# Patient Record
Sex: MALE | Race: OTHER | HISPANIC OR LATINO
[De-identification: names, ages, dates, MRNs, and addresses within clinical notes are randomized per-mention and may not be internally consistent; named-entity substitution may affect disease eponyms.]

---

## 2018-02-27 ENCOUNTER — APPOINTMENT (OUTPATIENT)
Dept: GASTROENTEROLOGY | Facility: CLINIC | Age: 58
End: 2018-02-27
Payer: COMMERCIAL

## 2018-02-27 VITALS
HEART RATE: 78 BPM | WEIGHT: 266 LBS | RESPIRATION RATE: 17 BRPM | OXYGEN SATURATION: 98 % | DIASTOLIC BLOOD PRESSURE: 70 MMHG | BODY MASS INDEX: 38.08 KG/M2 | SYSTOLIC BLOOD PRESSURE: 118 MMHG | HEIGHT: 70 IN | TEMPERATURE: 97.8 F

## 2018-02-27 DIAGNOSIS — Z78.9 OTHER SPECIFIED HEALTH STATUS: ICD-10-CM

## 2018-02-27 DIAGNOSIS — Z12.11 ENCOUNTER FOR SCREENING FOR MALIGNANT NEOPLASM OF COLON: ICD-10-CM

## 2018-02-27 DIAGNOSIS — M19.90 UNSPECIFIED OSTEOARTHRITIS, UNSPECIFIED SITE: ICD-10-CM

## 2018-02-27 PROCEDURE — 99204 OFFICE O/P NEW MOD 45 MIN: CPT

## 2018-02-28 PROBLEM — M19.90 OSTEOARTHRITIS, CHRONIC: Status: RESOLVED | Noted: 2018-02-28 | Resolved: 2018-02-28

## 2018-02-28 PROBLEM — Z12.11 COLON CANCER SCREENING: Status: RESOLVED | Noted: 2018-02-27 | Resolved: 2018-02-28

## 2018-04-02 DIAGNOSIS — Z87.01 PERSONAL HISTORY OF PNEUMONIA (RECURRENT): ICD-10-CM

## 2018-04-02 DIAGNOSIS — Z87.898 PERSONAL HISTORY OF OTHER SPECIFIED CONDITIONS: ICD-10-CM

## 2018-04-02 DIAGNOSIS — Z82.3 FAMILY HISTORY OF STROKE: ICD-10-CM

## 2018-04-02 DIAGNOSIS — Z87.442 PERSONAL HISTORY OF URINARY CALCULI: ICD-10-CM

## 2018-04-09 ENCOUNTER — APPOINTMENT (OUTPATIENT)
Dept: GASTROENTEROLOGY | Facility: CLINIC | Age: 58
End: 2018-04-09
Payer: COMMERCIAL

## 2018-04-09 VITALS
TEMPERATURE: 98.1 F | RESPIRATION RATE: 16 BRPM | BODY MASS INDEX: 39.22 KG/M2 | SYSTOLIC BLOOD PRESSURE: 118 MMHG | HEART RATE: 88 BPM | OXYGEN SATURATION: 98 % | HEIGHT: 70 IN | WEIGHT: 274 LBS | DIASTOLIC BLOOD PRESSURE: 86 MMHG

## 2018-04-09 PROCEDURE — 99214 OFFICE O/P EST MOD 30 MIN: CPT

## 2018-04-09 RX ORDER — PANTOPRAZOLE 40 MG/1
TABLET, DELAYED RELEASE ORAL
Refills: 0 | Status: COMPLETED | COMMUNITY
End: 2018-02-28

## 2018-04-16 ENCOUNTER — RX RENEWAL (OUTPATIENT)
Age: 58
End: 2018-04-16

## 2018-04-16 RX ORDER — RABEPRAZOLE SODIUM 20 MG/1
20 TABLET, DELAYED RELEASE ORAL DAILY
Qty: 30 | Refills: 5 | Status: DISCONTINUED | COMMUNITY
Start: 2018-04-16 | End: 2018-04-16

## 2018-04-16 RX ORDER — OMEPRAZOLE 20 MG/1
20 CAPSULE, DELAYED RELEASE ORAL DAILY
Qty: 30 | Refills: 3 | Status: DISCONTINUED | COMMUNITY
Start: 2018-04-16 | End: 2018-04-16

## 2018-05-04 ENCOUNTER — INPATIENT (INPATIENT)
Facility: HOSPITAL | Age: 58
LOS: 3 days | Discharge: HOME | DRG: 392 | End: 2018-05-08
Admitting: INTERNAL MEDICINE
Payer: COMMERCIAL

## 2018-05-04 ENCOUNTER — RESULT REVIEW (OUTPATIENT)
Age: 58
End: 2018-05-04

## 2018-05-04 ENCOUNTER — APPOINTMENT (OUTPATIENT)
Dept: GASTROENTEROLOGY | Facility: HOSPITAL | Age: 58
End: 2018-05-04
Payer: COMMERCIAL

## 2018-05-04 VITALS
HEART RATE: 85 BPM | TEMPERATURE: 99 F | WEIGHT: 235.89 LBS | HEIGHT: 70 IN | DIASTOLIC BLOOD PRESSURE: 87 MMHG | SYSTOLIC BLOOD PRESSURE: 133 MMHG | RESPIRATION RATE: 18 BRPM | OXYGEN SATURATION: 95 %

## 2018-05-04 DIAGNOSIS — K21.0 GASTRO-ESOPHAGEAL REFLUX DISEASE WITH ESOPHAGITIS: ICD-10-CM

## 2018-05-04 DIAGNOSIS — Z29.9 ENCOUNTER FOR PROPHYLACTIC MEASURES, UNSPECIFIED: ICD-10-CM

## 2018-05-04 DIAGNOSIS — Z98.890 OTHER SPECIFIED POSTPROCEDURAL STATES: Chronic | ICD-10-CM

## 2018-05-04 DIAGNOSIS — M54.9 DORSALGIA, UNSPECIFIED: ICD-10-CM

## 2018-05-04 DIAGNOSIS — E66.9 OBESITY, UNSPECIFIED: ICD-10-CM

## 2018-05-04 DIAGNOSIS — M19.90 UNSPECIFIED OSTEOARTHRITIS, UNSPECIFIED SITE: ICD-10-CM

## 2018-05-04 DIAGNOSIS — G47.33 OBSTRUCTIVE SLEEP APNEA (ADULT) (PEDIATRIC): ICD-10-CM

## 2018-05-04 DIAGNOSIS — K21.9 GASTRO-ESOPHAGEAL REFLUX DISEASE WITHOUT ESOPHAGITIS: ICD-10-CM

## 2018-05-04 LAB
ALBUMIN SERPL ELPH-MCNC: 4.4 G/DL — SIGNIFICANT CHANGE UP (ref 3.3–5)
ALP SERPL-CCNC: 46 U/L — SIGNIFICANT CHANGE UP (ref 40–120)
ALT FLD-CCNC: 29 U/L — SIGNIFICANT CHANGE UP (ref 10–45)
ANION GAP SERPL CALC-SCNC: 14 MMOL/L — SIGNIFICANT CHANGE UP (ref 5–17)
APTT BLD: 28.7 SEC — SIGNIFICANT CHANGE UP (ref 27.5–37.4)
AST SERPL-CCNC: 19 U/L — SIGNIFICANT CHANGE UP (ref 10–40)
BILIRUB SERPL-MCNC: 1.1 MG/DL — SIGNIFICANT CHANGE UP (ref 0.2–1.2)
BLD GP AB SCN SERPL QL: NEGATIVE — SIGNIFICANT CHANGE UP
BUN SERPL-MCNC: 16 MG/DL — SIGNIFICANT CHANGE UP (ref 7–23)
CALCIUM SERPL-MCNC: 9.3 MG/DL — SIGNIFICANT CHANGE UP (ref 8.4–10.5)
CHLORIDE SERPL-SCNC: 104 MMOL/L — SIGNIFICANT CHANGE UP (ref 96–108)
CO2 SERPL-SCNC: 21 MMOL/L — LOW (ref 22–31)
CREAT SERPL-MCNC: 1.06 MG/DL — SIGNIFICANT CHANGE UP (ref 0.5–1.3)
GLUCOSE SERPL-MCNC: 95 MG/DL — SIGNIFICANT CHANGE UP (ref 70–99)
HCT VFR BLD CALC: 46.4 % — SIGNIFICANT CHANGE UP (ref 39–50)
HGB BLD-MCNC: 16.1 G/DL — SIGNIFICANT CHANGE UP (ref 13–17)
INR BLD: 1.18 RATIO — HIGH (ref 0.88–1.16)
MAGNESIUM SERPL-MCNC: 2.1 MG/DL — SIGNIFICANT CHANGE UP (ref 1.6–2.6)
MCHC RBC-ENTMCNC: 32.6 PG — SIGNIFICANT CHANGE UP (ref 27–34)
MCHC RBC-ENTMCNC: 34.7 GM/DL — SIGNIFICANT CHANGE UP (ref 32–36)
MCV RBC AUTO: 93.8 FL — SIGNIFICANT CHANGE UP (ref 80–100)
PHOSPHATE SERPL-MCNC: 2.8 MG/DL — SIGNIFICANT CHANGE UP (ref 2.5–4.5)
PLATELET # BLD AUTO: 208 K/UL — SIGNIFICANT CHANGE UP (ref 150–400)
POTASSIUM SERPL-MCNC: 3.8 MMOL/L — SIGNIFICANT CHANGE UP (ref 3.5–5.3)
POTASSIUM SERPL-SCNC: 3.8 MMOL/L — SIGNIFICANT CHANGE UP (ref 3.5–5.3)
PROT SERPL-MCNC: 7.5 G/DL — SIGNIFICANT CHANGE UP (ref 6–8.3)
PROTHROM AB SERPL-ACNC: 12.9 SEC — HIGH (ref 9.8–12.7)
RBC # BLD: 4.95 M/UL — SIGNIFICANT CHANGE UP (ref 4.2–5.8)
RBC # FLD: 12 % — SIGNIFICANT CHANGE UP (ref 10.3–14.5)
RH IG SCN BLD-IMP: POSITIVE — SIGNIFICANT CHANGE UP
SODIUM SERPL-SCNC: 139 MMOL/L — SIGNIFICANT CHANGE UP (ref 135–145)
WBC # BLD: 9 K/UL — SIGNIFICANT CHANGE UP (ref 3.8–10.5)
WBC # FLD AUTO: 9 K/UL — SIGNIFICANT CHANGE UP (ref 3.8–10.5)

## 2018-05-04 PROCEDURE — 88312 SPECIAL STAINS GROUP 1: CPT | Mod: 26

## 2018-05-04 PROCEDURE — 99222 1ST HOSP IP/OBS MODERATE 55: CPT

## 2018-05-04 PROCEDURE — 88305 TISSUE EXAM BY PATHOLOGIST: CPT | Mod: 26

## 2018-05-04 RX ORDER — OXYCODONE HYDROCHLORIDE 5 MG/1
5 TABLET ORAL EVERY 4 HOURS
Qty: 0 | Refills: 0 | Status: DISCONTINUED | OUTPATIENT
Start: 2018-05-04 | End: 2018-05-04

## 2018-05-04 RX ORDER — OXYCODONE AND ACETAMINOPHEN 5; 325 MG/1; MG/1
1 TABLET ORAL EVERY 4 HOURS
Qty: 0 | Refills: 0 | Status: DISCONTINUED | OUTPATIENT
Start: 2018-05-04 | End: 2018-05-06

## 2018-05-04 RX ORDER — MORPHINE SULFATE 50 MG/1
2 CAPSULE, EXTENDED RELEASE ORAL ONCE
Qty: 0 | Refills: 0 | Status: DISCONTINUED | OUTPATIENT
Start: 2018-05-04 | End: 2018-05-04

## 2018-05-04 RX ORDER — HEPARIN SODIUM 5000 [USP'U]/ML
5000 INJECTION INTRAVENOUS; SUBCUTANEOUS EVERY 8 HOURS
Qty: 0 | Refills: 0 | Status: DISCONTINUED | OUTPATIENT
Start: 2018-05-04 | End: 2018-05-08

## 2018-05-04 RX ORDER — CYCLOBENZAPRINE HYDROCHLORIDE 10 MG/1
5 TABLET, FILM COATED ORAL THREE TIMES A DAY
Qty: 0 | Refills: 0 | Status: DISCONTINUED | OUTPATIENT
Start: 2018-05-04 | End: 2018-05-07

## 2018-05-04 RX ORDER — BENZOCAINE AND MENTHOL 5; 1 G/100ML; G/100ML
1 LIQUID ORAL
Qty: 0 | Refills: 0 | Status: DISCONTINUED | OUTPATIENT
Start: 2018-05-04 | End: 2018-05-08

## 2018-05-04 RX ORDER — ACETAMINOPHEN 500 MG
650 TABLET ORAL EVERY 6 HOURS
Qty: 0 | Refills: 0 | Status: DISCONTINUED | OUTPATIENT
Start: 2018-05-04 | End: 2018-05-08

## 2018-05-04 RX ORDER — LIDOCAINE 4 G/100G
2 CREAM TOPICAL DAILY
Qty: 0 | Refills: 0 | Status: DISCONTINUED | OUTPATIENT
Start: 2018-05-04 | End: 2018-05-08

## 2018-05-04 RX ADMIN — CYCLOBENZAPRINE HYDROCHLORIDE 5 MILLIGRAM(S): 10 TABLET, FILM COATED ORAL at 19:48

## 2018-05-04 RX ADMIN — LIDOCAINE 2 PATCH: 4 CREAM TOPICAL at 21:59

## 2018-05-04 RX ADMIN — OXYCODONE AND ACETAMINOPHEN 1 TABLET(S): 5; 325 TABLET ORAL at 23:03

## 2018-05-04 RX ADMIN — HEPARIN SODIUM 5000 UNIT(S): 5000 INJECTION INTRAVENOUS; SUBCUTANEOUS at 21:21

## 2018-05-04 RX ADMIN — MORPHINE SULFATE 2 MILLIGRAM(S): 50 CAPSULE, EXTENDED RELEASE ORAL at 18:26

## 2018-05-04 RX ADMIN — MORPHINE SULFATE 2 MILLIGRAM(S): 50 CAPSULE, EXTENDED RELEASE ORAL at 17:40

## 2018-05-04 RX ADMIN — OXYCODONE HYDROCHLORIDE 5 MILLIGRAM(S): 5 TABLET ORAL at 20:02

## 2018-05-04 RX ADMIN — OXYCODONE HYDROCHLORIDE 5 MILLIGRAM(S): 5 TABLET ORAL at 19:28

## 2018-05-04 RX ADMIN — OXYCODONE AND ACETAMINOPHEN 1 TABLET(S): 5; 325 TABLET ORAL at 23:32

## 2018-05-04 RX ADMIN — OXYCODONE HYDROCHLORIDE 5 MILLIGRAM(S): 5 TABLET ORAL at 21:02

## 2018-05-04 RX ADMIN — OXYCODONE HYDROCHLORIDE 5 MILLIGRAM(S): 5 TABLET ORAL at 18:24

## 2018-05-04 NOTE — H&P ADULT - HISTORY OF PRESENT ILLNESS
This is a 58 y/o male with h/o OA, MARÍA on CPAP, GERD, h/o H pylori infection s/p Quad therapy who presented for Bravo device placement with GI by EGD given refractory GERD symptoms despite therapy. Pt now s/p Bravo placement complaining of back pain. Pain is left sided located in paraspinal region worsen on movement. Pain started yesterday, pt believes he may have done something at work to cause current symptoms. Has had pain like this in the past.  Has a history of lumbar laminectomy. Denies any other complains. No chest pain, SOB, palpitations. No sore throat. no fever/chills.

## 2018-05-04 NOTE — H&P ADULT - ASSESSMENT
This is a 58 y/o male with h/o OA, MARÍA on CPAP, GERD, h/o H pylori infection s/p Quad therapy who presented for Bravo device placement with GI by EGD admitted for post procedure monitoring with back pain likely musculoskeletal in nature

## 2018-05-04 NOTE — PROVIDER CONTACT NOTE (OTHER) - SITUATION
pt just admitted from endoscopy, pt c/o severe back pain, was given oxycodone IR 5mg with no relief.

## 2018-05-04 NOTE — H&P ADULT - NSHPPHYSICALEXAM_GEN_ALL_CORE
.  VITAL SIGNS:  HR: 75  BP: 118/72  RR: 12  SpO2: 100  Wt(kg): --    PHYSICAL EXAM:    Constitutional: WDWN resting comfortably in bed; NAD  Head: NC/AT  Eyes: PERRL, EOMI, anicteric sclera  ENT: no nasal discharge; uvula midline, no oropharyngeal erythema or exudates; MMM  Neck: supple; no JVD   Respiratory: CTA B/L; no W/R/R  Cardiac: +S1/S2; RRR; no M/R/G  Gastrointestinal: soft, NT/ND; no rebound or guarding; +BSx4  Back: spine midline, paraspinal tenderness in L4 region on the left  Extremities: WWP, no clubbing or cyanosis; no peripheral edema  Musculoskeletal: NROM x4; no joint swelling, tenderness or erythema  Dermatologic: skin warm, dry and intact; no rashes, wounds, or scars  Neurologic: AAOx3; CNII-XII grossly intact; no focal deficits  Psychiatric: affect and characteristics of appearance, verbalizations, behaviors are appropriate

## 2018-05-04 NOTE — H&P ADULT - PMH
GERD (gastroesophageal reflux disease)    OA (osteoarthritis)    MARÍA on CPAP    Restless leg syndrome

## 2018-05-04 NOTE — H&P ADULT - PROBLEM SELECTOR PLAN 1
H/o H pylori infection in 2016 s/p Quad therapy, on acid suppression therapy with symptoms presented for Bravo placement. S/p placement. EGD with erosive esophagitis. Off Zantac currently given Bravo placement. Monitor for post procedure complications  - No MRI  - No Acid suppressing medications.  - GI follow up   - Resume diet

## 2018-05-04 NOTE — H&P ADULT - PROBLEM SELECTOR PLAN 2
Lumbar paraspinal muscle tenderness on examination. Pain started day prior to arrival. Likely musculoskeletal in nature, possibly muscle strain as pt works as .   - Will start Flexeril 5mb PRN and assess response.   - Tylenol for moderate pain.   - Apply cold compresses to area

## 2018-05-05 ENCOUNTER — TRANSCRIPTION ENCOUNTER (OUTPATIENT)
Age: 58
End: 2018-05-05

## 2018-05-05 DIAGNOSIS — R63.8 OTHER SYMPTOMS AND SIGNS CONCERNING FOOD AND FLUID INTAKE: ICD-10-CM

## 2018-05-05 LAB
ANION GAP SERPL CALC-SCNC: 13 MMOL/L — SIGNIFICANT CHANGE UP (ref 5–17)
BUN SERPL-MCNC: 15 MG/DL — SIGNIFICANT CHANGE UP (ref 7–23)
CALCIUM SERPL-MCNC: 9.4 MG/DL — SIGNIFICANT CHANGE UP (ref 8.4–10.5)
CHLORIDE SERPL-SCNC: 100 MMOL/L — SIGNIFICANT CHANGE UP (ref 96–108)
CO2 SERPL-SCNC: 24 MMOL/L — SIGNIFICANT CHANGE UP (ref 22–31)
CREAT SERPL-MCNC: 0.93 MG/DL — SIGNIFICANT CHANGE UP (ref 0.5–1.3)
GLUCOSE SERPL-MCNC: 92 MG/DL — SIGNIFICANT CHANGE UP (ref 70–99)
HCT VFR BLD CALC: 45.3 % — SIGNIFICANT CHANGE UP (ref 39–50)
HGB BLD-MCNC: 15.9 G/DL — SIGNIFICANT CHANGE UP (ref 13–17)
MAGNESIUM SERPL-MCNC: 2 MG/DL — SIGNIFICANT CHANGE UP (ref 1.6–2.6)
MCHC RBC-ENTMCNC: 32.8 PG — SIGNIFICANT CHANGE UP (ref 27–34)
MCHC RBC-ENTMCNC: 35.1 GM/DL — SIGNIFICANT CHANGE UP (ref 32–36)
MCV RBC AUTO: 93.5 FL — SIGNIFICANT CHANGE UP (ref 80–100)
PLATELET # BLD AUTO: 199 K/UL — SIGNIFICANT CHANGE UP (ref 150–400)
POTASSIUM SERPL-MCNC: 4.1 MMOL/L — SIGNIFICANT CHANGE UP (ref 3.5–5.3)
POTASSIUM SERPL-SCNC: 4.1 MMOL/L — SIGNIFICANT CHANGE UP (ref 3.5–5.3)
RBC # BLD: 4.84 M/UL — SIGNIFICANT CHANGE UP (ref 4.2–5.8)
RBC # FLD: 11.7 % — SIGNIFICANT CHANGE UP (ref 10.3–14.5)
SODIUM SERPL-SCNC: 137 MMOL/L — SIGNIFICANT CHANGE UP (ref 135–145)
WBC # BLD: 8.7 K/UL — SIGNIFICANT CHANGE UP (ref 3.8–10.5)
WBC # FLD AUTO: 8.7 K/UL — SIGNIFICANT CHANGE UP (ref 3.8–10.5)

## 2018-05-05 PROCEDURE — 72110 X-RAY EXAM L-2 SPINE 4/>VWS: CPT | Mod: 26,76

## 2018-05-05 PROCEDURE — 99233 SBSQ HOSP IP/OBS HIGH 50: CPT | Mod: GC

## 2018-05-05 RX ORDER — HYDROMORPHONE HYDROCHLORIDE 2 MG/ML
0.5 INJECTION INTRAMUSCULAR; INTRAVENOUS; SUBCUTANEOUS ONCE
Qty: 0 | Refills: 0 | Status: DISCONTINUED | OUTPATIENT
Start: 2018-05-05 | End: 2018-05-05

## 2018-05-05 RX ORDER — DOCUSATE SODIUM 100 MG
100 CAPSULE ORAL THREE TIMES A DAY
Qty: 0 | Refills: 0 | Status: DISCONTINUED | OUTPATIENT
Start: 2018-05-05 | End: 2018-05-08

## 2018-05-05 RX ORDER — SENNA PLUS 8.6 MG/1
2 TABLET ORAL AT BEDTIME
Qty: 0 | Refills: 0 | Status: DISCONTINUED | OUTPATIENT
Start: 2018-05-05 | End: 2018-05-08

## 2018-05-05 RX ORDER — NALOXONE HYDROCHLORIDE 4 MG/.1ML
0.4 SPRAY NASAL ONCE
Qty: 0 | Refills: 0 | Status: DISCONTINUED | OUTPATIENT
Start: 2018-05-05 | End: 2018-05-08

## 2018-05-05 RX ORDER — NALOXONE HYDROCHLORIDE 4 MG/.1ML
0.4 SPRAY NASAL ONCE
Qty: 0 | Refills: 0 | Status: DISCONTINUED | OUTPATIENT
Start: 2018-05-05 | End: 2018-05-05

## 2018-05-05 RX ORDER — MORPHINE SULFATE 50 MG/1
4 CAPSULE, EXTENDED RELEASE ORAL ONCE
Qty: 0 | Refills: 0 | Status: DISCONTINUED | OUTPATIENT
Start: 2018-05-05 | End: 2018-05-05

## 2018-05-05 RX ORDER — CYCLOBENZAPRINE HYDROCHLORIDE 10 MG/1
1 TABLET, FILM COATED ORAL
Qty: 0 | Refills: 0 | COMMUNITY
Start: 2018-05-05

## 2018-05-05 RX ORDER — KETOROLAC TROMETHAMINE 30 MG/ML
60 SYRINGE (ML) INJECTION ONCE
Qty: 0 | Refills: 0 | Status: COMPLETED | OUTPATIENT
Start: 2018-05-05 | End: 2018-05-05

## 2018-05-05 RX ADMIN — HEPARIN SODIUM 5000 UNIT(S): 5000 INJECTION INTRAVENOUS; SUBCUTANEOUS at 06:27

## 2018-05-05 RX ADMIN — LIDOCAINE 2 PATCH: 4 CREAM TOPICAL at 09:00

## 2018-05-05 RX ADMIN — OXYCODONE AND ACETAMINOPHEN 1 TABLET(S): 5; 325 TABLET ORAL at 03:48

## 2018-05-05 RX ADMIN — OXYCODONE AND ACETAMINOPHEN 1 TABLET(S): 5; 325 TABLET ORAL at 19:00

## 2018-05-05 RX ADMIN — OXYCODONE AND ACETAMINOPHEN 1 TABLET(S): 5; 325 TABLET ORAL at 15:00

## 2018-05-05 RX ADMIN — CYCLOBENZAPRINE HYDROCHLORIDE 5 MILLIGRAM(S): 10 TABLET, FILM COATED ORAL at 06:26

## 2018-05-05 RX ADMIN — OXYCODONE AND ACETAMINOPHEN 1 TABLET(S): 5; 325 TABLET ORAL at 18:07

## 2018-05-05 RX ADMIN — HYDROMORPHONE HYDROCHLORIDE 0.5 MILLIGRAM(S): 2 INJECTION INTRAMUSCULAR; INTRAVENOUS; SUBCUTANEOUS at 09:20

## 2018-05-05 RX ADMIN — MORPHINE SULFATE 4 MILLIGRAM(S): 50 CAPSULE, EXTENDED RELEASE ORAL at 01:31

## 2018-05-05 RX ADMIN — OXYCODONE AND ACETAMINOPHEN 1 TABLET(S): 5; 325 TABLET ORAL at 07:54

## 2018-05-05 RX ADMIN — CYCLOBENZAPRINE HYDROCHLORIDE 5 MILLIGRAM(S): 10 TABLET, FILM COATED ORAL at 13:59

## 2018-05-05 RX ADMIN — OXYCODONE AND ACETAMINOPHEN 1 TABLET(S): 5; 325 TABLET ORAL at 14:04

## 2018-05-05 RX ADMIN — LIDOCAINE 2 PATCH: 4 CREAM TOPICAL at 14:03

## 2018-05-05 RX ADMIN — OXYCODONE AND ACETAMINOPHEN 1 TABLET(S): 5; 325 TABLET ORAL at 08:50

## 2018-05-05 RX ADMIN — OXYCODONE AND ACETAMINOPHEN 1 TABLET(S): 5; 325 TABLET ORAL at 04:20

## 2018-05-05 RX ADMIN — OXYCODONE AND ACETAMINOPHEN 1 TABLET(S): 5; 325 TABLET ORAL at 22:35

## 2018-05-05 RX ADMIN — MORPHINE SULFATE 4 MILLIGRAM(S): 50 CAPSULE, EXTENDED RELEASE ORAL at 00:51

## 2018-05-05 RX ADMIN — HEPARIN SODIUM 5000 UNIT(S): 5000 INJECTION INTRAVENOUS; SUBCUTANEOUS at 21:50

## 2018-05-05 RX ADMIN — HEPARIN SODIUM 5000 UNIT(S): 5000 INJECTION INTRAVENOUS; SUBCUTANEOUS at 13:59

## 2018-05-05 RX ADMIN — SENNA PLUS 2 TABLET(S): 8.6 TABLET ORAL at 21:48

## 2018-05-05 RX ADMIN — HYDROMORPHONE HYDROCHLORIDE 0.5 MILLIGRAM(S): 2 INJECTION INTRAMUSCULAR; INTRAVENOUS; SUBCUTANEOUS at 09:04

## 2018-05-05 RX ADMIN — OXYCODONE AND ACETAMINOPHEN 1 TABLET(S): 5; 325 TABLET ORAL at 21:56

## 2018-05-05 RX ADMIN — Medication 100 MILLIGRAM(S): at 21:50

## 2018-05-05 NOTE — DISCHARGE NOTE ADULT - HOSPITAL COURSE
HPI:  This is a 58 y/o male with h/o OA, MARÍA on CPAP, GERD, h/o H pylori infection s/p Quad therapy who presented for Bravo device placement with GI by EGD given refractory GERD symptoms despite therapy. Pt now s/p Bravo placement complaining of back pain. Pain is left sided located in paraspinal region worsen on movement. Pain started yesterday, pt believes he may have done something at work to cause current symptoms. Has had pain like this in the past.  Has a history of lumbar laminectomy. Denies any other complains. No chest pain, SOB, palpitations. No sore throat. no fever/chills.    Hospital Course:  Pt admitted for BRAVO procedure for GERD; done without complication.  Pt also complained of L paraspinal pain in L5 region, worst it's ever been since disc surgery approximately 12 years ago.  Pain did not respond to Tylenol, lidocaine patches, Percocet, IVP Morphine or Dilaudid.  Lumbosacral X-ray obtained, which showed ________________.  Orthopedic surgery called, which recommended ______________.  Pt stable for discharge home with outpatient follow up. HPI:  This is a 56 y/o male with h/o OA, MARÍA on CPAP, GERD, h/o H pylori infection s/p Quad therapy who presented for Bravo device placement with GI by EGD given refractory GERD symptoms despite therapy. Pt now s/p Bravo placement complaining of back pain. Pain is left sided located in paraspinal region worsen on movement. Pain started yesterday, pt believes he may have done something at work to cause current symptoms. Has had pain like this in the past.  Has a history of lumbar laminectomy. Denies any other complains. No chest pain, SOB, palpitations. No sore throat. no fever/chills.    Hospital Course:  Pt admitted for BRAVO procedure for GERD; done without complication.  Pt also complained of L paraspinal pain in L5 region, worst it's ever been since disc surgery approximately 12 years ago.  Pain did not respond to Tylenol, lidocaine patches, Percocet, IVP Morphine or Dilaudid.  Lumbosacral X-ray obtained, which showed no concerning findings, which suggests that this is likely MSK-related pain based on the history and presentation.  Orthopedic surgery called, which recommended diazepam for one day as a muscle relaxant with outpatient follow up.  Pt stable for discharge home with outpatient follow up. HPI:  This is a 58 y/o male with h/o OA, MARÍA on CPAP, GERD, h/o H pylori infection s/p Quad therapy who presented for Bravo device placement with GI by EGD given refractory GERD symptoms despite therapy. Pt now s/p Bravo placement complaining of back pain. Pain is left sided located in paraspinal region worsen on movement. Pain started yesterday, pt believes he may have done something at work to cause current symptoms. Has had pain like this in the past.  Has a history of lumbar laminectomy. Denies any other complains. No chest pain, SOB, palpitations. No sore throat. no fever/chills.    Hospital Course:  Pt admitted for BRAVO procedure for GERD; done without complication.  Pt also complained of L paraspinal pain in L5 region, worst it's ever been since disc surgery approximately 12 years ago.  Pain did not respond to Tylenol, lidocaine patches, Percocet, IVP Morphine or Dilaudid.  Lumbosacral X-ray obtained, which showed no concerning findings, which suggests that this is likely MSK-related pain based on the history and presentation.  Orthopedic surgery called, which recommended diazepam for one day as a muscle relaxant with outpatient follow up; pt and wife understood not to take Valium before driving or to to mix Valium with alcohol.  Pt stable for discharge home with outpatient follow up.

## 2018-05-05 NOTE — DISCHARGE NOTE ADULT - CARE PLAN
Principal Discharge DX:	Back pain  Secondary Diagnosis:	GERD (gastroesophageal reflux disease)  Secondary Diagnosis:	Obesity  Secondary Diagnosis:	MARÍA on CPAP Principal Discharge DX:	Back pain  Goal:	treat  Assessment and plan of treatment:	You had severe back pain in the left lumbosacral region that did not respond to lidocaine patches, dilaudid, morphine, tylenol, but did gradually improve with pregabalin and toradol, but we held the toradol because you just had the BRAVO procedure.  Orthopedics was consulted and started you on diazepam, which is a stronger muscle relaxant than flexeril.  Because of your BRAVO, you were unable to undergo MRI.  Please follow up with orthopedics as an outpatient and return to the emergency room immediately if you have leg numbness, loss of strength, or become incontinent of your urine and stool.  Secondary Diagnosis:	GERD (gastroesophageal reflux disease)  Goal:	treat  Assessment and plan of treatment:	You were admitted for monitoring after having a BRAVO procedure with gastroenterology.  You had no complications.  Because of the BRAVO, you were unable to get an MRI for your back pain, which could be done as an outpatient with orthopedics once the device is retried by GI.  Please follow up with gastroenterology as an outpatient for further work up and take your medications as prescribed.  Secondary Diagnosis:	Obesity  Goal:	Treat  Assessment and plan of treatment:	Please see your primary care physician within one to two weeks of discharge for a nutrition referral, to help you create a weight loss program.  This will ultimately improve your back pain and sleep apnea.  Secondary Diagnosis:	MARÍA on CPAP  Goal:	Continue  Assessment and plan of treatment:	Please continue to use your CPAP machine for your obstructive sleep apnea as prescribed. Principal Discharge DX:	Back pain  Goal:	treat  Assessment and plan of treatment:	You had severe back pain in the left lumbosacral region that did not respond to lidocaine patches, dilaudid, morphine, tylenol, but did gradually improve with pregabalin and toradol, but we held the toradol because you just had the BRAVO procedure.  Orthopedics was consulted and started you on diazepam, which is a stronger muscle relaxant than flexeril.  Because of your BRAVO, you were unable to undergo MRI.  Please follow up with orthopedics as an outpatient and return to the emergency room immediately if you have leg numbness, loss of strength, or become incontinent of your urine and stool.  Do not take Valium and then drive; do not take Valium with alcohol.  Secondary Diagnosis:	GERD (gastroesophageal reflux disease)  Goal:	treat  Assessment and plan of treatment:	You were admitted for monitoring after having a BRAVO procedure with gastroenterology.  You had no complications.  Because of the BRAVO, you were unable to get an MRI for your back pain, which could be done as an outpatient with orthopedics once the device is retried by GI.  Please follow up with gastroenterology as an outpatient for further work up and take your medications as prescribed.  Take Protonix 40 mg by mouth once a day.  Secondary Diagnosis:	Obesity  Goal:	Treat  Assessment and plan of treatment:	Please see your primary care physician within one to two weeks of discharge for a nutrition referral, to help you create a weight loss program.  This will ultimately improve your back pain and sleep apnea.  Secondary Diagnosis:	MARÍA on CPAP  Goal:	Continue  Assessment and plan of treatment:	Please continue to use your CPAP machine for your obstructive sleep apnea as prescribed.

## 2018-05-05 NOTE — PROGRESS NOTE ADULT - PROBLEM SELECTOR PLAN 4
H/o knee OA. No pain currently  - Pain control with Tylenol as needed H/o knee OA; well-controlled  - Tylenol PRN

## 2018-05-05 NOTE — DISCHARGE NOTE ADULT - MEDICATION SUMMARY - MEDICATIONS TO TAKE
I will START or STAY ON the medications listed below when I get home from the hospital:    outpatient physical therapy  -- Indication: For physical therapy    naproxen sodium 220 mg oral tablet  -- 2 tab(s) by mouth 2 times a day   -- Check with your doctor before becoming pregnant.  It is very important that you take or use this exactly as directed.  Do not skip doses or discontinue unless directed by your doctor.  May cause drowsiness or dizziness.  Obtain medical advice before taking any non-prescription drugs as some may affect the action of this medication.  Take with food or milk.    -- Indication: For Back pain    Valium 10 mg oral tablet  -- 1 tab(s) by mouth 3 times a day   MDD: do not exceed 3 tabs per day; take only as needed  -- Caution federal law prohibits the transfer of this drug to any person other  than the person for whom it was prescribed.  Do not take this drug if you are pregnant.  May cause drowsiness.  Alcohol may intensify this effect.  Use care when operating dangerous machinery.    -- Indication: For Back pain as needed    pregabalin 25 mg oral capsule  -- 1 cap(s) by mouth 2 times a day   MDD: do not exceed 2 caps per day  -- Check with your doctor before becoming pregnant.  Do not drink alcoholic beverages when taking this medication.  May cause drowsiness or dizziness.  This drug may impair the ability to drive or operate machinery.  Use care until you become familiar with its effects.    -- Indication: For Back pain    Lidoderm 5% topical film  -- Apply on skin to affected area once a day   -- Indication: For Back pain    Protonix 40 mg oral delayed release tablet  -- 1 tab(s) by mouth once a day   -- It is very important that you take or use this exactly as directed.  Do not skip doses or discontinue unless directed by your doctor.  Obtain medical advice before taking any non-prescription drugs as some may affect the action of this medication.  Swallow whole.  Do not crush.    -- Indication: For GERD (gastroesophageal reflux disease)

## 2018-05-05 NOTE — PROGRESS NOTE ADULT - SUBJECTIVE AND OBJECTIVE BOX
57y Male s/p EGD on 5/4/18    T(C): 36.9 (05-05-18 @ 09:03), Max: 37.2 (05-04-18 @ 21:54)  HR: 72 (05-05-18 @ 09:03) (72 - 85)  BP: 110/72 (05-05-18 @ 09:03) (101/64 - 133/87)  RR: 18 (05-05-18 @ 09:03) (18 - 19)  SpO2: 94% (05-05-18 @ 09:03) (94% - 96%)  Wt(kg): --    Pt seen, doing well, no anesthesia complications or complaints noted or reported.

## 2018-05-05 NOTE — PROGRESS NOTE ADULT - PROBLEM SELECTOR PLAN 5
Outpatient lifestyle modification. Dietary counseling provided Obese as per BMI  - nutrition consult  - continue dietary education

## 2018-05-05 NOTE — PROVIDER CONTACT NOTE (OTHER) - ACTION/TREATMENT ORDERED:
give pt PRN dose of flexeril 5mg now. Dr Ashton in room assessing pt.
ok to give pt next dose of oxycodone ir.  call provider if pain persists.
will review pt chart and place orders as appropriate. continue to monitor

## 2018-05-05 NOTE — PROGRESS NOTE ADULT - SUBJECTIVE AND OBJECTIVE BOX
Randy Gomez MD  Beeper: [phone] ([facility])/ [phone] (J)     Subjective:    Patient is a 57y old  Male who presents with a chief complaint of EGD with Bravo placement (04 May 2018 15:15)      Overnight events:    MEDICATIONS  (STANDING):  heparin  Injectable 5000 Unit(s) SubCutaneous every 8 hours  lidocaine   Patch 2 Patch Transdermal daily    MEDICATIONS  (PRN):  acetaminophen   Tablet. 650 milliGRAM(s) Oral every 6 hours PRN Mild Pain (1 - 3)  benzocaine 15 mG/menthol 3.6 mG Lozenge 1 Lozenge Oral every 2 hours PRN Sore Throat  cyclobenzaprine 5 milliGRAM(s) Oral three times a day PRN Muscle Spasm  oxyCODONE    5 mG/acetaminophen 325 mG 1 Tablet(s) Oral every 4 hours PRN Severe Pain (7 - 10)      Objective:    Vitals: Vital Signs Last 24 Hrs  T(C): 37.2 (05-04-18 @ 21:54), Max: 37.2 (05-04-18 @ 21:54)  T(F): 99 (05-04-18 @ 21:54), Max: 99 (05-04-18 @ 21:54)  HR: 82 (05-04-18 @ 21:54) (82 - 85)  BP: 101/64 (05-04-18 @ 21:54) (101/64 - 133/87)  BP(mean): --  RR: 18 (05-04-18 @ 21:54) (18 - 18)  SpO2: 94% (05-04-18 @ 21:54) (94% - 95%)              I&O's Summary    04 May 2018 07:01  -  05 May 2018 05:34  --------------------------------------------------------  IN: 360 mL / OUT: 700 mL / NET: -340 mL        PHYSICAL EXAM:  GENERAL: NAD, well-groomed, well-developed  HEAD:  Atraumatic, Normocephalic  EYES: EOMI, PERRLA, conjunctiva and sclera clear  ENMT: No tonsillar erythema, exudates, or enlargement; Moist mucous membranes, Good dentition, No lesions  NECK: Supple, No JVD, Normal thyroid  CHEST/LUNG: Clear to percussion bilaterally; No rales, rhonchi, wheezing, or rubs  HEART: Regular rate and rhythm; No murmurs, rubs, or gallops  ABDOMEN: Soft, Nontender, Nondistended; Bowel sounds present  EXTREMITIES:  2+ Peripheral Pulses, No clubbing, cyanosis, or edema  LYMPH: No lymphadenopathy noted  SKIN: No rashes or lesions  NERVOUS SYSTEM:  Alert & Oriented X3, Good concentration; Motor Strength 5/5 B/L upper and lower extremities; DTRs 2+ intact and symmetric                                             LABS:  05-04    139  |  104  |  16  ----------------------------<  95  3.8   |  21<L>  |  1.06    Ca    9.3      04 May 2018 20:54  Phos  2.8     05-04  Mg     2.1     05-04    TPro  7.5  /  Alb  4.4  /  TBili  1.1  /  DBili  x   /  AST  19  /  ALT  29  /  AlkPhos  46  05-04      PT/INR - ( 04 May 2018 20:54 )   PT: 12.9 sec;   INR: 1.18 ratio         PTT - ( 04 May 2018 20:54 )  PTT:28.7 sec                                              16.1   9.0   )-----------( 208      ( 04 May 2018 20:54 )             46.4     CAPILLARY BLOOD GLUCOSE            RECENT CULTURES:      TELEMETRY:    ECG:    TTE:    RADIOLOGY & ADDITIONAL TESTS:    Imaging Personally Reviewed:  [ ] YES  [ ] NO    Consultants involved in case:   Consultant(s) Notes Reviewed:  [ ] YES  [ ] NO:   Care Discussed with Consultants/Other Providers [ ] YES  [ ] NO Randy Gomez MD  Beeper: [phone] ([facility])/ [phone] ([facility])     Subjective:    Patient is a 57y old  Male who presents with a chief complaint of EGD with Bravo placement (04 May 2018 15:15)    Overnight events: pt is having significant back pain not responding to opiates    MEDICATIONS  (STANDING):  heparin  Injectable 5000 Unit(s) SubCutaneous every 8 hours  lidocaine   Patch 2 Patch Transdermal daily    MEDICATIONS  (PRN):  acetaminophen   Tablet. 650 milliGRAM(s) Oral every 6 hours PRN Mild Pain (1 - 3)  benzocaine 15 mG/menthol 3.6 mG Lozenge 1 Lozenge Oral every 2 hours PRN Sore Throat  cyclobenzaprine 5 milliGRAM(s) Oral three times a day PRN Muscle Spasm  oxyCODONE    5 mG/acetaminophen 325 mG 1 Tablet(s) Oral every 4 hours PRN Severe Pain (7 - 10)    Objective:    Vitals: Vital Signs Last 24 Hrs  T(C): 37.2 (05-04-18 @ 21:54), Max: 37.2 (05-04-18 @ 21:54)  T(F): 99 (05-04-18 @ 21:54), Max: 99 (05-04-18 @ 21:54)  HR: 82 (05-04-18 @ 21:54) (82 - 85)  BP: 101/64 (05-04-18 @ 21:54) (101/64 - 133/87)  RR: 18 (05-04-18 @ 21:54) (18 - 18)  SpO2: 94% (05-04-18 @ 21:54) (94% - 95%)              I&O's Summary    04 May 2018 07:01  -  05 May 2018 05:34  --------------------------------------------------------  IN: 360 mL / OUT: 700 mL / NET: -340 mL    PHYSICAL EXAM:  GENERAL: anxious, in pain  HEAD:  Atraumatic, Normocephalic  EYES: PERLLA, sclera clear  ENMT: mmm  CHEST/LUNG: Clear to percussion bilaterally; No rales, rhonchi, wheezing, or rubs  HEART: Regular rate and rhythm; No murmurs, rubs, or gallops  ABDOMEN: Soft, Nontender, Nondistended; Bowel sounds present  BACK: paraspinal TTP  EXTREMITIES:  no edema                                   LABS:  05-04    139  |  104  |  16  ----------------------------<  95  3.8   |  21<L>  |  1.06    Ca    9.3      04 May 2018 20:54  Phos  2.8     05-04  Mg     2.1     05-04    TPro  7.5  /  Alb  4.4  /  TBili  1.1  /  DBili  x   /  AST  19  /  ALT  29  /  AlkPhos  46  05-04      PT/INR - ( 04 May 2018 20:54 )   PT: 12.9 sec;   INR: 1.18 ratio         PTT - ( 04 May 2018 20:54 )  PTT:28.7 sec                                              16.1   9.0   )-----------( 208      ( 04 May 2018 20:54 )             46.4     CAPILLARY BLOOD GLUCOSE            RECENT CULTURES:      TELEMETRY:    ECG:    TTE:    RADIOLOGY & ADDITIONAL TESTS:    Imaging Personally Reviewed:  [ ] YES  [ ] NO    Consultants involved in case:   Consultant(s) Notes Reviewed:  [ ] YES  [ ] NO:   Care Discussed with Consultants/Other Providers [ ] YES  [ ] NO

## 2018-05-05 NOTE — PROGRESS NOTE ADULT - PROBLEM SELECTOR PLAN 1
H/o H pylori infection in 2016 s/p Quad therapy, on acid suppression therapy with symptoms presented for Bravo placement. S/p placement. EGD with erosive esophagitis. Off Zantac currently given Bravo placement. Monitor for post procedure complications  - No MRI  - No Acid suppressing medications.  - GI follow up   - Resume diet H/o H pylori infection in 2016 s/p Quad therapy, on acid suppression therapy with symptoms presented for Bravo placement; EGD with erosive esophagitis.   - Monitor for post procedure complications  - No MRI  - No Acid suppressing medications.  - GI follow up   - Resume diet

## 2018-05-05 NOTE — DISCHARGE NOTE ADULT - MEDICATION SUMMARY - MEDICATIONS TO STOP TAKING
I will STOP taking the medications listed below when I get home from the hospital:    Zantac 300 oral tablet  -- 1 tab(s) by mouth once a day (at bedtime)    meloxicam 15 mg oral tablet  -- 1 tab(s) by mouth once a day, As Needed

## 2018-05-05 NOTE — DISCHARGE NOTE ADULT - PLAN OF CARE
treat You had severe back pain in the left lumbosacral region that did not respond to lidocaine patches, dilaudid, morphine, tylenol, but did gradually improve with pregabalin and toradol, but we held the toradol because you just had the BRAVO procedure.  Orthopedics was consulted and started you on diazepam, which is a stronger muscle relaxant than flexeril.  Because of your BRAVO, you were unable to undergo MRI.  Please follow up with orthopedics as an outpatient and return to the emergency room immediately if you have leg numbness, loss of strength, or become incontinent of your urine and stool. You were admitted for monitoring after having a BRAVO procedure with gastroenterology.  You had no complications.  Because of the BRAVO, you were unable to get an MRI for your back pain, which could be done as an outpatient with orthopedics once the device is retried by GI.  Please follow up with gastroenterology as an outpatient for further work up and take your medications as prescribed. Treat Please see your primary care physician within one to two weeks of discharge for a nutrition referral, to help you create a weight loss program.  This will ultimately improve your back pain and sleep apnea. Continue Please continue to use your CPAP machine for your obstructive sleep apnea as prescribed. You had severe back pain in the left lumbosacral region that did not respond to lidocaine patches, dilaudid, morphine, tylenol, but did gradually improve with pregabalin and toradol, but we held the toradol because you just had the BRAVO procedure.  Orthopedics was consulted and started you on diazepam, which is a stronger muscle relaxant than flexeril.  Because of your BRAVO, you were unable to undergo MRI.  Please follow up with orthopedics as an outpatient and return to the emergency room immediately if you have leg numbness, loss of strength, or become incontinent of your urine and stool.  Do not take Valium and then drive; do not take Valium with alcohol. You were admitted for monitoring after having a BRAVO procedure with gastroenterology.  You had no complications.  Because of the BRAVO, you were unable to get an MRI for your back pain, which could be done as an outpatient with orthopedics once the device is retried by GI.  Please follow up with gastroenterology as an outpatient for further work up and take your medications as prescribed.  Take Protonix 40 mg by mouth once a day.

## 2018-05-05 NOTE — PROVIDER CONTACT NOTE (OTHER) - ASSESSMENT
pt laying in bed on his side facial grimacing in pain
pt woke up c/o severe back pain. pt clenching teeth.
pt laying in bed on his side

## 2018-05-05 NOTE — PROGRESS NOTE ADULT - PROBLEM SELECTOR PLAN 2
Lumbar paraspinal muscle tenderness on examination. Pain started day prior to arrival. Likely musculoskeletal in nature, possibly muscle strain as pt works as .   - Will start Flexeril 5mb PRN and assess response.   - Tylenol for moderate pain.   - Apply cold compresses to area Lumbar paraspinal muscle tenderness on examination; pain x2 days; not responding to flexeril or opiates  - c/w flexeril 5 mg TID  - Tylenol PRN; however, not effective  - lidocaine patch  - f/u lumbosacral X-ray; will probably need to get MRI

## 2018-05-05 NOTE — DISCHARGE NOTE ADULT - CARE PROVIDER_API CALL
Emma Stein), Orthopaedic Surgery Orthopaedics Surgery  [address] [place] [address]  Phone: [phone]  Fax: [phone]

## 2018-05-05 NOTE — PROVIDER CONTACT NOTE (OTHER) - BACKGROUND
dx: gastroesophageal reflux disease without esophagitis

## 2018-05-05 NOTE — DISCHARGE NOTE ADULT - ADDITIONAL INSTRUCTIONS
Please follow up with orthopedic surgery as an outpatient.  Please take your medication as prescribed. Please follow up with orthopedic surgery as an outpatient - [phone] (Dr. Stein) at [address] [place] [address] Road - call to make an appointment  Please take your medication as prescribed.  Do not mix valium with alcohol; do not take valium and then drive. Please follow up with orthopedic surgery as an outpatient - [phone] (Dr. Stein) at [address] [place] [address] Road - call to make an appointment  Please take your medication as prescribed..  Do not mix valium with alcohol; do not take valium and then drive.

## 2018-05-05 NOTE — CHART NOTE - NSCHARTNOTEFT_GEN_A_CORE
Was called to bedside by nurse for pain management. Patient with chronic back pain here for EGD for bravo placement. Patient with non radiating pain, located on the L lower lateral side extending up toward mid back. 8/10 pain.   -I reviewed H&P and acknowledge plan for pain management with tylenol  -I acknowledge patient has received oxycodone 5mg IR and Morphine 2mg IV for break through  -Placed warm compresses and lidocaine patch with no improvement in pain    Physical exam  General: NAD, laying on his side  Lungs: CTAB  CV: RR no murmur  MSK: No tenderness on spinal processes, tenderness to light palpation of L lateral side, erythematous and warm appreciated  Ext: No edema  Neuro: No deficits noted, sensation intact strength LE 5/5 b/l, pain in his lower back with movement of LE b/l    Plan:  -Patient with chronic back pain and history of percocet use for it, will dc oxycodone IR 5mg and place on percocet for the night  -Morphine 4mg IV for breakthrough as 2mg did not help patient.   -Will recommend to the day team to calculate total opioid needed and consider placing on oxycodone ER with their choice of opioid for break through. Would consider pain management consult.  -Tylenol for mild pain  -Wife concerned for need for evaluation by orthopedics. At this time, I do not believe orthopedics needs to be involved as there is no emergent findings. No neuro deficits noted on exam. Patient was instructed to alert me of any changes in neuro exam.    Pranay Ashton M.D. PGY1  [phone] Was called to bedside by nurse for lower back pain. Patient with chronic lower back pain here for EGD for bravo placement. Patient with non radiating pain, located on the L lower lateral side extending up toward mid back. 10/10 pain.   -I reviewed H&P and acknowledge plan for pain management with tylenol, however order written for mild pain and this LBP is 10/10  -I acknowledge patient has received oxycodone 5mg IR and Morphine 2mg IV for break through since coming to the floor  -Originally ordered oxycodone 5mg to be given earlier than anticipated with warm compresses and lidocaine patch, but no improvement in pain    Physical exam  General: NAD, laying on his side  Lungs: CTAB  CV: RR no murmur  MSK: No tenderness on spinal processes, tenderness to light palpation of L lateral side, erythematous and warm appreciated  Ext: No edema  Neuro: No deficits noted, sensation intact strength LE 5/5 b/l, pain in his lower back with movement of LE b/l    Plan:  -Patient with chronic back pain and history of percocet use for it, will dc oxycodone IR 5mg and place on percocet for the night  -Morphine 4mg IV once for breakthrough as 2mg did not help patient.   -Will recommend to the day team to calculate total opioid needed and consider placing on oxycodone ER with their choice of opioid for break through. Would consider pain management consult.  -Tylenol for mild pain  -Wife concerned for need for evaluation by orthopedics. At this time, I do not believe orthopedics needs to be involved as there is no emergent findings. No neuro deficits noted on exam. Patient was instructed to alert me of any changes in neuro exam.  -Will continue to monitor BP as patient was  before administration of Morphine 4mg IV. Will hold next dose of percocet if SBP<100    Pranay Ashton M.D. PGY1  [phone]

## 2018-05-05 NOTE — DISCHARGE NOTE ADULT - NS TRANSFER PATIENT BELONGINGS
Electronic Device (specify)/Clothing/Cell Phone/PDA (specify)/CPAP, wedding band, cell phone/Jewelry/Money (specify)

## 2018-05-06 DIAGNOSIS — E87.1 HYPO-OSMOLALITY AND HYPONATREMIA: ICD-10-CM

## 2018-05-06 LAB
ALBUMIN SERPL ELPH-MCNC: 4.1 G/DL — SIGNIFICANT CHANGE UP (ref 3.3–5)
ALP SERPL-CCNC: 50 U/L — SIGNIFICANT CHANGE UP (ref 40–120)
ALT FLD-CCNC: 28 U/L — SIGNIFICANT CHANGE UP (ref 10–45)
ANION GAP SERPL CALC-SCNC: 14 MMOL/L — SIGNIFICANT CHANGE UP (ref 5–17)
ANION GAP SERPL CALC-SCNC: 15 MMOL/L — SIGNIFICANT CHANGE UP (ref 5–17)
APPEARANCE UR: CLEAR — SIGNIFICANT CHANGE UP
AST SERPL-CCNC: 23 U/L — SIGNIFICANT CHANGE UP (ref 10–40)
BILIRUB SERPL-MCNC: 1.3 MG/DL — HIGH (ref 0.2–1.2)
BILIRUB UR-MCNC: NEGATIVE — SIGNIFICANT CHANGE UP
BUN SERPL-MCNC: 13 MG/DL — SIGNIFICANT CHANGE UP (ref 7–23)
BUN SERPL-MCNC: 15 MG/DL — SIGNIFICANT CHANGE UP (ref 7–23)
CALCIUM SERPL-MCNC: 9.2 MG/DL — SIGNIFICANT CHANGE UP (ref 8.4–10.5)
CALCIUM SERPL-MCNC: 9.7 MG/DL — SIGNIFICANT CHANGE UP (ref 8.4–10.5)
CHLORIDE SERPL-SCNC: 95 MMOL/L — LOW (ref 96–108)
CHLORIDE SERPL-SCNC: 97 MMOL/L — SIGNIFICANT CHANGE UP (ref 96–108)
CO2 SERPL-SCNC: 21 MMOL/L — LOW (ref 22–31)
CO2 SERPL-SCNC: 23 MMOL/L — SIGNIFICANT CHANGE UP (ref 22–31)
COLOR SPEC: YELLOW — SIGNIFICANT CHANGE UP
CREAT SERPL-MCNC: 0.95 MG/DL — SIGNIFICANT CHANGE UP (ref 0.5–1.3)
CREAT SERPL-MCNC: 0.99 MG/DL — SIGNIFICANT CHANGE UP (ref 0.5–1.3)
DIFF PNL FLD: NEGATIVE — SIGNIFICANT CHANGE UP
GLUCOSE SERPL-MCNC: 114 MG/DL — HIGH (ref 70–99)
GLUCOSE SERPL-MCNC: 97 MG/DL — SIGNIFICANT CHANGE UP (ref 70–99)
GLUCOSE UR QL: NEGATIVE — SIGNIFICANT CHANGE UP
HCT VFR BLD CALC: 45 % — SIGNIFICANT CHANGE UP (ref 39–50)
HGB BLD-MCNC: 15.7 G/DL — SIGNIFICANT CHANGE UP (ref 13–17)
KETONES UR-MCNC: ABNORMAL
LEUKOCYTE ESTERASE UR-ACNC: NEGATIVE — SIGNIFICANT CHANGE UP
MAGNESIUM SERPL-MCNC: 2 MG/DL — SIGNIFICANT CHANGE UP (ref 1.6–2.6)
MCHC RBC-ENTMCNC: 32.6 PG — SIGNIFICANT CHANGE UP (ref 27–34)
MCHC RBC-ENTMCNC: 34.9 GM/DL — SIGNIFICANT CHANGE UP (ref 32–36)
MCV RBC AUTO: 93.6 FL — SIGNIFICANT CHANGE UP (ref 80–100)
NITRITE UR-MCNC: NEGATIVE — SIGNIFICANT CHANGE UP
OSMOLALITY SERPL: 280 MOS/KG — SIGNIFICANT CHANGE UP (ref 275–300)
OSMOLALITY UR: 749 MOS/KG — SIGNIFICANT CHANGE UP (ref 300–900)
PH UR: 6 — SIGNIFICANT CHANGE UP (ref 5–8)
PHOSPHATE SERPL-MCNC: 2.7 MG/DL — SIGNIFICANT CHANGE UP (ref 2.5–4.5)
PLATELET # BLD AUTO: 201 K/UL — SIGNIFICANT CHANGE UP (ref 150–400)
POTASSIUM SERPL-MCNC: 4 MMOL/L — SIGNIFICANT CHANGE UP (ref 3.5–5.3)
POTASSIUM SERPL-MCNC: 4 MMOL/L — SIGNIFICANT CHANGE UP (ref 3.5–5.3)
POTASSIUM SERPL-SCNC: 4 MMOL/L — SIGNIFICANT CHANGE UP (ref 3.5–5.3)
POTASSIUM SERPL-SCNC: 4 MMOL/L — SIGNIFICANT CHANGE UP (ref 3.5–5.3)
PROT SERPL-MCNC: 7.3 G/DL — SIGNIFICANT CHANGE UP (ref 6–8.3)
PROT UR-MCNC: SIGNIFICANT CHANGE UP
RBC # BLD: 4.8 M/UL — SIGNIFICANT CHANGE UP (ref 4.2–5.8)
RBC # FLD: 11.8 % — SIGNIFICANT CHANGE UP (ref 10.3–14.5)
SODIUM SERPL-SCNC: 132 MMOL/L — LOW (ref 135–145)
SODIUM SERPL-SCNC: 133 MMOL/L — LOW (ref 135–145)
SODIUM UR-SCNC: 53 MMOL/L — SIGNIFICANT CHANGE UP
SP GR SPEC: 1.02 — SIGNIFICANT CHANGE UP (ref 1.01–1.02)
URATE SERPL-MCNC: 6.7 MG/DL — SIGNIFICANT CHANGE UP (ref 3.4–8.8)
UROBILINOGEN FLD QL: 1 MG/DL
WBC # BLD: 9.4 K/UL — SIGNIFICANT CHANGE UP (ref 3.8–10.5)
WBC # FLD AUTO: 9.4 K/UL — SIGNIFICANT CHANGE UP (ref 3.8–10.5)

## 2018-05-06 PROCEDURE — 99233 SBSQ HOSP IP/OBS HIGH 50: CPT | Mod: GC

## 2018-05-06 RX ORDER — MORPHINE SULFATE 50 MG/1
2 CAPSULE, EXTENDED RELEASE ORAL ONCE
Qty: 0 | Refills: 0 | Status: DISCONTINUED | OUTPATIENT
Start: 2018-05-06 | End: 2018-05-06

## 2018-05-06 RX ORDER — OXYCODONE AND ACETAMINOPHEN 5; 325 MG/1; MG/1
2 TABLET ORAL EVERY 4 HOURS
Qty: 0 | Refills: 0 | Status: DISCONTINUED | OUTPATIENT
Start: 2018-05-06 | End: 2018-05-08

## 2018-05-06 RX ORDER — KETOROLAC TROMETHAMINE 30 MG/ML
15 SYRINGE (ML) INJECTION EVERY 6 HOURS
Qty: 0 | Refills: 0 | Status: DISCONTINUED | OUTPATIENT
Start: 2018-05-06 | End: 2018-05-08

## 2018-05-06 RX ADMIN — MORPHINE SULFATE 2 MILLIGRAM(S): 50 CAPSULE, EXTENDED RELEASE ORAL at 01:22

## 2018-05-06 RX ADMIN — Medication 15 MILLIGRAM(S): at 12:11

## 2018-05-06 RX ADMIN — Medication 100 MILLIGRAM(S): at 13:10

## 2018-05-06 RX ADMIN — MORPHINE SULFATE 2 MILLIGRAM(S): 50 CAPSULE, EXTENDED RELEASE ORAL at 09:10

## 2018-05-06 RX ADMIN — LIDOCAINE 2 PATCH: 4 CREAM TOPICAL at 02:26

## 2018-05-06 RX ADMIN — OXYCODONE AND ACETAMINOPHEN 2 TABLET(S): 5; 325 TABLET ORAL at 17:39

## 2018-05-06 RX ADMIN — HEPARIN SODIUM 5000 UNIT(S): 5000 INJECTION INTRAVENOUS; SUBCUTANEOUS at 05:13

## 2018-05-06 RX ADMIN — Medication 100 MILLIGRAM(S): at 05:14

## 2018-05-06 RX ADMIN — Medication 15 MILLIGRAM(S): at 12:30

## 2018-05-06 RX ADMIN — CYCLOBENZAPRINE HYDROCHLORIDE 5 MILLIGRAM(S): 10 TABLET, FILM COATED ORAL at 09:38

## 2018-05-06 RX ADMIN — OXYCODONE AND ACETAMINOPHEN 1 TABLET(S): 5; 325 TABLET ORAL at 10:30

## 2018-05-06 RX ADMIN — HEPARIN SODIUM 5000 UNIT(S): 5000 INJECTION INTRAVENOUS; SUBCUTANEOUS at 21:33

## 2018-05-06 RX ADMIN — OXYCODONE AND ACETAMINOPHEN 1 TABLET(S): 5; 325 TABLET ORAL at 09:38

## 2018-05-06 RX ADMIN — Medication 100 MILLIGRAM(S): at 21:33

## 2018-05-06 RX ADMIN — SENNA PLUS 2 TABLET(S): 8.6 TABLET ORAL at 21:33

## 2018-05-06 RX ADMIN — HEPARIN SODIUM 5000 UNIT(S): 5000 INJECTION INTRAVENOUS; SUBCUTANEOUS at 13:10

## 2018-05-06 RX ADMIN — OXYCODONE AND ACETAMINOPHEN 1 TABLET(S): 5; 325 TABLET ORAL at 05:14

## 2018-05-06 RX ADMIN — CYCLOBENZAPRINE HYDROCHLORIDE 5 MILLIGRAM(S): 10 TABLET, FILM COATED ORAL at 17:51

## 2018-05-06 RX ADMIN — OXYCODONE AND ACETAMINOPHEN 1 TABLET(S): 5; 325 TABLET ORAL at 06:45

## 2018-05-06 RX ADMIN — MORPHINE SULFATE 2 MILLIGRAM(S): 50 CAPSULE, EXTENDED RELEASE ORAL at 01:40

## 2018-05-06 RX ADMIN — LIDOCAINE 2 PATCH: 4 CREAM TOPICAL at 12:12

## 2018-05-06 RX ADMIN — OXYCODONE AND ACETAMINOPHEN 2 TABLET(S): 5; 325 TABLET ORAL at 14:00

## 2018-05-06 RX ADMIN — Medication 15 MILLIGRAM(S): at 17:51

## 2018-05-06 RX ADMIN — Medication 15 MILLIGRAM(S): at 18:15

## 2018-05-06 RX ADMIN — OXYCODONE AND ACETAMINOPHEN 2 TABLET(S): 5; 325 TABLET ORAL at 16:53

## 2018-05-06 RX ADMIN — OXYCODONE AND ACETAMINOPHEN 2 TABLET(S): 5; 325 TABLET ORAL at 13:11

## 2018-05-06 RX ADMIN — MORPHINE SULFATE 2 MILLIGRAM(S): 50 CAPSULE, EXTENDED RELEASE ORAL at 08:47

## 2018-05-06 NOTE — PROGRESS NOTE ADULT - PROBLEM SELECTOR PLAN 2
Lumbar paraspinal muscle tenderness on examination; pain x3 days; uncontrolled  - c/w flexeril 5 mg TID  - Tylenol PRN; however, not effective  - lidocaine patch  - percocet prn. Will consider increasing the percocet dose.  - f/u lumbosacral X-ray; will probably need to get MRI Lumbar paraspinal muscle tenderness on examination; pain x3 days; uncontrolled  - c/w flexeril 5 mg TID  - Tylenol PRN; however, not effective  - lidocaine patch  - percocet tabs increased; toradol IV prn ordered   - f/u lumbosacral X-ray report; MRI incompatible with Bravo device

## 2018-05-06 NOTE — PROGRESS NOTE ADULT - PROBLEM SELECTOR PLAN 8
DVT: HSQ  GOC: full code  Dispo: PT fran DVT: HSQ  GOC: full code  Dispo: once pain is more controlled; will offer PT eval

## 2018-05-06 NOTE — CHART NOTE - NSCHARTNOTEFT_GEN_A_CORE
Notified by RN that patient was complaining of severe pain in the back. Went to assess patient who appears to be in a significant amount of distress, reporting it as an 8/10. Discussed risks of opioid medications and discussed other interventions. Patient verbalizes understanding and explains that he has been in pain throughout the night, and was wondering if NSGY would come by to evaluate him. Spoke with orthopedics earlier in the night: will come by if NSGY cannot come here. 2mg morphine ordered and will continue monitoring with the understanding that we have to be careful with narcotics administering.

## 2018-05-06 NOTE — PROGRESS NOTE ADULT - PROBLEM SELECTOR PLAN 3
- Na 132 this morning. Unclear cause. DDx: SIADH 2/2 pain, dehydration. Patient appears euvolemic.   - UA, urine na, urine osm, serum osm, serum uric acid ordered. Will repeat BMP this afternoon.

## 2018-05-06 NOTE — PROGRESS NOTE ADULT - SUBJECTIVE AND OBJECTIVE BOX
Patient is a 57y old  Male who presents with a chief complaint of "here for EGD with Bravo placement" (04 May 2018 15:15)    Overnight events: pt is having significant back pain. Overnight, received morphine 2mg x 1. Pain is 8/10 when patient was examined.     MEDICATIONS  (STANDING):  heparin  Injectable 5000 Unit(s) SubCutaneous every 8 hours  lidocaine   Patch 2 Patch Transdermal daily    MEDICATIONS  (PRN):  acetaminophen   Tablet. 650 milliGRAM(s) Oral every 6 hours PRN Mild Pain (1 - 3)  benzocaine 15 mG/menthol 3.6 mG Lozenge 1 Lozenge Oral every 2 hours PRN Sore Throat  cyclobenzaprine 5 milliGRAM(s) Oral three times a day PRN Muscle Spasm  oxyCODONE    5 mG/acetaminophen 325 mG 1 Tablet(s) Oral every 4 hours PRN Severe Pain (7 - 10)    Objective:    Vital Signs Last 24 Hrs  T(C): 37.2 (06 May 2018 04:12), Max: 37.4 (05 May 2018 21:47)  T(F): 98.9 (06 May 2018 04:12), Max: 99.3 (05 May 2018 21:47)  HR: 92 (06 May 2018 08:46) (72 - 92)  BP: 106/71 (06 May 2018 08:46) (105/68 - 112/76)  BP(mean): --  RR: 20 (06 May 2018 08:46) (18 - 20)  SpO2: 93% (06 May 2018 08:46) (93% - 98%)             05-05 @ 07:01  -  05-06 @ 07:00  --------------------------------------------------------  IN: 1160 mL / OUT: 700 mL / NET: 460 mL        PHYSICAL EXAM:  GENERAL: mild to mod distress 2/2 pain, obese   HEAD:  Atraumatic, Normocephalic  EYES: PERLLA, sclera clear  ENMT: mmm  CHEST/LUNG: Clear to percussion bilaterally; No rales, rhonchi, wheezing, or rubs  HEART: Regular rate and rhythm; No murmurs, rubs, or gallops  ABDOMEN: Soft, Nontender, Nondistended; Bowel sounds present  BACK: paraspinal TTP  EXTREMITIES:  no edema                                   LABS:                          15.7   9.4   )-----------( 201      ( 06 May 2018 07:07 )             45.0       05-06    132<L>  |  95<L>  |  13  ----------------------------<  97  4.0   |  23  |  0.95    Ca    9.7      06 May 2018 07:07  Phos  2.7     05-06  Mg     2.0     05-06    TPro  7.3  /  Alb  4.1  /  TBili  1.3<H>  /  DBili  x   /  AST  23  /  ALT  28  /  AlkPhos  50  05-06              PT/INR - ( 04 May 2018 20:54 )   PT: 12.9 sec;   INR: 1.18 ratio         PTT - ( 04 May 2018 20:54 )  PTT:28.7 sec          RADIOLOGY & ADDITIONAL TESTS:    Imaging Personally Reviewed:  [ ] YES  [ ] NO    Consultants involved in case:   Consultant(s) Notes Reviewed:  [ ] YES  [ ] NO:   Care Discussed with Consultants/Other Providers [x ] YES  [ ] NO  Discussed care with outpatient neurosurgeon who stated that he does not have privileges to see patients here. Is willing to see him outpatient as early as tomorrow if pain is well controlled

## 2018-05-06 NOTE — PROGRESS NOTE ADULT - PROBLEM SELECTOR PLAN 1
H/o H pylori infection in 2016 s/p Quad therapy, on acid suppression therapy with symptoms presented for Bravo placement; EGD with erosive esophagitis.   - Monitor for post procedure complications  - No MRI  - No Acid suppressing medications.  - GI follow up   - Resume diet H/o H pylori infection in 2016 s/p Quad therapy, on acid suppression therapy with symptoms presented for Bravo placement; EGD with erosive esophagitis.   - Monitor for post procedure complications   - No MRI  - No Acid suppressing medications.  - GI follow up   - Resume diet

## 2018-05-07 LAB
ALBUMIN SERPL ELPH-MCNC: 4 G/DL — SIGNIFICANT CHANGE UP (ref 3.3–5)
ALP SERPL-CCNC: 52 U/L — SIGNIFICANT CHANGE UP (ref 40–120)
ALT FLD-CCNC: 28 U/L — SIGNIFICANT CHANGE UP (ref 10–45)
ANION GAP SERPL CALC-SCNC: 15 MMOL/L — SIGNIFICANT CHANGE UP (ref 5–17)
AST SERPL-CCNC: 17 U/L — SIGNIFICANT CHANGE UP (ref 10–40)
BILIRUB SERPL-MCNC: 1.2 MG/DL — SIGNIFICANT CHANGE UP (ref 0.2–1.2)
BUN SERPL-MCNC: 15 MG/DL — SIGNIFICANT CHANGE UP (ref 7–23)
CALCIUM SERPL-MCNC: 9.3 MG/DL — SIGNIFICANT CHANGE UP (ref 8.4–10.5)
CHLORIDE SERPL-SCNC: 95 MMOL/L — LOW (ref 96–108)
CO2 SERPL-SCNC: 21 MMOL/L — LOW (ref 22–31)
CREAT SERPL-MCNC: 0.92 MG/DL — SIGNIFICANT CHANGE UP (ref 0.5–1.3)
GLUCOSE SERPL-MCNC: 114 MG/DL — HIGH (ref 70–99)
MAGNESIUM SERPL-MCNC: 2.1 MG/DL — SIGNIFICANT CHANGE UP (ref 1.6–2.6)
PHOSPHATE SERPL-MCNC: 2.8 MG/DL — SIGNIFICANT CHANGE UP (ref 2.5–4.5)
POTASSIUM SERPL-MCNC: 3.6 MMOL/L — SIGNIFICANT CHANGE UP (ref 3.5–5.3)
POTASSIUM SERPL-SCNC: 3.6 MMOL/L — SIGNIFICANT CHANGE UP (ref 3.5–5.3)
PROT SERPL-MCNC: 7.5 G/DL — SIGNIFICANT CHANGE UP (ref 6–8.3)
SODIUM SERPL-SCNC: 131 MMOL/L — LOW (ref 135–145)
URATE SERPL-MCNC: 6.7 MG/DL — SIGNIFICANT CHANGE UP (ref 3.4–8.8)

## 2018-05-07 PROCEDURE — 93971 EXTREMITY STUDY: CPT | Mod: 26

## 2018-05-07 PROCEDURE — 99233 SBSQ HOSP IP/OBS HIGH 50: CPT | Mod: GC

## 2018-05-07 RX ORDER — DIAZEPAM 5 MG
10 TABLET ORAL EVERY 8 HOURS
Qty: 0 | Refills: 0 | Status: DISCONTINUED | OUTPATIENT
Start: 2018-05-07 | End: 2018-05-08

## 2018-05-07 RX ADMIN — OXYCODONE AND ACETAMINOPHEN 2 TABLET(S): 5; 325 TABLET ORAL at 05:17

## 2018-05-07 RX ADMIN — Medication 100 MILLIGRAM(S): at 05:43

## 2018-05-07 RX ADMIN — Medication 15 MILLIGRAM(S): at 21:07

## 2018-05-07 RX ADMIN — HEPARIN SODIUM 5000 UNIT(S): 5000 INJECTION INTRAVENOUS; SUBCUTANEOUS at 05:43

## 2018-05-07 RX ADMIN — LIDOCAINE 2 PATCH: 4 CREAM TOPICAL at 11:58

## 2018-05-07 RX ADMIN — Medication 15 MILLIGRAM(S): at 21:25

## 2018-05-07 RX ADMIN — Medication 10 MILLIGRAM(S): at 17:45

## 2018-05-07 RX ADMIN — OXYCODONE AND ACETAMINOPHEN 2 TABLET(S): 5; 325 TABLET ORAL at 12:30

## 2018-05-07 RX ADMIN — Medication 15 MILLIGRAM(S): at 09:06

## 2018-05-07 RX ADMIN — OXYCODONE AND ACETAMINOPHEN 2 TABLET(S): 5; 325 TABLET ORAL at 11:58

## 2018-05-07 RX ADMIN — HEPARIN SODIUM 5000 UNIT(S): 5000 INJECTION INTRAVENOUS; SUBCUTANEOUS at 21:09

## 2018-05-07 RX ADMIN — Medication 25 MILLIGRAM(S): at 17:45

## 2018-05-07 RX ADMIN — LIDOCAINE 2 PATCH: 4 CREAM TOPICAL at 00:07

## 2018-05-07 RX ADMIN — Medication 15 MILLIGRAM(S): at 00:30

## 2018-05-07 RX ADMIN — Medication 15 MILLIGRAM(S): at 00:04

## 2018-05-07 RX ADMIN — HEPARIN SODIUM 5000 UNIT(S): 5000 INJECTION INTRAVENOUS; SUBCUTANEOUS at 12:37

## 2018-05-07 RX ADMIN — OXYCODONE AND ACETAMINOPHEN 2 TABLET(S): 5; 325 TABLET ORAL at 04:44

## 2018-05-07 RX ADMIN — CYCLOBENZAPRINE HYDROCHLORIDE 5 MILLIGRAM(S): 10 TABLET, FILM COATED ORAL at 00:06

## 2018-05-07 RX ADMIN — Medication 15 MILLIGRAM(S): at 09:35

## 2018-05-07 RX ADMIN — Medication 100 MILLIGRAM(S): at 21:09

## 2018-05-07 RX ADMIN — LIDOCAINE 2 PATCH: 4 CREAM TOPICAL at 23:34

## 2018-05-07 RX ADMIN — SENNA PLUS 2 TABLET(S): 8.6 TABLET ORAL at 21:09

## 2018-05-07 RX ADMIN — Medication 100 MILLIGRAM(S): at 12:37

## 2018-05-07 NOTE — CONSULT NOTE ADULT - SUBJECTIVE AND OBJECTIVE BOX
57 yMale c/o low back pain for 4-5 days duration, per patient and wife it was worse after injuring it lifting heavy objects before he had his EGD procedure, States that it was became worse after procedure and was aggrivated by laying in hospital bed. Patient was walking in room on arrival for exam, says hes able to get around with some spasms at this time. Patietn says it has improved over the past 2-3 days and laying in the chair instead of the hospital bed was helpful. Patient states pain does/not radiate to RLE/LLE. No weakness, No numbness. Patient denies any hx of trauma, fever, or chills. Patient denies any changes in urinary or bowel control.    PMH:  Restless leg syndrome  MARÍA on CPAP  OA (osteoarthritis)  GERD (gastroesophageal reflux disease)    PSH:  History of laminectomy    AH:    Meds: See med rec    T(C): 36.9 (05-07-18 @ 10:00)  HR: 91 (05-07-18 @ 10:00)  BP: 100/65 (05-07-18 @ 10:00)  RR: 18 (05-07-18 @ 05:03)  SpO2: 95% (05-07-18 @ 10:00)  Wt(kg): --    PE Spine:  No TTP over spinous processes, SILT C5-T1 & L2-S1, C5-C8 5/5 BL, L3-S1 5/5 BL, Able to SLR, +Distal pulses, (-) vasquez, (-) clonus, (-) SLR, (-) babinski  Pain is is in his musculature, Left sided paraspinal and lumbar muscles feel hypertonic and ropy    Secondary:  No TTP over bony landmarks, SILT BL, ROM intact BL, distal pulses palpable.    Imaging:  XR Lumbar spine reveals no concerning pathology

## 2018-05-07 NOTE — CHART NOTE - NSCHARTNOTEFT_GEN_A_CORE
Called to bedside by nurse for evaluation of R arm swelling and pain. Patient's wife present and informed me of a hx of DVT in his LE that was treated with coumadin for 6 months. Patient developed R arm pain in the morning that has had no improvement, Starts in the mid forearm and extends to the digits, patient seems to express digits 4 and 5 are most affected. He states it is a 8/10 and feels numbness/tingling. He received IV Toradol that improved the pain. Patient denies SOB, chest pain, and palpitation.    Physical exam  V/S: /73 HR 81 SpO2 96% on RA  Gen: NAD  Ext: Right Upper Extremity  more edematous compared to Left Upper Extremity, warmth/erythema is not comparable as I removed hot pack from patients Right Upper Extremity.  Neuro: +2 reflex, sensation intact, strength in Right Upper Extremity 5/5    Plan:  -This appears more related to ulnar nerve as per distribution of pain possibly 2/2 sleep position  -Can cont IV toradol as per day team, discussed the risks and benefits of this medication with patient as he has severe GERD, he will agree to use it for this Right Upper Extremity pain  -Cont hot and cold packs  -DVT is on the ddx. As per hx of DVT and patient request, will order Right Upper Extremity doppler and inform day team. Will leave it up to the team if they feel the test is necessary.  -Wells score low, gets a point for hx of DVT.  -cont Hep SQ    Pranay Ashton M.D. PGY1  [phone] Called to bedside by nurse for evaluation of R arm swelling and pain. Patient's wife present and informed me of a hx of DVT in his LE that was treated with coumadin for 6 months. Patient developed R arm pain in the morning that has had no improvement, Starts in the mid forearm and extends to the digits, patient seems to express digits 4 and 5 are most affected. He states it is a 8/10 and feels numbness/tingling. He received IV Toradol that improved the pain. Patient denies SOB, chest pain, and palpitation.    Physical exam  V/S: /73 HR 81 SpO2 96% on RA  Gen: NAD  Ext: Right Upper Extremity  more edematous compared to Left Upper Extremity, warmth/erythema is not comparable as I removed hot pack from patients Right Upper Extremity.  Neuro: +2 reflex, sensation intact, strength in Right Upper Extremity 5/5    Plan:  -This appears more related to ulnar nerve as per distribution of pain possibly 2/2 sleep position  -Can cont IV toradol as per day team, discussed the risks and benefits of this medication with patient as he has severe GERD, he agreed he will use it for this Right Upper Extremity pain  -Cont hot and cold packs  -DVT is on the ddx. As per hx of DVT and patient request, will order Right Upper Extremity doppler and inform day team. Will leave it up to the team if they feel the test is necessary.  -Wells score low, gets a point for hx of DVT.  -cont Hep SQ    Pranay Ashton M.D. PGY1  [phone]

## 2018-05-07 NOTE — PROGRESS NOTE ADULT - PROBLEM SELECTOR PLAN 3
Na 133 yesterday;  DDx: SIADH 2/2 pain, dehydration though Patient appears euvolemic.   - f/u urine Na, serum/urine osm  - trend BMP  - will add on IVF if needed

## 2018-05-07 NOTE — CONSULT NOTE ADULT - ASSESSMENT
56 yo M w/ Muscle spasms in lower back  Patient improving, able to walk without difficulties today  No Neurologic deficits, no reason to suspect spinal cord/nerve entrapment  Likely muscular in nature from lifting injury  Can follow up as outpatient with Dr. Stein   Can get outpatient MRI if condition not relieved  PT/Stretchin for lower back encouraged  Patient orthopedic stable for DC Home  Pain Control  Valium muscle for muscle relaxor x 24  hours

## 2018-05-07 NOTE — PROGRESS NOTE ADULT - PROBLEM SELECTOR PLAN 5
JOSH PICKED HER 2 SCRIPTS ON 9/27  IL DEVORAH [phone] H/o knee OA; well-controlled  - Tylenol PRN

## 2018-05-07 NOTE — PROGRESS NOTE ADULT - SUBJECTIVE AND OBJECTIVE BOX
Randy Gomez MD  Beeper: [phone] ([facility])/ [id number] ([facility])     Subjective:    Patient is a 57y old  Male who presents with a chief complaint of EGD with Bravo placement (05 May 2018 13:49)    Overnight events: pt had right arm pain in ulnar distribution, see chart note for further details    MEDICATIONS  (STANDING):  docusate sodium 100 milliGRAM(s) Oral three times a day  heparin  Injectable 5000 Unit(s) SubCutaneous every 8 hours  lidocaine   Patch 2 Patch Transdermal daily  senna 2 Tablet(s) Oral at bedtime    MEDICATIONS  (PRN):  acetaminophen   Tablet. 650 milliGRAM(s) Oral every 6 hours PRN Mild Pain (1 - 3)  benzocaine 15 mG/menthol 3.6 mG Lozenge 1 Lozenge Oral every 2 hours PRN Sore Throat  cyclobenzaprine 5 milliGRAM(s) Oral three times a day PRN Muscle Spasm  ketorolac   Injectable 15 milliGRAM(s) IV Push every 6 hours PRN Severe Pain (7 - 10)  naloxone Injectable 0.4 milliGRAM(s) IV Push once PRN oversedation  oxyCODONE    5 mG/acetaminophen 325 mG 2 Tablet(s) Oral every 4 hours PRN Mild to moderate pain    Objective:    Vitals: Vital Signs Last 24 Hrs  T(C): 36.9 (18 @ 05:03), Max: 37.2 (18 @ 13:54)  T(F): 98.4 (18 @ 05:03), Max: 98.9 (18 @ 13:54)  HR: 81 (18 @ 05:30) (81 - 97)  BP: 113/64 (18 @ 05:03) (106/71 - 113/64)  RR: 18 (18 @ 05:03) (18 - 20)  SpO2: 94% (18 @ 05:30) (91% - 96%)              I&O's Summary    05 May 2018 07:01  -  06 May 2018 07:00  --------------------------------------------------------  IN: 1160 mL / OUT: 700 mL / NET: 460 mL    06 May 2018 07:01  -  07 May 2018 06:16  --------------------------------------------------------  IN: 960 mL / OUT: 600 mL / NET: 360 mL    PHYSICAL EXAM:  GENERAL: NAD  HEAD:  Atraumatic, Normocephalic  EYES: PERLLA, sclera clear  ENMT: mmm  CHEST/LUNG: Clear to percussion bilaterally; No rales, rhonchi, wheezing, or rubs  HEART: Regular rate and rhythm; No murmurs, rubs, or gallops  ABDOMEN: Soft, Nontender, Nondistended; Bowel sounds present  EXTREMITIES:  2+ Peripheral Pulses, no edema                                           LABS:      133<L>  |  97  |  15  ----------------------------<  114<H>  4.0   |  21<L>  |  0.99  05-06    132<L>  |  95<L>  |  13  ----------------------------<  97  4.0   |  23  |  0.95  05-05    137  |  100  |  15  ----------------------------<  92  4.1   |  24  |  0.93    Ca    9.2      06 May 2018 15:23  Ca    9.7      06 May 2018 07:07  Ca    9.4      05 May 2018 09:19  Phos  2.7     05-06  Mg     2.0     05-06    TPro  7.3  /  Alb  4.1  /  TBili  1.3<H>  /  DBili  x   /  AST  23  /  ALT  28  /  AlkPhos  50  05-06  TPro  7.5  /  Alb  4.4  /  TBili  1.1  /  DBili  x   /  AST  19  /  ALT  29  /  AlkPhos  46  05-04                      Urinalysis Basic - ( 06 May 2018 13:36 )    Color: Yellow / Appearance: Clear / S.024 / pH: x  Gluc: x / Ketone: Moderate  / Bili: Negative / Urobili: 1 mg/dL   Blood: x / Protein: Trace / Nitrite: Negative   Leuk Esterase: Negative / RBC: x / WBC x   Sq Epi: x / Non Sq Epi: x / Bacteria: x                                  15.7   9.4   )-----------( 201      ( 06 May 2018 07:07 )             45.0                         15.9   8.7   )-----------( 199      ( 05 May 2018 09:19 )             45.3                         16.1   9.0   )-----------( 208      ( 04 May 2018 20:54 )             46.4     CAPILLARY BLOOD GLUCOSE

## 2018-05-07 NOTE — PROGRESS NOTE ADULT - PROBLEM SELECTOR PLAN 2
Lumbar paraspinal muscle tenderness on examination; pain x3 days; uncontrolled  - c/w flexeril 5 mg TID  - Tylenol PRN; however, not effective  - lidocaine patch  - percocet tabs increased; toradol IV prn ordered   - f/u lumbosacral X-ray report; MRI incompatible with Bravo device - may need CT scan  - f/u ortho recs - will reconsult ortho as they have not yet written a note or made recommendations since Saturday; may need to consult neurosurgery

## 2018-05-07 NOTE — PROGRESS NOTE ADULT - PROBLEM SELECTOR PLAN 1
s/p BRAVO placement for refractory GERD; no current complaints  - Monitor for post procedure complications   - No MRI  - No Acid suppressing medications.  - GI follow up   - c/w diet

## 2018-05-08 VITALS
DIASTOLIC BLOOD PRESSURE: 75 MMHG | TEMPERATURE: 98 F | HEART RATE: 89 BPM | SYSTOLIC BLOOD PRESSURE: 112 MMHG | OXYGEN SATURATION: 95 % | RESPIRATION RATE: 18 BRPM

## 2018-05-08 LAB
ALBUMIN SERPL ELPH-MCNC: 4 G/DL — SIGNIFICANT CHANGE UP (ref 3.3–5)
ALP SERPL-CCNC: 51 U/L — SIGNIFICANT CHANGE UP (ref 40–120)
ALT FLD-CCNC: 29 U/L — SIGNIFICANT CHANGE UP (ref 10–45)
ANION GAP SERPL CALC-SCNC: 13 MMOL/L — SIGNIFICANT CHANGE UP (ref 5–17)
AST SERPL-CCNC: 21 U/L — SIGNIFICANT CHANGE UP (ref 10–40)
BILIRUB SERPL-MCNC: 0.6 MG/DL — SIGNIFICANT CHANGE UP (ref 0.2–1.2)
BUN SERPL-MCNC: 17 MG/DL — SIGNIFICANT CHANGE UP (ref 7–23)
CALCIUM SERPL-MCNC: 9.4 MG/DL — SIGNIFICANT CHANGE UP (ref 8.4–10.5)
CHLORIDE SERPL-SCNC: 98 MMOL/L — SIGNIFICANT CHANGE UP (ref 96–108)
CO2 SERPL-SCNC: 25 MMOL/L — SIGNIFICANT CHANGE UP (ref 22–31)
CREAT SERPL-MCNC: 0.88 MG/DL — SIGNIFICANT CHANGE UP (ref 0.5–1.3)
GLUCOSE SERPL-MCNC: 112 MG/DL — HIGH (ref 70–99)
MAGNESIUM SERPL-MCNC: 2.2 MG/DL — SIGNIFICANT CHANGE UP (ref 1.6–2.6)
POTASSIUM SERPL-MCNC: 4 MMOL/L — SIGNIFICANT CHANGE UP (ref 3.5–5.3)
POTASSIUM SERPL-SCNC: 4 MMOL/L — SIGNIFICANT CHANGE UP (ref 3.5–5.3)
PROT SERPL-MCNC: 7.6 G/DL — SIGNIFICANT CHANGE UP (ref 6–8.3)
SODIUM SERPL-SCNC: 136 MMOL/L — SIGNIFICANT CHANGE UP (ref 135–145)

## 2018-05-08 PROCEDURE — 86900 BLOOD TYPING SEROLOGIC ABO: CPT

## 2018-05-08 PROCEDURE — 84300 ASSAY OF URINE SODIUM: CPT

## 2018-05-08 PROCEDURE — 80053 COMPREHEN METABOLIC PANEL: CPT

## 2018-05-08 PROCEDURE — 85610 PROTHROMBIN TIME: CPT

## 2018-05-08 PROCEDURE — 85730 THROMBOPLASTIN TIME PARTIAL: CPT

## 2018-05-08 PROCEDURE — 84550 ASSAY OF BLOOD/URIC ACID: CPT

## 2018-05-08 PROCEDURE — 86901 BLOOD TYPING SEROLOGIC RH(D): CPT

## 2018-05-08 PROCEDURE — 72110 X-RAY EXAM L-2 SPINE 4/>VWS: CPT

## 2018-05-08 PROCEDURE — 81003 URINALYSIS AUTO W/O SCOPE: CPT

## 2018-05-08 PROCEDURE — 83735 ASSAY OF MAGNESIUM: CPT

## 2018-05-08 PROCEDURE — 99239 HOSP IP/OBS DSCHRG MGMT >30: CPT

## 2018-05-08 PROCEDURE — 84100 ASSAY OF PHOSPHORUS: CPT

## 2018-05-08 PROCEDURE — 80048 BASIC METABOLIC PNL TOTAL CA: CPT

## 2018-05-08 PROCEDURE — 88312 SPECIAL STAINS GROUP 1: CPT

## 2018-05-08 PROCEDURE — 88305 TISSUE EXAM BY PATHOLOGIST: CPT

## 2018-05-08 PROCEDURE — 86850 RBC ANTIBODY SCREEN: CPT

## 2018-05-08 PROCEDURE — 83935 ASSAY OF URINE OSMOLALITY: CPT

## 2018-05-08 PROCEDURE — C1889: CPT

## 2018-05-08 PROCEDURE — 83930 ASSAY OF BLOOD OSMOLALITY: CPT

## 2018-05-08 PROCEDURE — 85027 COMPLETE CBC AUTOMATED: CPT

## 2018-05-08 PROCEDURE — 91035 G-ESOPH REFLX TST W/ELECTROD: CPT | Mod: 26

## 2018-05-08 PROCEDURE — 93971 EXTREMITY STUDY: CPT

## 2018-05-08 RX ORDER — PANTOPRAZOLE SODIUM 20 MG/1
40 TABLET, DELAYED RELEASE ORAL
Qty: 0 | Refills: 0 | Status: DISCONTINUED | OUTPATIENT
Start: 2018-05-08 | End: 2018-05-08

## 2018-05-08 RX ORDER — LIDOCAINE 4 G/100G
1 CREAM TOPICAL
Qty: 14 | Refills: 0 | OUTPATIENT
Start: 2018-05-08 | End: 2018-05-21

## 2018-05-08 RX ORDER — RANITIDINE HYDROCHLORIDE 150 MG/1
1 TABLET, FILM COATED ORAL
Qty: 0 | Refills: 0 | COMMUNITY

## 2018-05-08 RX ORDER — PANTOPRAZOLE SODIUM 20 MG/1
1 TABLET, DELAYED RELEASE ORAL
Qty: 30 | Refills: 0 | OUTPATIENT
Start: 2018-05-08 | End: 2018-06-06

## 2018-05-08 RX ORDER — DIAZEPAM 5 MG
1 TABLET ORAL
Qty: 6 | Refills: 0 | OUTPATIENT
Start: 2018-05-08 | End: 2018-05-09

## 2018-05-08 RX ORDER — MELOXICAM 15 MG/1
1 TABLET ORAL
Qty: 0 | Refills: 0 | COMMUNITY

## 2018-05-08 RX ADMIN — HEPARIN SODIUM 5000 UNIT(S): 5000 INJECTION INTRAVENOUS; SUBCUTANEOUS at 05:48

## 2018-05-08 RX ADMIN — Medication 25 MILLIGRAM(S): at 05:48

## 2018-05-08 RX ADMIN — OXYCODONE AND ACETAMINOPHEN 2 TABLET(S): 5; 325 TABLET ORAL at 11:14

## 2018-05-08 RX ADMIN — OXYCODONE AND ACETAMINOPHEN 2 TABLET(S): 5; 325 TABLET ORAL at 12:00

## 2018-05-08 RX ADMIN — Medication 100 MILLIGRAM(S): at 05:48

## 2018-05-08 RX ADMIN — OXYCODONE AND ACETAMINOPHEN 2 TABLET(S): 5; 325 TABLET ORAL at 15:59

## 2018-05-08 RX ADMIN — Medication 100 MILLIGRAM(S): at 14:21

## 2018-05-08 RX ADMIN — OXYCODONE AND ACETAMINOPHEN 2 TABLET(S): 5; 325 TABLET ORAL at 16:45

## 2018-05-08 RX ADMIN — LIDOCAINE 2 PATCH: 4 CREAM TOPICAL at 11:14

## 2018-05-08 RX ADMIN — Medication 10 MILLIGRAM(S): at 04:13

## 2018-05-08 NOTE — PROGRESS NOTE ADULT - PROBLEM SELECTOR PLAN 1
s/p BRAVO placement for refractory GERD; no current complaints  - Monitor for post procedure complications   - No MRI  - No Acid suppressing medications.  - GI follow up for removal   - c/w diet as tolerated

## 2018-05-08 NOTE — PROGRESS NOTE ADULT - PROBLEM SELECTOR PLAN 2
Lumbar paraspinal muscle tenderness on examination; pain x3 days; uncontrolled  - per Ortho, diazepam 10 mg q8 is the best muscle relaxant  - c/w pregabalin 25 BID - will increase as needed   - Tylenol PRN; however, not effective  - lidocaine patch  - percocet tabs increased; toradol IV prn ordered   - no need for further imaging as per ortho if pain resolves with diazepam  - ortho f/u as outpt; ortho c/s has given name and number of outpt f/u

## 2018-05-08 NOTE — PROGRESS NOTE ADULT - ATTENDING COMMENTS
58 yo admitted for monitoring after BRAVO device placement  --follow GI recommendations  Having severe low back pain, long Hx back problems  --Rx as above plus add pregabalin 25mg bid  --review use of NSAIDs with GI, add naprosyn 500 every 12 hours if they agree  --ortho spine eval  Hyponatremia, likely SIADH.   --Fluid restrict, patient instructed, requires ongoing  monitoring of electrolytes and renal function  Upper extremity swelling, in a patient with Hx DVT  --Upper extremity doppler to r/o new DVT  MARÍA  --Use home CPAP    Care discussed in detail with patient and his wife.  All questions and concerns addressed
58 yo admitted for monitoring after BRAVO device placement  --test completed, follow up with GI recommendations  Having severe low back pain, long Hx back problems  --Ortho input greatly appreciated, d/c flexoril, trial of valium  --Rx as above plus add pregabalin 25mg bid  --CAn start Naprosyn 500 every 12 hours and PPI (cleared by GI)    Hyponatremia, likely SIADH. Resolved    Upper extremity swelling, in a patient with Hx DVT  --Upper extremity doppler negative for DVT  MARÍA  --Use home CPAP    Discharge home today.  Discharge time 40 minutes  Care discussed in detail with patient and his wife.  All questions and concerns addressed .
Back pain remains unchanged. Received several doses of Morphine and Dilaudid overnight.   Will obtain spinal imaging given h/o Laminectomy.

## 2018-05-08 NOTE — PROGRESS NOTE ADULT - PROBLEM SELECTOR PROBLEM 7
Nutrition, metabolism, and development symptoms
Need for prophylactic measure

## 2018-05-08 NOTE — PROGRESS NOTE ADULT - SUBJECTIVE AND OBJECTIVE BOX
Randy Gomez MD  Beeper: [phone] ([facility])/ [id number] (J)     Subjective:    Patient is a 57y old  Male who presents with a chief complaint of EGD with Bravo placement (05 May 2018 13:49)    Overnight events: no overnight events     MEDICATIONS  (STANDING):  docusate sodium 100 milliGRAM(s) Oral three times a day  heparin  Injectable 5000 Unit(s) SubCutaneous every 8 hours  lidocaine   Patch 2 Patch Transdermal daily  pregabalin 25 milliGRAM(s) Oral two times a day  senna 2 Tablet(s) Oral at bedtime    MEDICATIONS  (PRN):  acetaminophen   Tablet. 650 milliGRAM(s) Oral every 6 hours PRN Mild Pain (1 - 3)  benzocaine 15 mG/menthol 3.6 mG Lozenge 1 Lozenge Oral every 2 hours PRN Sore Throat  diazepam    Tablet 10 milliGRAM(s) Oral every 8 hours PRN musle spasms  ketorolac   Injectable 15 milliGRAM(s) IV Push every 6 hours PRN Severe Pain (7 - 10)  naloxone Injectable 0.4 milliGRAM(s) IV Push once PRN oversedation  oxyCODONE    5 mG/acetaminophen 325 mG 2 Tablet(s) Oral every 4 hours PRN Mild to moderate pain    Objective:    Vitals: Vital Signs Last 24 Hrs  T(C): 37.2 (05-08-18 @ 04:29), Max: 37.2 (05-08-18 @ 04:29)  T(F): 99 (05-08-18 @ 04:29), Max: 99 (05-08-18 @ 04:29)  HR: 80 (05-08-18 @ 04:29) (80 - 91)  BP: 120/72 (05-08-18 @ 04:29) (100/65 - 120/72)  RR: 18 (05-08-18 @ 04:29) (18 - 18)  SpO2: 95% (05-08-18 @ 04:29) (92% - 95%)              I&O's Summary    PHYSICAL EXAM:  GENERAL: NAD  HEAD:  NCAT   EYES:  PERRLA, conjunctiva and sclera clear  ENMT: mmm  CHEST/LUNG: Clear to percussion bilaterally; No rales, rhonchi, wheezing, or rubs  HEART: Regular rate and rhythm; No murmurs, rubs, or gallops  ABDOMEN: Soft, Nontender, Nondistended; Bowel sounds present  EXTREMITIES:  no edema  BACK: TTP in lumbosacral region L > R                                          LABS:  05-07    131<L>  |  95<L>  |  15  ----------------------------<  114<H>  3.6   |  21<L>  |  0.92  05-06    133<L>  |  97  |  15  ----------------------------<  114<H>  4.0   |  21<L>  |  0.99  05-06    132<L>  |  95<L>  |  13  ----------------------------<  97  4.0   |  23  |  0.95    Ca    9.3      07 May 2018 06:37  Ca    9.2      06 May 2018 15:23  Ca    9.7      06 May 2018 07:07  Phos  2.8     05-07  Mg     2.1     05-07    TPro  7.5  /  Alb  4.0  /  TBili  1.2  /  DBili  x   /  AST  17  /  ALT  28  /  AlkPhos  52  05-07  TPro  7.3  /  Alb  4.1  /  TBili  1.3<H>  /  DBili  x   /  AST  23  /  ALT  28  /  AlkPhos  50  05-06                          15.7   9.4   )-----------( 201      ( 06 May 2018 07:07 )             45.0                         15.9   8.7   )-----------( 199      ( 05 May 2018 09:19 )             45.3     CAPILLARY BLOOD GLUCOSE    RECENT CULTURES:      TELEMETRY:    ECG:    TTE:    RADIOLOGY & ADDITIONAL TESTS:    Imaging Personally Reviewed:  [ ] YES  [ ] NO    Consultants involved in case:   Consultant(s) Notes Reviewed:  [ ] YES  [ ] NO:   Care Discussed with Consultants/Other Providers [ ] YES  [ ] NO

## 2018-05-08 NOTE — PROGRESS NOTE ADULT - PROBLEM SELECTOR PROBLEM 1
Gastroesophageal reflux disease with esophagitis

## 2018-06-10 ENCOUNTER — MOBILE ON CALL (OUTPATIENT)
Age: 58
End: 2018-06-10

## 2018-07-02 ENCOUNTER — APPOINTMENT (OUTPATIENT)
Dept: GASTROENTEROLOGY | Facility: CLINIC | Age: 58
End: 2018-07-02
Payer: COMMERCIAL

## 2018-07-02 VITALS
HEART RATE: 71 BPM | OXYGEN SATURATION: 98 % | RESPIRATION RATE: 14 BRPM | SYSTOLIC BLOOD PRESSURE: 120 MMHG | BODY MASS INDEX: 35.65 KG/M2 | DIASTOLIC BLOOD PRESSURE: 78 MMHG | HEIGHT: 70 IN | WEIGHT: 249 LBS | TEMPERATURE: 97.6 F

## 2018-07-02 DIAGNOSIS — D36.9 BENIGN NEOPLASM, UNSPECIFIED SITE: ICD-10-CM

## 2018-07-02 DIAGNOSIS — Z86.711 PERSONAL HISTORY OF PULMONARY EMBOLISM: ICD-10-CM

## 2018-07-02 PROCEDURE — 99214 OFFICE O/P EST MOD 30 MIN: CPT

## 2018-07-02 RX ORDER — MELOXICAM 15 MG/1
15 TABLET ORAL
Refills: 0 | Status: DISCONTINUED | COMMUNITY
End: 2018-07-02

## 2018-07-02 RX ORDER — ROPINIROLE 5 MG/1
5 TABLET, FILM COATED ORAL
Refills: 0 | Status: DISCONTINUED | COMMUNITY
End: 2018-07-02

## 2018-07-02 RX ORDER — RANITIDINE 150 MG/1
150 TABLET ORAL
Refills: 0 | Status: DISCONTINUED | COMMUNITY
Start: 2018-02-28 | End: 2018-07-02

## 2018-07-28 PROBLEM — Z78.9 ALCOHOL USE: Status: ACTIVE | Noted: 2018-02-27

## 2018-08-13 PROBLEM — G25.81 RESTLESS LEGS SYNDROME: Chronic | Status: ACTIVE | Noted: 2018-05-04

## 2018-08-13 PROBLEM — K21.9 GASTRO-ESOPHAGEAL REFLUX DISEASE WITHOUT ESOPHAGITIS: Chronic | Status: ACTIVE | Noted: 2018-05-04

## 2018-08-31 ENCOUNTER — OUTPATIENT (OUTPATIENT)
Dept: OUTPATIENT SERVICES | Facility: HOSPITAL | Age: 58
LOS: 1 days | End: 2018-08-31
Payer: COMMERCIAL

## 2018-08-31 ENCOUNTER — APPOINTMENT (OUTPATIENT)
Dept: GASTROENTEROLOGY | Facility: HOSPITAL | Age: 58
End: 2018-08-31

## 2018-08-31 ENCOUNTER — RESULT REVIEW (OUTPATIENT)
Age: 58
End: 2018-08-31

## 2018-08-31 DIAGNOSIS — Z98.890 OTHER SPECIFIED POSTPROCEDURAL STATES: Chronic | ICD-10-CM

## 2018-08-31 DIAGNOSIS — K22.710 BARRETT'S ESOPHAGUS WITH LOW GRADE DYSPLASIA: ICD-10-CM

## 2018-08-31 PROCEDURE — 88305 TISSUE EXAM BY PATHOLOGIST: CPT

## 2018-08-31 PROCEDURE — 43239 EGD BIOPSY SINGLE/MULTIPLE: CPT

## 2018-08-31 PROCEDURE — 88305 TISSUE EXAM BY PATHOLOGIST: CPT | Mod: 26

## 2018-10-08 ENCOUNTER — APPOINTMENT (OUTPATIENT)
Dept: GASTROENTEROLOGY | Facility: CLINIC | Age: 58
End: 2018-10-08
Payer: COMMERCIAL

## 2018-10-08 VITALS
WEIGHT: 260 LBS | BODY MASS INDEX: 37.22 KG/M2 | HEIGHT: 70 IN | OXYGEN SATURATION: 98 % | DIASTOLIC BLOOD PRESSURE: 86 MMHG | SYSTOLIC BLOOD PRESSURE: 100 MMHG | HEART RATE: 72 BPM

## 2018-10-08 DIAGNOSIS — K21.9 GASTRO-ESOPHAGEAL REFLUX DISEASE W/OUT ESOPHAGITIS: ICD-10-CM

## 2018-10-08 PROBLEM — D36.9 ADENOMATOUS POLYP: Status: ACTIVE | Noted: 2018-04-02

## 2018-10-08 PROBLEM — Z86.711 HISTORY OF PULMONARY EMBOLISM: Status: RESOLVED | Noted: 2018-04-02 | Resolved: 2018-10-08

## 2018-10-08 PROCEDURE — 99214 OFFICE O/P EST MOD 30 MIN: CPT

## 2018-11-29 ENCOUNTER — RESULT REVIEW (OUTPATIENT)
Age: 58
End: 2018-11-29

## 2018-11-30 ENCOUNTER — APPOINTMENT (OUTPATIENT)
Dept: GASTROENTEROLOGY | Facility: HOSPITAL | Age: 58
End: 2018-11-30

## 2018-11-30 ENCOUNTER — OUTPATIENT (OUTPATIENT)
Dept: OUTPATIENT SERVICES | Facility: HOSPITAL | Age: 58
LOS: 1 days | End: 2018-11-30
Payer: COMMERCIAL

## 2018-11-30 DIAGNOSIS — K22.70 BARRETT'S ESOPHAGUS WITHOUT DYSPLASIA: ICD-10-CM

## 2018-11-30 DIAGNOSIS — Z98.890 OTHER SPECIFIED POSTPROCEDURAL STATES: Chronic | ICD-10-CM

## 2018-11-30 PROCEDURE — 43239 EGD BIOPSY SINGLE/MULTIPLE: CPT

## 2018-11-30 PROCEDURE — 88305 TISSUE EXAM BY PATHOLOGIST: CPT | Mod: 26,59

## 2018-11-30 PROCEDURE — 88305 TISSUE EXAM BY PATHOLOGIST: CPT

## 2019-01-07 ENCOUNTER — CHART COPY (OUTPATIENT)
Age: 59
End: 2019-01-07

## 2019-01-28 ENCOUNTER — APPOINTMENT (OUTPATIENT)
Dept: GASTROENTEROLOGY | Facility: CLINIC | Age: 59
End: 2019-01-28

## 2019-01-30 ENCOUNTER — APPOINTMENT (OUTPATIENT)
Dept: GASTROENTEROLOGY | Facility: CLINIC | Age: 59
End: 2019-01-30
Payer: COMMERCIAL

## 2019-01-30 VITALS
RESPIRATION RATE: 14 BRPM | SYSTOLIC BLOOD PRESSURE: 120 MMHG | BODY MASS INDEX: 38.65 KG/M2 | HEART RATE: 75 BPM | WEIGHT: 270 LBS | OXYGEN SATURATION: 98 % | TEMPERATURE: 97.9 F | HEIGHT: 70 IN | DIASTOLIC BLOOD PRESSURE: 78 MMHG

## 2019-01-30 PROCEDURE — 99215 OFFICE O/P EST HI 40 MIN: CPT

## 2019-01-30 RX ORDER — ESOMEPRAZOLE MAGNESIUM 20 MG/1
20 CAPSULE, DELAYED RELEASE ORAL DAILY
Qty: 90 | Refills: 3 | Status: DISCONTINUED | COMMUNITY
Start: 2018-04-16 | End: 2019-01-30

## 2019-01-30 NOTE — HISTORY OF PRESENT ILLNESS
[Heartburn] : denies heartburn [Nausea] : denies nausea [Vomiting] : denies vomiting [Diarrhea] : denies diarrhea [Constipation] : denies constipation [Yellow Skin Or Eyes (Jaundice)] : denies jaundice [Abdominal Pain] : denies abdominal pain [de-identified] : 58 year old male with GERD, obesity, MARÍA using CPAP and short segment Gill's with LGD.\par \par Patient diagnosed with focal LGD on one exam but subsequent exams without dysplasia.\par \par No complaints today. \par \par Refux is well controlled on PPI. \par \par Pt avoiding spicy food.

## 2019-01-30 NOTE — PHYSICAL EXAM
[General Appearance - Alert] : alert [General Appearance - In No Acute Distress] : in no acute distress [Sclera] : the sclera and conjunctiva were normal [PERRL With Normal Accommodation] : pupils were equal in size, round, and reactive to light [Outer Ear] : the ears and nose were normal in appearance [Both Tympanic Membranes Were Examined] : both tympanic membranes were normal [Neck Appearance] : the appearance of the neck was normal [] : no respiratory distress [Respiration, Rhythm And Depth] : normal respiratory rhythm and effort [Bowel Sounds] : normal bowel sounds [Abdomen Soft] : soft [Cervical Lymph Nodes Enlarged Posterior Bilaterally] : posterior cervical [Cervical Lymph Nodes Enlarged Anterior Bilaterally] : anterior cervical [Abnormal Walk] : normal gait [Musculoskeletal - Swelling] : no joint swelling seen

## 2019-01-30 NOTE — CONSULT LETTER
[Dear  ___] : Dear  [unfilled], [Consult Letter:] : I had the pleasure of evaluating your patient, [unfilled]. [Please see my note below.] : Please see my note below. [Consult Closing:] : Thank you very much for allowing me to participate in the care of this patient.  If you have any questions, please do not hesitate to contact me. [Sincerely,] : Sincerely, [FreeTextEntry2] : Brandon Camacho MD\par [id number],\par [place] [id number]

## 2019-01-30 NOTE — REVIEW OF SYSTEMS
[As noted in HPI] : as noted in HPI [As Noted in HPI] : as noted in HPI [Negative] : Musculoskeletal

## 2019-01-30 NOTE — ASSESSMENT
[FreeTextEntry1] : 58 year old male with Gill's with LGD on one exam.  \par \par We discussed that dysplasia is focal and can be missed on random biopsies.  We discussed the brice of advnaced imaging for detection of dysplasia. \par \par I will schedule him for EGD with advanced imaging. \par \par I discussed the proposed EGD with VLE with the patient. We I discussed the risks (bleeding, infection, perforation, and anesthesia risks), benefits, and alternatives  with the patient. They agree to proceed. \par \par The patient is scheduled for March 4th.\par \par Thank you for involving me in their care.\par \par Chandu Stewart MD\par Director of Endoscopy\par [facility]\par [facility]\par Phone: [phone]\par Fax [phone]\par

## 2019-02-12 LAB
ALBUMIN SERPL ELPH-MCNC: 4.5 G/DL
ALP BLD-CCNC: 50 U/L
ALT SERPL-CCNC: 42 U/L
ANION GAP SERPL CALC-SCNC: 13 MMOL/L
AST SERPL-CCNC: 26 U/L
BASOPHILS # BLD AUTO: 0.06 K/UL
BASOPHILS NFR BLD AUTO: 1 %
BILIRUB SERPL-MCNC: 0.3 MG/DL
BUN SERPL-MCNC: 17 MG/DL
CALCIUM SERPL-MCNC: 9.7 MG/DL
CHLORIDE SERPL-SCNC: 102 MMOL/L
CO2 SERPL-SCNC: 24 MMOL/L
CREAT SERPL-MCNC: 1.01 MG/DL
EOSINOPHIL # BLD AUTO: 0.18 K/UL
EOSINOPHIL NFR BLD AUTO: 2.9 %
GLUCOSE SERPL-MCNC: 98 MG/DL
HCT VFR BLD CALC: 45.8 %
HGB BLD-MCNC: 15.5 G/DL
IMM GRANULOCYTES NFR BLD AUTO: 0.8 %
INR PPP: 0.94 RATIO
LYMPHOCYTES # BLD AUTO: 2.18 K/UL
LYMPHOCYTES NFR BLD AUTO: 35 %
MAN DIFF?: NORMAL
MCHC RBC-ENTMCNC: 31.6 PG
MCHC RBC-ENTMCNC: 33.8 GM/DL
MCV RBC AUTO: 93.5 FL
MONOCYTES # BLD AUTO: 0.55 K/UL
MONOCYTES NFR BLD AUTO: 8.8 %
NEUTROPHILS # BLD AUTO: 3.2 K/UL
NEUTROPHILS NFR BLD AUTO: 51.5 %
PLATELET # BLD AUTO: 228 K/UL
POTASSIUM SERPL-SCNC: 4.7 MMOL/L
PROT SERPL-MCNC: 7.4 G/DL
PT BLD: 10.7 SEC
RBC # BLD: 4.9 M/UL
RBC # FLD: 13.4 %
SODIUM SERPL-SCNC: 139 MMOL/L
WBC # FLD AUTO: 6.22 K/UL

## 2019-03-04 ENCOUNTER — RESULT REVIEW (OUTPATIENT)
Age: 59
End: 2019-03-04

## 2019-03-04 ENCOUNTER — APPOINTMENT (OUTPATIENT)
Dept: GASTROENTEROLOGY | Facility: HOSPITAL | Age: 59
End: 2019-03-04

## 2019-03-04 ENCOUNTER — INPATIENT (INPATIENT)
Facility: HOSPITAL | Age: 59
LOS: 0 days | Discharge: HOME | DRG: 921 | End: 2019-03-05
Attending: HOSPITALIST | Admitting: HOSPITALIST
Payer: COMMERCIAL

## 2019-03-04 VITALS
RESPIRATION RATE: 16 BRPM | OXYGEN SATURATION: 95 % | DIASTOLIC BLOOD PRESSURE: 97 MMHG | SYSTOLIC BLOOD PRESSURE: 135 MMHG | WEIGHT: 264.55 LBS | HEART RATE: 89 BPM | HEIGHT: 70 IN | TEMPERATURE: 98 F

## 2019-03-04 DIAGNOSIS — E66.01 MORBID (SEVERE) OBESITY DUE TO EXCESS CALORIES: ICD-10-CM

## 2019-03-04 DIAGNOSIS — Z98.890 OTHER SPECIFIED POSTPROCEDURAL STATES: Chronic | ICD-10-CM

## 2019-03-04 DIAGNOSIS — G47.33 OBSTRUCTIVE SLEEP APNEA (ADULT) (PEDIATRIC): ICD-10-CM

## 2019-03-04 DIAGNOSIS — R13.10 DYSPHAGIA, UNSPECIFIED: ICD-10-CM

## 2019-03-04 DIAGNOSIS — K22.710 BARRETT'S ESOPHAGUS WITH LOW GRADE DYSPLASIA: ICD-10-CM

## 2019-03-04 LAB
ALBUMIN SERPL ELPH-MCNC: 4.2 G/DL — SIGNIFICANT CHANGE UP (ref 3.3–5)
ALP SERPL-CCNC: 49 U/L — SIGNIFICANT CHANGE UP (ref 40–120)
ALT FLD-CCNC: 36 U/L — SIGNIFICANT CHANGE UP (ref 10–45)
ANION GAP SERPL CALC-SCNC: 15 MMOL/L — SIGNIFICANT CHANGE UP (ref 5–17)
AST SERPL-CCNC: 23 U/L — SIGNIFICANT CHANGE UP (ref 10–40)
BASOPHILS # BLD AUTO: 0.05 K/UL — SIGNIFICANT CHANGE UP (ref 0–0.2)
BASOPHILS NFR BLD AUTO: 0.4 % — SIGNIFICANT CHANGE UP (ref 0–2)
BILIRUB SERPL-MCNC: 0.5 MG/DL — SIGNIFICANT CHANGE UP (ref 0.2–1.2)
BUN SERPL-MCNC: 17 MG/DL — SIGNIFICANT CHANGE UP (ref 7–23)
CALCIUM SERPL-MCNC: 9.2 MG/DL — SIGNIFICANT CHANGE UP (ref 8.4–10.5)
CHLORIDE SERPL-SCNC: 105 MMOL/L — SIGNIFICANT CHANGE UP (ref 96–108)
CO2 SERPL-SCNC: 20 MMOL/L — LOW (ref 22–31)
CREAT SERPL-MCNC: 0.74 MG/DL — SIGNIFICANT CHANGE UP (ref 0.5–1.3)
EOSINOPHIL # BLD AUTO: 0.01 K/UL — SIGNIFICANT CHANGE UP (ref 0–0.5)
EOSINOPHIL NFR BLD AUTO: 0.1 % — SIGNIFICANT CHANGE UP (ref 0–6)
GLUCOSE SERPL-MCNC: 115 MG/DL — HIGH (ref 70–99)
HCT VFR BLD CALC: 45.9 % — SIGNIFICANT CHANGE UP (ref 39–50)
HGB BLD-MCNC: 15.5 G/DL — SIGNIFICANT CHANGE UP (ref 13–17)
IMM GRANULOCYTES NFR BLD AUTO: 0.6 % — SIGNIFICANT CHANGE UP (ref 0–1.5)
LYMPHOCYTES # BLD AUTO: 1.04 K/UL — SIGNIFICANT CHANGE UP (ref 1–3.3)
LYMPHOCYTES # BLD AUTO: 8.4 % — LOW (ref 13–44)
MCHC RBC-ENTMCNC: 31.4 PG — SIGNIFICANT CHANGE UP (ref 27–34)
MCHC RBC-ENTMCNC: 33.8 GM/DL — SIGNIFICANT CHANGE UP (ref 32–36)
MCV RBC AUTO: 92.9 FL — SIGNIFICANT CHANGE UP (ref 80–100)
MONOCYTES # BLD AUTO: 0.33 K/UL — SIGNIFICANT CHANGE UP (ref 0–0.9)
MONOCYTES NFR BLD AUTO: 2.7 % — SIGNIFICANT CHANGE UP (ref 2–14)
NEUTROPHILS # BLD AUTO: 10.84 K/UL — HIGH (ref 1.8–7.4)
NEUTROPHILS NFR BLD AUTO: 87.8 % — HIGH (ref 43–77)
PLATELET # BLD AUTO: 233 K/UL — SIGNIFICANT CHANGE UP (ref 150–400)
POTASSIUM SERPL-MCNC: 4.1 MMOL/L — SIGNIFICANT CHANGE UP (ref 3.5–5.3)
POTASSIUM SERPL-SCNC: 4.1 MMOL/L — SIGNIFICANT CHANGE UP (ref 3.5–5.3)
PROT SERPL-MCNC: 7.2 G/DL — SIGNIFICANT CHANGE UP (ref 6–8.3)
RBC # BLD: 4.94 M/UL — SIGNIFICANT CHANGE UP (ref 4.2–5.8)
RBC # FLD: 13 % — SIGNIFICANT CHANGE UP (ref 10.3–14.5)
SODIUM SERPL-SCNC: 140 MMOL/L — SIGNIFICANT CHANGE UP (ref 135–145)
WBC # BLD: 12.34 K/UL — HIGH (ref 3.8–10.5)
WBC # FLD AUTO: 12.34 K/UL — HIGH (ref 3.8–10.5)

## 2019-03-04 PROCEDURE — 31575 DIAGNOSTIC LARYNGOSCOPY: CPT

## 2019-03-04 PROCEDURE — 99254 IP/OBS CNSLTJ NEW/EST MOD 60: CPT | Mod: 25

## 2019-03-04 PROCEDURE — 88305 TISSUE EXAM BY PATHOLOGIST: CPT | Mod: 26

## 2019-03-04 PROCEDURE — 71045 X-RAY EXAM CHEST 1 VIEW: CPT | Mod: 26

## 2019-03-04 PROCEDURE — 99223 1ST HOSP IP/OBS HIGH 75: CPT

## 2019-03-04 RX ORDER — HYDROMORPHONE HYDROCHLORIDE 2 MG/ML
0.5 INJECTION INTRAMUSCULAR; INTRAVENOUS; SUBCUTANEOUS ONCE
Qty: 0 | Refills: 0 | Status: DISCONTINUED | OUTPATIENT
Start: 2019-03-04 | End: 2019-03-04

## 2019-03-04 RX ORDER — LIDOCAINE 4 G/100G
15 CREAM TOPICAL EVERY 6 HOURS
Qty: 0 | Refills: 0 | Status: DISCONTINUED | OUTPATIENT
Start: 2019-03-04 | End: 2019-03-05

## 2019-03-04 RX ORDER — PANTOPRAZOLE SODIUM 20 MG/1
40 TABLET, DELAYED RELEASE ORAL EVERY 12 HOURS
Qty: 0 | Refills: 0 | Status: DISCONTINUED | OUTPATIENT
Start: 2019-03-04 | End: 2019-03-05

## 2019-03-04 RX ORDER — BENZOCAINE AND MENTHOL 5; 1 G/100ML; G/100ML
1 LIQUID ORAL EVERY 4 HOURS
Qty: 0 | Refills: 0 | Status: DISCONTINUED | OUTPATIENT
Start: 2019-03-04 | End: 2019-03-05

## 2019-03-04 RX ORDER — DEXAMETHASONE 0.5 MG/5ML
10 ELIXIR ORAL ONCE
Qty: 0 | Refills: 0 | Status: COMPLETED | OUTPATIENT
Start: 2019-03-04 | End: 2019-03-04

## 2019-03-04 RX ADMIN — HYDROMORPHONE HYDROCHLORIDE 0.5 MILLIGRAM(S): 2 INJECTION INTRAMUSCULAR; INTRAVENOUS; SUBCUTANEOUS at 21:22

## 2019-03-04 RX ADMIN — Medication 102 MILLIGRAM(S): at 22:02

## 2019-03-04 RX ADMIN — HYDROMORPHONE HYDROCHLORIDE 0.5 MILLIGRAM(S): 2 INJECTION INTRAMUSCULAR; INTRAVENOUS; SUBCUTANEOUS at 21:37

## 2019-03-04 RX ADMIN — BENZOCAINE AND MENTHOL 1 LOZENGE: 5; 1 LIQUID ORAL at 21:22

## 2019-03-04 NOTE — H&P ADULT - HISTORY OF PRESENT ILLNESS
58 yr old M w/a history of MARÍA on CPAP, GERD, provoked DVT after medical procedure presents after elective scheduled outpatient EGD.  Patient was having procedure to evaluate underlying Andrez esophagus.  Patient was intubated for approx 1 hour and biopsies were taken.  Post procedure patient having pain upon swallowing which he reports makes it difficult to swallow.  As per GI/anesthesia patient to admitted for overnight observation.

## 2019-03-04 NOTE — H&P ADULT - PROBLEM SELECTOR PLAN 1
Patient now with pain upon swallowing.  On my examination oropharynx appears erythematous.  Anesthesiologist does not report traumatic intubation.  Low concern for any perforation as patient breathing comfortably on room air  -As per Anesthesia Dr. Brown ENT has been consulted.  Will await official recs regarding possible role for steroids.    -Will start Lidocaine   -Await offical ENT recs

## 2019-03-04 NOTE — H&P ADULT - ASSESSMENT
58 yr old M w/a PMH of MARÍA, GERD/barrets esophagus presented for elective endoscopy now admitted for post operative monitoring

## 2019-03-04 NOTE — CONSULT NOTE ADULT - SUBJECTIVE AND OBJECTIVE BOX
CC: odynophagia     HPI: 58 yr old M w/a history of MARÍA on CPAP, GERD, provoked DVT after medical procedure presents after elective scheduled outpatient EGD.  Patient was having procedure to evaluate underlying Andrez esophagus.  Patient was intubated for approx 1 hour and biopsies were taken.  Post procedure patient having pain upon swallowing which he reports makes it difficult to swallow.  As per GI/anesthesia patient to admitted for overnight observation.  ENT consulted to evaluate upper airway. No stridor, no SOB, no labored breathing.          PAST MEDICAL & SURGICAL HISTORY:  Restless leg syndrome  MARÍA on CPAP  OA (osteoarthritis)  GERD (gastroesophageal reflux disease)  History of laminectomy    Allergies    No Known Allergies    Intolerances      MEDICATIONS  (STANDING):  benzocaine 15 mG/menthol 3.6 mG Lozenge 1 Lozenge Oral every 4 hours  pantoprazole    Tablet 40 milliGRAM(s) Oral every 12 hours    MEDICATIONS  (PRN):  lidocaine 2% Viscous 15 milliLiter(s) Swish and Spit every 6 hours PRN Sore throat      Social History:   Former 1 PPD smoker 20 years  Social ETOH  No illicit drug use    Family history:   Father  Still living? Unknown  Family history of cerebrovascular accident (CVA), Age at diagnosis: Age Unknown.    ROS:   ENT: all negative except as noted in HPI   CV: denies palpitations  Pulm: denies SOB, cough, hemoptysis  GI: denies change in apetite, indigestion, n/v  : denies pertinent urinary symptoms, urgency  Neuro: denies numbness/tingling, loss of sensation  Psych: denies anxiety  MS: denies muscle weakness, instability  Heme: denies easy bruising or bleeding  Endo: denies heat/cold intolerance, excessive sweating  Vascular: denies LE edema    Vital Signs Last 24 Hrs  T(C): --  T(F): --  HR: --  BP: --  BP(mean): --  RR: --  SpO2: --              PHYSICAL EXAM:  Gen: NAD  Skin: No rashes, bruises, or lesions  Head: Normocephalic, Atraumatic  Face: no edema, erythema, or fluctuance. Parotid glands soft without mass  Eyes: no scleral injection  Nose: Nares bilaterally patent, no discharge  Mouth: No Stridor / Drooling / Trismus.  Mucosa moist, tongue/uvula midline, oropharynx clear  Neck: Flat, supple, no lymphadenopathy, trachea midline, no masses  Lymphatic: No lymphadenopathy  Resp: breathing easily, no stridor  CV: no peripheral edema/cyanosis  GI: nondistended   Peripheral vascular: no JVD or edema  Neuro: facial nerve intact, no facial droop        Fiberoptic Indirect laryngoscopy:  (Scope #2 used):  Reason for Laryngoscopy:    Nasopharynx, oropharynx, and hypopharynx clear, no bleeding. Tongue base, posterior pharyngeal wall, vallecula, epiglottis, and subglottis appear normal. No erythema, edema, pooling of secretions, masses or lesions. Airway patent, no foreign body visualized. No glottic/supraglottic edema. True vocal cords, arytenoids, vestibular folds, ventricles, pyriform sinuses, and aryepiglottic folds appear normal bilaterally. Vocal cords mobile with good contact b/l. Post cricoid hematoma noted on laryngoscopy CC: odynophagia     HPI: 58 yr old M w/a history of MARÍA on CPAP, GERD, provoked DVT after medical procedure presents after elective scheduled outpatient EGD.  Patient was having procedure to evaluate underlying Andrez esophagus.  Patient was intubated for approx 1 hour and biopsies were taken.  Post procedure patient having pain upon swallowing which he reports makes it difficult to swallow.  As per GI/anesthesia patient to admitted for overnight observation.  ENT consulted to evaluate upper airway. No stridor, no SOB, no labored breathing.          PAST MEDICAL & SURGICAL HISTORY:  Restless leg syndrome  MARÍA on CPAP  OA (osteoarthritis)  GERD (gastroesophageal reflux disease)  History of laminectomy    Allergies    No Known Allergies    Intolerances      MEDICATIONS  (STANDING):  benzocaine 15 mG/menthol 3.6 mG Lozenge 1 Lozenge Oral every 4 hours  pantoprazole    Tablet 40 milliGRAM(s) Oral every 12 hours    MEDICATIONS  (PRN):  lidocaine 2% Viscous 15 milliLiter(s) Swish and Spit every 6 hours PRN Sore throat      Social History:   Former 1 PPD smoker 20 years  Social ETOH  No illicit drug use    Family history:   Father  Still living? Unknown  Family history of cerebrovascular accident (CVA), Age at diagnosis: Age Unknown.    ROS:   ENT: all negative except as noted in HPI   CV: denies palpitations  Pulm: denies SOB, cough, hemoptysis  GI: denies change in apetite, indigestion, n/v  : denies pertinent urinary symptoms, urgency  Neuro: denies numbness/tingling, loss of sensation  Psych: denies anxiety  MS: denies muscle weakness, instability  Heme: denies easy bruising or bleeding  Endo: denies heat/cold intolerance, excessive sweating  Vascular: denies LE edema    Vital Signs Last 24 Hrs  T(C): --  T(F): --  HR: --  BP: --  BP(mean): --  RR: --  SpO2: --              PHYSICAL EXAM:  Gen: NAD  Skin: No rashes, bruises, or lesions  Head: Normocephalic, Atraumatic  Face: no edema, erythema, or fluctuance. Parotid glands soft without mass  Eyes: no scleral injection  Nose: Nares bilaterally patent, no discharge  Mouth: No Stridor / Drooling / Trismus.  Mucosa moist, tongue/uvula midline, oropharynx clear  Neck: Flat, supple, no lymphadenopathy, trachea midline, no masses  Lymphatic: No lymphadenopathy  Resp: breathing easily, no stridor  CV: no peripheral edema/cyanosis  GI: nondistended   Peripheral vascular: no JVD or edema  Neuro: facial nerve intact, no facial droop        Fiberoptic Indirect laryngoscopy:  (Scope #2 used):  Reason for Laryngoscopy:    Nasopharynx, oropharynx, and hypopharynx clear, no bleeding. Tongue base, posterior pharyngeal wall, vallecula, epiglottis, and subglottis appear normal. No erythema, edema, pooling of secretions, masses or lesions. Airway patent, no foreign body visualized. No glottic/supraglottic edema. True vocal cords, arytenoids, vestibular folds, ventricles, pyriform sinuses, and aryepiglottic folds appear normal bilaterally. Vocal cords mobile with good contact b/l. small Post cricoid hematoma noted on laryngoscopy

## 2019-03-04 NOTE — CONSULT NOTE ADULT - SUBJECTIVE AND OBJECTIVE BOX
Chief Complaint:  Patient is a 58y old  Male who presents with a chief complaint of     HPI: 58 year old obese man with MARÍA and Gill's with low grade dysplasia underwent outpatient EGD and is admitted for post-procedure monitoring.     Allergies:  No Known Allergies      Home Medications:    Hospital Medications:      PMHX/PSHX:  Restless leg syndrome  MARÍA on CPAP  OA (osteoarthritis)  GERD (gastroesophageal reflux disease)  History of laminectomy      Family history:  Family history of cerebrovascular accident (CVA)      Social History:     Review of systems: Negative, except as otherwise noted above      PHYSICAL EXAM:   Vital Signs:  Vital Signs Last 24 Hrs  T(C): --  T(F): --  HR: --  BP: --  BP(mean): --  RR: --  SpO2: --  Daily     Daily     GENERAL:  No acute distress  HEENT:  Anicteric, no thrush  CHEST:  Non-labored breathing, lungs clear b/l  HEART:  +s1, s2 heart sounds, no murmurs  ABDOMEN:    EXTREMITIES:  warm and well perfused, no edema  SKIN:    NEURO:          LABS:            Imaging: Chief Complaint:  Patient is a 58y old  Male who presents with a chief complaint of     HPI: 58 year old obese man with MARÍA and Gill's with low grade dysplasia underwent outpatient EGD and is admitted for post-procedure monitoring.     Allergies:  No Known Allergies      Home Medications:    Hospital Medications:      PMHX/PSHX:  Restless leg syndrome  MARÍA on CPAP  OA (osteoarthritis)  GERD (gastroesophageal reflux disease)  History of laminectomy      Family history:  Family history of cerebrovascular accident (CVA)      Social History:     Review of systems: Negative, except as otherwise noted above      PHYSICAL EXAM:   Vital Signs:  Vital Signs Last 24 Hrs  T(C): --  T(F): --  HR: --  BP: --  BP(mean): --  RR: --  SpO2: --  Daily     Daily     GENERAL:  No acute distress  HEENT:  Anicteric, no thrush  CHEST:  Non-labored breathing, lungs clear b/l  HEART:  +s1, s2 heart sounds, no murmurs  ABDOMEN:    EXTREMITIES:  warm and well perfused, no edema  SKIN:    NEURO:          LABS:        < from: Upper Endoscopy (03.04.19 @ 11:27) >    [facility]  ____________________________________________________________________________________________________  Patient Name: Kerwin Bauer                    MRN: [id number]  Account Number: [id number]                     YOB: 1960  Room: Endoscopy Room 2                           Gender: Male  Attending MD: Chandu Stewart MD            Procedure Date No Time: 3/4/2019  ____________________________________________________________________________________________________     Procedure:           Upper GI endoscopy  Indications:         58 year old male with Gill's with LGD. EGD scheduled for surveillance.  Providers:           Chandu Stewart MD, Rome Manuel MD (Fellow)  Medicines:   General Anesthesia  Complications:       No immediate complications.  ____________________________________________________________________________________________________  Procedure:           Pre-Anesthesia Assessment:                       - Therisks (bleeding, infection, perforation, anesthesia related risks, and                        pancreatitis if appropriate), benefits, and alternatives were discussed with                        the patient during the consent process. The patient agreed to proceed.                       After obtaining informed consent, the endoscope was passed under direct                        vision. Throughout the procedure, the patient's blood pressure, pulse, and                        oxygen saturations were monitored continuously. The was introduced through                        the mouth, and advanced to the second part of duodenum. The upper GI                        endoscopy was accomplished with ease.                                                            Findings:       EGD:       -The z-line was at 35 cm. The top of the gastric folds was at 37 cm. There was a short        segment of Gill's esophagus. The Benkelman class was C0M2.       -VLE was performed using a 20 mm balloon. The follow was laser marked and biopsied:       S1: distal esophagus/gastric cardia at 37 at 6OC: hyperreflective surface, atypical glands,        and effacement.       S2: distal esophagus/gastric cardia at 37 at 9OC: hyperreflective surface, atypical glands,        and effacement.       -WATS 3D biopsies were performed.       -Jansen protocol biopsies every 1 cm were performed.       -The stomach was normal.       -The duodenal bulb and D2 were normal.                                                                                                  Impression:          EGD:                       -The z-line was at 35 cm. The top of the gastric folds was at 37 cm. There                        was a short segment of Gill's esophagus. The Benkelman class was C0M2.                       -VLE was performed using a 20 mm balloon. The follow was laser marked and                        biopsied:                       S1: distal esophagus/gastric cardia at 37 at 6OC: hyperreflective surface,                        atypical glands, and effacement.                       S2: distal esophagus/gastric cardia at 37 at 9OC: hyperreflective surface,                        atypical glands, and effacement.                   -WATS 3D biopsies were performed.                       -Jansen protocol biopsies every 1 cm were performed.                       -The stomach was normal.                       -The duodenal bulb and D2 were normal.  Recommendation:      - Discharge patient to home (ambulatory).                       - Follow up biopsies.                       - Follow up WATS 3D                                                                                                          ______________________  Chandu Stewart MD  3/4/2019 12:37:06 PM  Number of Addenda: 0    Note Initiated On: 3/4/2019 11:27 AM    < end of copied text >

## 2019-03-05 ENCOUNTER — TRANSCRIPTION ENCOUNTER (OUTPATIENT)
Age: 59
End: 2019-03-05

## 2019-03-05 VITALS
RESPIRATION RATE: 18 BRPM | SYSTOLIC BLOOD PRESSURE: 112 MMHG | OXYGEN SATURATION: 95 % | TEMPERATURE: 98 F | HEART RATE: 83 BPM | DIASTOLIC BLOOD PRESSURE: 75 MMHG

## 2019-03-05 DIAGNOSIS — K22.8 OTHER SPECIFIED DISEASES OF ESOPHAGUS: ICD-10-CM

## 2019-03-05 DIAGNOSIS — E89.810 POSTPROCEDURAL HEMORRHAGE OF AN ENDOCRINE SYSTEM ORGAN OR STRUCTURE FOLLOWING AN ENDOCRINE SYSTEM PROCEDURE: ICD-10-CM

## 2019-03-05 DIAGNOSIS — R13.10 DYSPHAGIA, UNSPECIFIED: ICD-10-CM

## 2019-03-05 LAB
ALBUMIN SERPL ELPH-MCNC: 4.3 G/DL — SIGNIFICANT CHANGE UP (ref 3.3–5)
ALP SERPL-CCNC: 48 U/L — SIGNIFICANT CHANGE UP (ref 40–120)
ALT FLD-CCNC: 34 U/L — SIGNIFICANT CHANGE UP (ref 10–45)
ANION GAP SERPL CALC-SCNC: 15 MMOL/L — SIGNIFICANT CHANGE UP (ref 5–17)
AST SERPL-CCNC: 17 U/L — SIGNIFICANT CHANGE UP (ref 10–40)
BASOPHILS # BLD AUTO: 0.02 K/UL — SIGNIFICANT CHANGE UP (ref 0–0.2)
BASOPHILS NFR BLD AUTO: 0.2 % — SIGNIFICANT CHANGE UP (ref 0–2)
BILIRUB SERPL-MCNC: 0.6 MG/DL — SIGNIFICANT CHANGE UP (ref 0.2–1.2)
BUN SERPL-MCNC: 17 MG/DL — SIGNIFICANT CHANGE UP (ref 7–23)
CALCIUM SERPL-MCNC: 9.4 MG/DL — SIGNIFICANT CHANGE UP (ref 8.4–10.5)
CHLORIDE SERPL-SCNC: 104 MMOL/L — SIGNIFICANT CHANGE UP (ref 96–108)
CO2 SERPL-SCNC: 22 MMOL/L — SIGNIFICANT CHANGE UP (ref 22–31)
CREAT SERPL-MCNC: 0.78 MG/DL — SIGNIFICANT CHANGE UP (ref 0.5–1.3)
EOSINOPHIL # BLD AUTO: 0 K/UL — SIGNIFICANT CHANGE UP (ref 0–0.5)
EOSINOPHIL NFR BLD AUTO: 0 % — SIGNIFICANT CHANGE UP (ref 0–6)
GLUCOSE SERPL-MCNC: 160 MG/DL — HIGH (ref 70–99)
HCT VFR BLD CALC: 44.8 % — SIGNIFICANT CHANGE UP (ref 39–50)
HCV AB S/CO SERPL IA: 0.09 S/CO — SIGNIFICANT CHANGE UP (ref 0–0.79)
HCV AB SERPL-IMP: SIGNIFICANT CHANGE UP
HGB BLD-MCNC: 14.8 G/DL — SIGNIFICANT CHANGE UP (ref 13–17)
IMM GRANULOCYTES NFR BLD AUTO: 0.5 % — SIGNIFICANT CHANGE UP (ref 0–1.5)
LYMPHOCYTES # BLD AUTO: 1.06 K/UL — SIGNIFICANT CHANGE UP (ref 1–3.3)
LYMPHOCYTES # BLD AUTO: 8 % — LOW (ref 13–44)
MCHC RBC-ENTMCNC: 30.6 PG — SIGNIFICANT CHANGE UP (ref 27–34)
MCHC RBC-ENTMCNC: 33 GM/DL — SIGNIFICANT CHANGE UP (ref 32–36)
MCV RBC AUTO: 92.8 FL — SIGNIFICANT CHANGE UP (ref 80–100)
MONOCYTES # BLD AUTO: 0.34 K/UL — SIGNIFICANT CHANGE UP (ref 0–0.9)
MONOCYTES NFR BLD AUTO: 2.6 % — SIGNIFICANT CHANGE UP (ref 2–14)
NEUTROPHILS # BLD AUTO: 11.75 K/UL — HIGH (ref 1.8–7.4)
NEUTROPHILS NFR BLD AUTO: 88.7 % — HIGH (ref 43–77)
PLATELET # BLD AUTO: 246 K/UL — SIGNIFICANT CHANGE UP (ref 150–400)
POTASSIUM SERPL-MCNC: 4.2 MMOL/L — SIGNIFICANT CHANGE UP (ref 3.5–5.3)
POTASSIUM SERPL-SCNC: 4.2 MMOL/L — SIGNIFICANT CHANGE UP (ref 3.5–5.3)
PROT SERPL-MCNC: 7.2 G/DL — SIGNIFICANT CHANGE UP (ref 6–8.3)
RBC # BLD: 4.83 M/UL — SIGNIFICANT CHANGE UP (ref 4.2–5.8)
RBC # FLD: 12.8 % — SIGNIFICANT CHANGE UP (ref 10.3–14.5)
SODIUM SERPL-SCNC: 141 MMOL/L — SIGNIFICANT CHANGE UP (ref 135–145)
WBC # BLD: 13.23 K/UL — HIGH (ref 3.8–10.5)
WBC # FLD AUTO: 13.23 K/UL — HIGH (ref 3.8–10.5)

## 2019-03-05 PROCEDURE — 94660 CPAP INITIATION&MGMT: CPT

## 2019-03-05 PROCEDURE — 99233 SBSQ HOSP IP/OBS HIGH 50: CPT

## 2019-03-05 PROCEDURE — 99232 SBSQ HOSP IP/OBS MODERATE 35: CPT | Mod: GC

## 2019-03-05 PROCEDURE — 88305 TISSUE EXAM BY PATHOLOGIST: CPT

## 2019-03-05 PROCEDURE — 71045 X-RAY EXAM CHEST 1 VIEW: CPT

## 2019-03-05 PROCEDURE — 85027 COMPLETE CBC AUTOMATED: CPT

## 2019-03-05 PROCEDURE — 86803 HEPATITIS C AB TEST: CPT

## 2019-03-05 PROCEDURE — 80053 COMPREHEN METABOLIC PANEL: CPT

## 2019-03-05 RX ORDER — OXYCODONE AND ACETAMINOPHEN 5; 325 MG/1; MG/1
1 TABLET ORAL EVERY 12 HOURS
Qty: 0 | Refills: 0 | Status: DISCONTINUED | OUTPATIENT
Start: 2019-03-05 | End: 2019-03-05

## 2019-03-05 RX ORDER — OXYCODONE HYDROCHLORIDE 5 MG/1
5 TABLET ORAL ONCE
Qty: 0 | Refills: 0 | Status: DISCONTINUED | OUTPATIENT
Start: 2019-03-05 | End: 2019-03-05

## 2019-03-05 RX ORDER — BENZOCAINE AND MENTHOL 5; 1 G/100ML; G/100ML
1 LIQUID ORAL
Qty: 0 | Refills: 0 | COMMUNITY
Start: 2019-03-05

## 2019-03-05 RX ORDER — HYDROMORPHONE HYDROCHLORIDE 2 MG/ML
0.5 INJECTION INTRAMUSCULAR; INTRAVENOUS; SUBCUTANEOUS ONCE
Qty: 0 | Refills: 0 | Status: DISCONTINUED | OUTPATIENT
Start: 2019-03-05 | End: 2019-03-05

## 2019-03-05 RX ADMIN — PANTOPRAZOLE SODIUM 40 MILLIGRAM(S): 20 TABLET, DELAYED RELEASE ORAL at 05:48

## 2019-03-05 RX ADMIN — OXYCODONE AND ACETAMINOPHEN 1 TABLET(S): 5; 325 TABLET ORAL at 14:52

## 2019-03-05 RX ADMIN — BENZOCAINE AND MENTHOL 1 LOZENGE: 5; 1 LIQUID ORAL at 09:33

## 2019-03-05 RX ADMIN — HYDROMORPHONE HYDROCHLORIDE 0.5 MILLIGRAM(S): 2 INJECTION INTRAMUSCULAR; INTRAVENOUS; SUBCUTANEOUS at 02:40

## 2019-03-05 RX ADMIN — BENZOCAINE AND MENTHOL 1 LOZENGE: 5; 1 LIQUID ORAL at 05:48

## 2019-03-05 RX ADMIN — OXYCODONE HYDROCHLORIDE 5 MILLIGRAM(S): 5 TABLET ORAL at 12:30

## 2019-03-05 RX ADMIN — HYDROMORPHONE HYDROCHLORIDE 0.5 MILLIGRAM(S): 2 INJECTION INTRAMUSCULAR; INTRAVENOUS; SUBCUTANEOUS at 07:47

## 2019-03-05 RX ADMIN — HYDROMORPHONE HYDROCHLORIDE 0.5 MILLIGRAM(S): 2 INJECTION INTRAMUSCULAR; INTRAVENOUS; SUBCUTANEOUS at 08:15

## 2019-03-05 RX ADMIN — BENZOCAINE AND MENTHOL 1 LOZENGE: 5; 1 LIQUID ORAL at 02:28

## 2019-03-05 RX ADMIN — OXYCODONE HYDROCHLORIDE 5 MILLIGRAM(S): 5 TABLET ORAL at 11:54

## 2019-03-05 RX ADMIN — HYDROMORPHONE HYDROCHLORIDE 0.5 MILLIGRAM(S): 2 INJECTION INTRAMUSCULAR; INTRAVENOUS; SUBCUTANEOUS at 02:25

## 2019-03-05 NOTE — DISCHARGE NOTE NURSING/CASE MANAGEMENT/SOCIAL WORK - NSDCDPATPORTLINK_GEN_ALL_CORE
You can access the [facility], offered by [facility], by registering with the following website: [email or web address]

## 2019-03-05 NOTE — CONSULT NOTE ADULT - PROBLEM SELECTOR RECOMMENDATION 9
One dose of 10 mg Decadron   soft diet as tolerated   Patient can be discharged in the AM as long as he is able to tolerate a diet. Patient can f/u as an outpatient if symptoms do not improve or worsen
One dose of 10 mg Decadron.   c/w Percocet, Lidocaine 2% viscous, Cepacol sore throat.   soft diet as tolerated    can f/u as an outpatient if symptoms do not improve or worsen.

## 2019-03-05 NOTE — PROGRESS NOTE ADULT - SUBJECTIVE AND OBJECTIVE BOX
Hospitalist- Petros Leon MD  Pager: [phone]  If no response or off-hours, page [phone]  -------------------------------------  Patient is a 58y old  Male who presents with a chief complaint of Post procedure monitoring (05 Mar 2019 14:23)  Subjective: No acute events overnight. Pt noted on ENT scope to have postcricoid hematoma. Today, patient reports odynophagia is improved, he was able to tolerated PO breakfast. No fevers/chills. No sob/cough.    14 point ros otherwise negative.     VITAL SIGNS:  Vital Signs Last 24 Hrs  T(C): 36.4 (05 Mar 2019 11:17), Max: 36.7 (05 Mar 2019 02:12)  T(F): 97.6 (05 Mar 2019 11:17), Max: 98 (05 Mar 2019 02:12)  HR: 86 (05 Mar 2019 14:22) (79 - 89)  BP: 111/69 (05 Mar 2019 11:17) (111/69 - 135/97)  BP(mean): --  RR: 18 (05 Mar 2019 11:17) (16 - 18)  SpO2: 98% (05 Mar 2019 14:22) (95% - 98%)      PHYSICAL EXAM:     GENERAL: no acute distress  HEENT: PERRLA, EOMI, moist oropharynx, +posterior L oropharyngeal aphthous ulcuer, +L anterior cervical tenderness without lyphadenopathy   RESPIRATORY: CTAB, no w/c, no stridor  CARDIOVASCULAR: RRR, no murmurs, gallops, rubs  ABDOMINAL: soft, non-tender, non-distended, positive bowel sounds   EXTREMITIES: no clubbing, cyanosis, or edema  NEUROLOGICAL: alert and oriented x 3, non-focal  SKIN: no rashes or lesions   MUSCULOSKELETAL: no gross joint deformity                          14.8   13.23 )-----------( 246      ( 05 Mar 2019 09:37 )             44.8     03-05    141  |  104  |  17  ----------------------------<  160<H>  4.2   |  22  |  0.78    Ca    9.4      05 Mar 2019 07:11    TPro  7.2  /  Alb  4.3  /  TBili  0.6  /  DBili  x   /  AST  17  /  ALT  34  /  AlkPhos  48  03-05      CAPILLARY BLOOD GLUCOSE          MEDICATIONS  (STANDING):  benzocaine 15 mG/menthol 3.6 mG Lozenge 1 Lozenge Oral every 4 hours  pantoprazole    Tablet 40 milliGRAM(s) Oral every 12 hours    MEDICATIONS  (PRN):  lidocaine 2% Viscous 15 milliLiter(s) Swish and Spit every 6 hours PRN Sore throat  oxyCODONE    5 mG/acetaminophen 325 mG 1 Tablet(s) Oral every 12 hours PRN Severe Pain (7 - 10)

## 2019-03-05 NOTE — DISCHARGE NOTE PROVIDER - CARE PROVIDER_API CALL
Michelle Brown)  Otolaryngology Facial Plastics  [address]  [place] [address]  Phone: [phone]  Fax: [phone]  Follow Up Time:     Chandu Stewart)  Gastroenterology; Internal Medicine  [address] [facility]  [place] [address]  Phone: [phone]  Fax: [phone]  Follow Up Time:

## 2019-03-05 NOTE — PROGRESS NOTE ADULT - SUBJECTIVE AND OBJECTIVE BOX
Chief Complaint:  Patient is a 58y old  Male who presents with a chief complaint of Post procedure monitoring (04 Mar 2019 18:20)      Interval Events: Patient was seen by ENT yesterday - see note in [place]. He feels slightly better today, though still reports L sided neck/throat discomfort. He tolerated a soft diet for breakfast today. He denies nausea, vomiting, melena, hematochezia, hematemesis.     Allergies:  No Known Allergies      Hospital Medications:  benzocaine 15 mG/menthol 3.6 mG Lozenge 1 Lozenge Oral every 4 hours  lidocaine 2% Viscous 15 milliLiter(s) Swish and Spit every 6 hours PRN  pantoprazole    Tablet 40 milliGRAM(s) Oral every 12 hours      PMHX/PSHX:  Restless leg syndrome  MARÍA on CPAP  OA (osteoarthritis)  GERD (gastroesophageal reflux disease)  History of laminectomy      Family history:  Family history of cerebrovascular accident (CVA) (Father)      ROS: Negative, except as otherwise noted above    PHYSICAL EXAM:   Vital Signs:  Vital Signs Last 24 Hrs  T(C): 36.4 (05 Mar 2019 06:31), Max: 36.7 (05 Mar 2019 02:12)  T(F): 97.5 (05 Mar 2019 06:31), Max: 98 (05 Mar 2019 02:12)  HR: 79 (05 Mar 2019 06:31) (79 - 89)  BP: 121/79 (05 Mar 2019 06:31) (121/79 - 135/97)  BP(mean): --  RR: 17 (05 Mar 2019 06:31) (16 - 17)  SpO2: 97% (05 Mar 2019 06:31) (95% - 98%)  Daily Height in cm: 177.8 (04 Mar 2019 20:00)    Daily     GENERAL: No acute distress    HEENT:  Anicteric, no crepitus, mildly tender to palpation over L anterolateral neck  ABDOMEN:  Soft, nontender, no rebound, no guarding  EXTREMITIES:  Warm and well perfused, no edema  SKIN:  No rash  NEURO:   Awake, interactive, following commands    LABS:  CBC Full  -  ( 04 Mar 2019 22:43 )  WBC Count : 12.34 K/uL  Hemoglobin : 15.5 g/dL  Hematocrit : 45.9 %  Platelet Count - Automated : 233 K/uL  Mean Cell Volume : 92.9 fl  Auto Neutrophil # : 10.84 K/uL  Auto Neutrophil % : 87.8 %    03-05 @ 07:11  Na 141 mmol/L  K 4.2 mmol/L  Cl 104 mmol/L  CO2 22 mmol/L  BUN 17 mg/dL  Creat 0.78 mg/dL  Glucose 160 mg/dL  Ca 9.4 mg/dL    Total protein 7.2 g/dL  Albumin 4.3 g/dL  T bili 0.6 mg/dL  Alk phos 48 U/L  AST 17 U/L  ALT 34 U/L

## 2019-03-05 NOTE — CONSULT NOTE ADULT - SUBJECTIVE AND OBJECTIVE BOX
CC:     HPI:   **Include at least 4 modifiers (Onset? Duration? Quality? Radiation? Severity? Laterality? What makes it better or worse?)**      PAST MEDICAL & SURGICAL HISTORY:  Restless leg syndrome  MARÍA on CPAP  OA (osteoarthritis)  GERD (gastroesophageal reflux disease)  History of laminectomy    Allergies    No Known Allergies    Intolerances      MEDICATIONS  (STANDING):  benzocaine 15 mG/menthol 3.6 mG Lozenge 1 Lozenge Oral every 4 hours  pantoprazole    Tablet 40 milliGRAM(s) Oral every 12 hours    MEDICATIONS  (PRN):  lidocaine 2% Viscous 15 milliLiter(s) Swish and Spit every 6 hours PRN Sore throat  oxyCODONE    5 mG/acetaminophen 325 mG 1 Tablet(s) Oral every 12 hours PRN Severe Pain (7 - 10)      Social History: **??**    Family history: **??**    ROS:   ENT: all negative except as noted in HPI   CV: denies palpitations  Pulm: denies SOB, cough, hemoptysis  GI: denies change in apetite, indigestion, n/v  : denies pertinent urinary symptoms, urgency  Neuro: denies numbness/tingling, loss of sensation  Psych: denies anxiety  MS: denies muscle weakness, instability  Heme: denies easy bruising or bleeding  Endo: denies heat/cold intolerance, excessive sweating  Vascular: denies LE edema    Vital Signs Last 24 Hrs  T(C): 36.4 (05 Mar 2019 11:17), Max: 36.7 (05 Mar 2019 02:12)  T(F): 97.6 (05 Mar 2019 11:17), Max: 98 (05 Mar 2019 02:12)  HR: 88 (05 Mar 2019 12:23) (79 - 89)  BP: 111/69 (05 Mar 2019 11:17) (111/69 - 135/97)  BP(mean): --  RR: 18 (05 Mar 2019 11:17) (16 - 18)  SpO2: 98% (05 Mar 2019 12:23) (95% - 98%)                          14.8   13.23 )-----------( 246      ( 05 Mar 2019 09:37 )             44.8    03-05    141  |  104  |  17  ----------------------------<  160<H>  4.2   |  22  |  0.78    Ca    9.4      05 Mar 2019 07:11    TPro  7.2  /  Alb  4.3  /  TBili  0.6  /  DBili  x   /  AST  17  /  ALT  34  /  AlkPhos  48  03-05       PHYSICAL EXAM:  Gen: NAD  Skin: No rashes, bruises, or lesions  Head: Normocephalic, Atraumatic  Face: no edema, erythema, or fluctuance. Parotid glands soft without mass  Eyes: no scleral injection  Ears: Right - ear canal clear, TM intact without effusion or erythema. No evidence of any fluid drainage. No mastoid tenderness, erythema, or ear bulging            Left - ear canal clear, TM intact without effusion or erythema. No evidence of any fluid drainage. No mastoid tenderness, erythema, or ear bulging  Nose: Nares bilaterally patent, no discharge  Mouth: No Stridor / Drooling / Trismus.  Mucosa moist, tongue/uvula midline, oropharynx clear  Neck: Flat, supple, no lymphadenopathy, trachea midline, no masses  Lymphatic: No lymphadenopathy  Resp: breathing easily, no stridor  CV: no peripheral edema/cyanosis  GI: nondistended   Peripheral vascular: no JVD or edema  Neuro: facial nerve intact, no facial droop        Diagnostic Nasal Endoscopy: (Scope #2 used)    Fiberoptic Indirect laryngoscopy:  (Scope #2 used)        IMAGING/ADDITIONAL STUDIES: CC: odynophagia     HPI: 58 yr old M w/a history of MARÍA on CPAP, GERD, provoked DVT after medical procedure presents after elective scheduled outpatient EGD.  Patient was having procedure to evaluate underlying Andrez esophagus.  Patient was intubated for approx 1 hour and biopsies were taken.  Post procedure patient having pain upon swallowing which he reports makes it difficult to swallow.  As per GI/anesthesia patient to admitted for overnight observation.  ENT consulted to evaluate upper airway. No stridor, no SOB, no labored breathing.      PAST MEDICAL & SURGICAL HISTORY:  Restless leg syndrome  MARÍA on CPAP  OA (osteoarthritis)  GERD (gastroesophageal reflux disease)  History of laminectomy    Allergies    No Known Allergies    Intolerances      MEDICATIONS  (STANDING):  benzocaine 15 mG/menthol 3.6 mG Lozenge 1 Lozenge Oral every 4 hours  pantoprazole    Tablet 40 milliGRAM(s) Oral every 12 hours    MEDICATIONS  (PRN):  lidocaine 2% Viscous 15 milliLiter(s) Swish and Spit every 6 hours PRN Sore throat  oxyCODONE    5 mG/acetaminophen 325 mG 1 Tablet(s) Oral every 12 hours PRN Severe Pain (7 - 10)      Social History: Former 1 PPD smoker 20 years  Social ETOH  No illicit drug use    Family history: Father  Still living? Unknown  Family history of cerebrovascular accident (CVA), Age at diagnosis: Age Unknown.    ROS:   ENT: all negative except as noted in HPI   CV: denies palpitations  Pulm: denies SOB, cough, hemoptysis  GI: denies change in apetite, indigestion, n/v  : denies pertinent urinary symptoms, urgency  Neuro: denies numbness/tingling, loss of sensation  Psych: denies anxiety  MS: denies muscle weakness, instability  Heme: denies easy bruising or bleeding  Endo: denies heat/cold intolerance, excessive sweating  Vascular: denies LE edema    Vital Signs Last 24 Hrs  T(C): 36.4 (05 Mar 2019 11:17), Max: 36.7 (05 Mar 2019 02:12)  T(F): 97.6 (05 Mar 2019 11:17), Max: 98 (05 Mar 2019 02:12)  HR: 88 (05 Mar 2019 12:23) (79 - 89)  BP: 111/69 (05 Mar 2019 11:17) (111/69 - 135/97)  BP(mean): --  RR: 18 (05 Mar 2019 11:17) (16 - 18)  SpO2: 98% (05 Mar 2019 12:23) (95% - 98%)                          14.8   13.23 )-----------( 246      ( 05 Mar 2019 09:37 )             44.8    03-05    141  |  104  |  17  ----------------------------<  160<H>  4.2   |  22  |  0.78    Ca    9.4      05 Mar 2019 07:11    TPro  7.2  /  Alb  4.3  /  TBili  0.6  /  DBili  x   /  AST  17  /  ALT  34  /  AlkPhos  48  03-05       PHYSICAL EXAM:  Gen: NAD  Skin: No rashes, bruises, or lesions  Head: Normocephalic, Atraumatic  Face: no edema, erythema, or fluctuance. Parotid glands soft without mass  Eyes: no scleral injection  Nose: Nares bilaterally patent, no discharge  Mouth: No Stridor / Drooling / Trismus.  Mucosa moist, tongue/uvula midline, oropharynx clear  Neck: Flat, supple, no lymphadenopathy, trachea midline, no masses  Lymphatic: No lymphadenopathy  Resp: breathing easily, no stridor  CV: no peripheral edema/cyanosis  GI: nondistended   Peripheral vascular: no JVD or edema  Neuro: facial nerve intact, no facial droop       Fiberoptic Indirect laryngoscopy:  (Scope #2 used)  Reason for Laryngoscopy:   - Left post cricoid Hematoma abutting Left arytenoid  Nasopharynx, oropharynx, and hypopharynx clear, no bleeding. Tongue base, posterior pharyngeal wall, vallecula, epiglottis, and subglottis appear normal. No erythema, edema, pooling of secretions, masses or lesions. Airway patent, no foreign body visualized. No glottic/supraglottic edema. True vocal cords, arytenoids, vestibular folds, ventricles, pyriform sinuses, and aryepiglottic folds appear normal bilaterally. Vocal cords mobile with good contact b/l.     IMAGING/ADDITIONAL STUDIES:

## 2019-03-05 NOTE — PROGRESS NOTE ADULT - ASSESSMENT
Impression:  1. Gill's esophagus with low grade dysplasia s/p EGD/VLE with WATS 3D and [place] protocol biopsies (3/4/19) admitted for post-procedure monitoring    Plan:  - Follow up WATS 3D and biopsies  - Continue soft diet as tolerated  - Cepacol lozenges  - Follow up ENT/anesthesia recs  - Will need outpatient follow up with Dr. Stewart ([phone])

## 2019-03-05 NOTE — DISCHARGE NOTE PROVIDER - HOSPITAL COURSE
58 yr old M w/a history of MARÍA on CPAP, GERD, provoked DVT after medical procedure presents after elective scheduled outpatient EGD.  Patient was having procedure to evaluate underlying Andrez esophagus.  Patient was intubated for approx 1 hour and biopsies were taken.  Post procedure patient having pain upon swallowing which he reports makes it difficult to swallow.  As per GI/anesthesia patient to admitted for overnight observation. 58 yr old M w/a history of MARÍA on CPAP, GERD, provoked DVT after medical procedure presents after elective scheduled outpatient EGD.  Patient was having procedure to evaluate underlying Andrez esophagus.  Patient was intubated for approx 1 hour and biopsies were taken.  Post procedure patient having pain upon swallowing which he reports makes it difficult to swallow.  As per GI/anesthesia patient to admitted for overnight observation.  Tolerating soft diet . seen by ENT, found to have cricoid hematoma.    Received 1 dose of steroid. Started  pain medication. Now pain and dysphagia improved. Patient to F/U with GI, also with ENT if symptoms worsening. Cleared for discharge as per ENT and Dr Leon

## 2019-03-05 NOTE — PROGRESS NOTE ADULT - PROBLEM SELECTOR PLAN 1
2/2 postcricoid hematoma seen on ENT laryngoscope. Improved now with steroid dose  - continue pain control with percocet 5/325 1 tab po BID prn severe pain  - pt with L anterior cervical tenderness on exam- ENT reconsulted to assess whether this may be related to the hematoma.   - tolerating PO

## 2019-03-05 NOTE — CONSULT NOTE ADULT - ASSESSMENT
58 yr old M w/a PMH of MARÍA, GERD/barrets esophagus presented for elective endoscopy now admitted for post operative monitoring s/p traumatic intubation. Laryngoscopy done at bedside, airway patent. Vocal cords visualized and mobile bilaterally but patient was noted with postcricoid hematoma.
58 yr old M w/a PMH of MARÍA, GERD/barrets esophagus presented for elective endoscopy now admitted for post operative monitoring s/p traumatic intubation. Laryngoscopy done at bedside, airway patent. Vocal cords visualized and mobile bilaterally but patient was noted with postcricoid hematoma.
Impression:  1. Gill's esophagus with low grade dysplasia s/p EGD/VLE with WATS 3D and [place] protocol biopsies (3/4/19)    Plan:  - Follow up WATS 3D and biopsies  - Clear liquid diet  - Cepacol lozenges  - Will need outpatient follow up with Dr. Stewart ([phone])

## 2019-03-05 NOTE — PROGRESS NOTE ADULT - PROBLEM SELECTOR PLAN 4
Nutrition consult    ppx: holding due to hematoma  dispo: likely home today pending ENT recs. Pt to f/u with ENT and GI as outpatient. Counseled to avoid NSAIDs for pain given concern for barnes's esophagus.    total discharge time: 36 minutes.

## 2019-03-05 NOTE — DISCHARGE NOTE PROVIDER - NSDCCPCAREPLAN_GEN_ALL_CORE_FT
PRINCIPAL DISCHARGE DIAGNOSIS  Problem: Barretts esophagus  Assessment and Plan of Treatment: PRINCIPAL DISCHARGE DIAGNOSIS  Problem: Barretts esophagus  Assessment and Plan of Treatment: s/p elective endoscopy. C/O throat pain and swallowing difficulty. Admitted for close monitoring.Tolerating soft food.      SECONDARY DISCHARGE DIAGNOSES  Problem: Odynophagia  Assessment and Plan of Treatment: Odynophagia- Likely secondary to traumatic intubation. Seen by ENT. Noted having Cricoid hemorrhage. Had one dose of steroid. Now improved. No signs of active bleeding. PRINCIPAL DISCHARGE DIAGNOSIS  Problem: Barretts esophagus  Assessment and Plan of Treatment: s/p elective endoscopy. C/O throat pain and swallowing difficulty. Admitted for close monitoring.Tolerating soft food.      SECONDARY DISCHARGE DIAGNOSES  Problem: Odynophagia  Assessment and Plan of Treatment: Odynophagia- Likely secondary to traumatic intubation. Seen by ENT. Noted having Cricoid hemorrhage. Had one dose of steroid. Now improved. No signs of active bleeding.    Problem: MARÍA on CPAP  Assessment and Plan of Treatment: MARÍA on CPAP PRINCIPAL DISCHARGE DIAGNOSIS  Problem: Barretts esophagus  Assessment and Plan of Treatment: s/p elective endoscopy. C/O throat pain and swallowing difficulty. Admitted for close monitoring.Tolerating soft food. F/U with GI in 1 week  f/u fx result      SECONDARY DISCHARGE DIAGNOSES  Problem: Odynophagia  Assessment and Plan of Treatment: Odynophagia- Likely secondary to traumatic intubation. Seen by ENT. Noted having Cricoid hemorrhage. Had one dose of steroid. Now improved. No signs of active bleeding. F/U with ENT if symptoms do not improve or worsens    Problem: MARÍA on CPAP  Assessment and Plan of Treatment: MARÍA on CPAP

## 2019-03-11 ENCOUNTER — INPATIENT (INPATIENT)
Facility: HOSPITAL | Age: 59
LOS: 4 days | Discharge: HOME | DRG: 153 | End: 2019-03-16
Attending: INTERNAL MEDICINE | Admitting: INTERNAL MEDICINE
Payer: COMMERCIAL

## 2019-03-11 VITALS
HEART RATE: 97 BPM | DIASTOLIC BLOOD PRESSURE: 96 MMHG | HEIGHT: 70 IN | WEIGHT: 270.07 LBS | OXYGEN SATURATION: 95 % | RESPIRATION RATE: 18 BRPM | TEMPERATURE: 98 F | SYSTOLIC BLOOD PRESSURE: 137 MMHG

## 2019-03-11 DIAGNOSIS — Z98.890 OTHER SPECIFIED POSTPROCEDURAL STATES: Chronic | ICD-10-CM

## 2019-03-11 LAB
ALBUMIN SERPL ELPH-MCNC: 4.5 G/DL — SIGNIFICANT CHANGE UP (ref 3.3–5)
ALP SERPL-CCNC: 51 U/L — SIGNIFICANT CHANGE UP (ref 40–120)
ALT FLD-CCNC: 34 U/L — SIGNIFICANT CHANGE UP (ref 10–45)
ANION GAP SERPL CALC-SCNC: 15 MMOL/L — SIGNIFICANT CHANGE UP (ref 5–17)
AST SERPL-CCNC: 20 U/L — SIGNIFICANT CHANGE UP (ref 10–40)
BASE EXCESS BLDV CALC-SCNC: 0.7 MMOL/L — SIGNIFICANT CHANGE UP (ref -2–2)
BASOPHILS # BLD AUTO: 0 K/UL — SIGNIFICANT CHANGE UP (ref 0–0.2)
BASOPHILS NFR BLD AUTO: 0.3 % — SIGNIFICANT CHANGE UP (ref 0–2)
BILIRUB SERPL-MCNC: 0.5 MG/DL — SIGNIFICANT CHANGE UP (ref 0.2–1.2)
BUN SERPL-MCNC: 19 MG/DL — SIGNIFICANT CHANGE UP (ref 7–23)
CA-I SERPL-SCNC: 1.26 MMOL/L — SIGNIFICANT CHANGE UP (ref 1.12–1.3)
CALCIUM SERPL-MCNC: 10.1 MG/DL — SIGNIFICANT CHANGE UP (ref 8.4–10.5)
CHLORIDE BLDV-SCNC: 109 MMOL/L — HIGH (ref 96–108)
CHLORIDE SERPL-SCNC: 104 MMOL/L — SIGNIFICANT CHANGE UP (ref 96–108)
CO2 BLDV-SCNC: 27 MMOL/L — SIGNIFICANT CHANGE UP (ref 22–30)
CO2 SERPL-SCNC: 23 MMOL/L — SIGNIFICANT CHANGE UP (ref 22–31)
CREAT SERPL-MCNC: 0.91 MG/DL — SIGNIFICANT CHANGE UP (ref 0.5–1.3)
EOSINOPHIL # BLD AUTO: 0.1 K/UL — SIGNIFICANT CHANGE UP (ref 0–0.5)
EOSINOPHIL NFR BLD AUTO: 1.4 % — SIGNIFICANT CHANGE UP (ref 0–6)
GAS PNL BLDV: 140 MMOL/L — SIGNIFICANT CHANGE UP (ref 136–145)
GAS PNL BLDV: SIGNIFICANT CHANGE UP
GAS PNL BLDV: SIGNIFICANT CHANGE UP
GLUCOSE BLDV-MCNC: 96 MG/DL — SIGNIFICANT CHANGE UP (ref 70–99)
GLUCOSE SERPL-MCNC: 100 MG/DL — HIGH (ref 70–99)
HCO3 BLDV-SCNC: 25 MMOL/L — SIGNIFICANT CHANGE UP (ref 21–29)
HCT VFR BLD CALC: 46.7 % — SIGNIFICANT CHANGE UP (ref 39–50)
HCT VFR BLDA CALC: 50 % — SIGNIFICANT CHANGE UP (ref 39–50)
HGB BLD CALC-MCNC: 16.4 G/DL — SIGNIFICANT CHANGE UP (ref 13–17)
HGB BLD-MCNC: 16.2 G/DL — SIGNIFICANT CHANGE UP (ref 13–17)
LACTATE BLDV-MCNC: 1 MMOL/L — SIGNIFICANT CHANGE UP (ref 0.7–2)
LYMPHOCYTES # BLD AUTO: 2.3 K/UL — SIGNIFICANT CHANGE UP (ref 1–3.3)
LYMPHOCYTES # BLD AUTO: 23.8 % — SIGNIFICANT CHANGE UP (ref 13–44)
MCHC RBC-ENTMCNC: 31.6 PG — SIGNIFICANT CHANGE UP (ref 27–34)
MCHC RBC-ENTMCNC: 34.6 GM/DL — SIGNIFICANT CHANGE UP (ref 32–36)
MCV RBC AUTO: 91.3 FL — SIGNIFICANT CHANGE UP (ref 80–100)
MONOCYTES # BLD AUTO: 0.9 K/UL — SIGNIFICANT CHANGE UP (ref 0–0.9)
MONOCYTES NFR BLD AUTO: 9.5 % — SIGNIFICANT CHANGE UP (ref 2–14)
NEUTROPHILS # BLD AUTO: 6.3 K/UL — SIGNIFICANT CHANGE UP (ref 1.8–7.4)
NEUTROPHILS NFR BLD AUTO: 64.9 % — SIGNIFICANT CHANGE UP (ref 43–77)
PCO2 BLDV: 42 MMHG — SIGNIFICANT CHANGE UP (ref 35–50)
PH BLDV: 7.4 — SIGNIFICANT CHANGE UP (ref 7.35–7.45)
PLATELET # BLD AUTO: 227 K/UL — SIGNIFICANT CHANGE UP (ref 150–400)
PO2 BLDV: 35 MMHG — SIGNIFICANT CHANGE UP (ref 25–45)
POTASSIUM BLDV-SCNC: 4 MMOL/L — SIGNIFICANT CHANGE UP (ref 3.5–5.3)
POTASSIUM SERPL-MCNC: 4.3 MMOL/L — SIGNIFICANT CHANGE UP (ref 3.5–5.3)
POTASSIUM SERPL-SCNC: 4.3 MMOL/L — SIGNIFICANT CHANGE UP (ref 3.5–5.3)
PROT SERPL-MCNC: 7.8 G/DL — SIGNIFICANT CHANGE UP (ref 6–8.3)
RBC # BLD: 5.12 M/UL — SIGNIFICANT CHANGE UP (ref 4.2–5.8)
RBC # FLD: 12.2 % — SIGNIFICANT CHANGE UP (ref 10.3–14.5)
SAO2 % BLDV: 65 % — LOW (ref 67–88)
SODIUM SERPL-SCNC: 142 MMOL/L — SIGNIFICANT CHANGE UP (ref 135–145)
WBC # BLD: 9.6 K/UL — SIGNIFICANT CHANGE UP (ref 3.8–10.5)
WBC # FLD AUTO: 9.6 K/UL — SIGNIFICANT CHANGE UP (ref 3.8–10.5)

## 2019-03-11 PROCEDURE — 99285 EMERGENCY DEPT VISIT HI MDM: CPT | Mod: 25

## 2019-03-11 RX ORDER — HYDROMORPHONE HYDROCHLORIDE 2 MG/ML
0.5 INJECTION INTRAMUSCULAR; INTRAVENOUS; SUBCUTANEOUS ONCE
Qty: 0 | Refills: 0 | Status: DISCONTINUED | OUTPATIENT
Start: 2019-03-11 | End: 2019-03-11

## 2019-03-11 RX ORDER — MORPHINE SULFATE 50 MG/1
4 CAPSULE, EXTENDED RELEASE ORAL ONCE
Qty: 0 | Refills: 0 | Status: DISCONTINUED | OUTPATIENT
Start: 2019-03-11 | End: 2019-03-11

## 2019-03-11 RX ORDER — SODIUM CHLORIDE 9 MG/ML
1000 INJECTION INTRAMUSCULAR; INTRAVENOUS; SUBCUTANEOUS ONCE
Qty: 0 | Refills: 0 | Status: COMPLETED | OUTPATIENT
Start: 2019-03-11 | End: 2019-03-11

## 2019-03-11 RX ORDER — DEXAMETHASONE 0.5 MG/5ML
10 ELIXIR ORAL ONCE
Qty: 0 | Refills: 0 | Status: COMPLETED | OUTPATIENT
Start: 2019-03-11 | End: 2019-03-11

## 2019-03-11 RX ADMIN — HYDROMORPHONE HYDROCHLORIDE 0.5 MILLIGRAM(S): 2 INJECTION INTRAMUSCULAR; INTRAVENOUS; SUBCUTANEOUS at 21:10

## 2019-03-11 RX ADMIN — HYDROMORPHONE HYDROCHLORIDE 0.5 MILLIGRAM(S): 2 INJECTION INTRAMUSCULAR; INTRAVENOUS; SUBCUTANEOUS at 20:53

## 2019-03-11 RX ADMIN — HYDROMORPHONE HYDROCHLORIDE 0.5 MILLIGRAM(S): 2 INJECTION INTRAMUSCULAR; INTRAVENOUS; SUBCUTANEOUS at 22:10

## 2019-03-11 RX ADMIN — HYDROMORPHONE HYDROCHLORIDE 0.5 MILLIGRAM(S): 2 INJECTION INTRAMUSCULAR; INTRAVENOUS; SUBCUTANEOUS at 21:55

## 2019-03-11 RX ADMIN — SODIUM CHLORIDE 1000 MILLILITER(S): 9 INJECTION INTRAMUSCULAR; INTRAVENOUS; SUBCUTANEOUS at 23:10

## 2019-03-11 NOTE — ED ADULT NURSE NOTE - NSIMPLEMENTINTERV_GEN_ALL_ED
Implemented All Universal Safety Interventions:  [place] to call system. Call bell, personal items and telephone within reach. Instruct patient to call for assistance. Room bathroom lighting operational. Non-slip footwear when patient is off stretcher. Physically safe environment: no spills, clutter or unnecessary equipment. Stretcher in lowest position, wheels locked, appropriate side rails in place.

## 2019-03-11 NOTE — ED PROVIDER NOTE - PROGRESS NOTE DETAILS
EV Ruiz: reassessed pain and is more controlled after dilaudid 0.5mg. Not requesting further analgesia. ENT Dr. Patrick aware of patient and ENT PA at bedside EV Ruiz: patient scoped by Dr. Patrick who recommended decadron 10mg for edema and possible GI consult to sign off EV Ruiz: patient continues to have pain. Discussed with ENT PA will place patient in CDU for continued pain control, ENT re-eval in AM, and possible GI consult if pain continued. Patient amenable to staying in CDU. will await CDU eval

## 2019-03-11 NOTE — CONSULT NOTE ADULT - SUBJECTIVE AND OBJECTIVE BOX
HPI: 57 y/o male PMHx MARÍA on CPAP, GERD, provoked DVT, Barretts esophagus in which patient had scheduled EGD w/ biopsy to evaluate Barretts on 3/4/19 which was complicated by postcricoid hematoma after intubation and was admitted one night for observation now presenting to the ED for left sided neck pain and difficulty swallowing for 1 week. Patient stated the left sided neck pain is 8/10 and has had no improvement with oxycodone/Tylenol. Patient has been eating yogurt and mashed potatoes and able to tolerate liquids with pain. Patient also has facial/sinus headaches and postnasal drip.  Patient had throat hoarseness for two days that resolved with ice per wife. Patient drooling on himself at night while sleeping since onset of symptoms. Patient seen by ENT Dr. Soren Mukherjee who scoped patient and per wife "Dr. Mukherjee did not like what he saw on the scope". ENT directed patient to hospital for CT scan and IV abx. Patient denied CP, SOB, abdominal pain, N/V/D, fever chills, headache      PAST MEDICAL & SURGICAL HISTORY:  Gill esophagus  Restless leg syndrome  MARÍA on CPAP  OA (osteoarthritis)  GERD (gastroesophageal reflux disease)  History of laminectomy    Allergies    No Known Allergies    Intolerances      MEDICATIONS  (STANDING):    MEDICATIONS  (PRN):      Social History: no tobacco, no etoh     Family history: Pt denies any sign FHx    ROS:   ENT: all negative except as noted in HPI   CV: denies palpitations  Pulm: denies SOB, cough, hemoptysis  GI: denies change in apetite, indigestion, n/v  : denies pertinent urinary symptoms, urgency  Neuro: denies numbness/tingling, loss of sensation  Psych: denies anxiety  MS: denies muscle weakness, instability  Heme: denies easy bruising or bleeding  Endo: denies heat/cold intolerance, excessive sweating  Vascular: denies LE edema    Vital Signs Last 24 Hrs  T(C): 36.6 (11 Mar 2019 18:06), Max: 36.6 (11 Mar 2019 18:06)  T(F): 97.9 (11 Mar 2019 18:06), Max: 97.9 (11 Mar 2019 18:06)  HR: 97 (11 Mar 2019 18:06) (97 - 97)  BP: 137/96 (11 Mar 2019 18:06) (137/96 - 137/96)  BP(mean): --  RR: 18 (11 Mar 2019 18:06) (18 - 18)  SpO2: 95% (11 Mar 2019 18:06) (95% - 95%)                          16.2   9.6   )-----------( 227      ( 11 Mar 2019 20:20 )             46.7    03-11    142  |  104  |  19  ----------------------------<  100<H>  4.3   |  23  |  0.91    Ca    10.1      11 Mar 2019 20:20    TPro  7.8  /  Alb  4.5  /  TBili  0.5  /  DBili  x   /  AST  20  /  ALT  34  /  AlkPhos  51  03-11       PHYSICAL EXAM:  Gen: NAD  Skin: No rashes, bruises, or lesions  Head: Normocephalic, Atraumatic  Face: no edema, erythema, or fluctuance. Parotid glands soft without mass  Eyes: no scleral injection  Nose: Nares bilaterally patent, no discharge  Nasal/Sinus Endoscopy: max sinus with min mucoid fluid b/l via inferior meatus  ss clear b/l  Mouth: No Stridor / Drooling / Trismus.  Mucosa moist, tongue/uvula midline, oropharynx clear  Neck: right neck tender to palpation, minimal edema, no erythema.  supple, no lymphadenopathy, trachea midline, no masses  Lymphatic: No lymphadenopathy  Resp: breathing easily, no stridor  CV: no peripheral edema/cyanosis  GI: nondistended   Peripheral vascular: no JVD or edema  Neuro: facial nerve intact, no facial droop        Fiberoptic Indirect laryngoscopy:  Clear nasopharynx to glottis, mild supraglottis erythema, minimal edema, tvc mobile b/l, airway patent

## 2019-03-11 NOTE — ED ADULT NURSE NOTE - OBJECTIVE STATEMENT
58M to ED c/o hx of a procedure r/t Gill's Esophagus. Pt was intubated during a procedure at this hospital on March 4th. Pt c/o pain and swelling to L side of throat as primary RFV. Pt is alert and oriented x4, calm/cooperative, NAD, VSS. Pt has 18Ga placed to LAC in ED. Spouse at bedside is good historian regarding patient. Pt sounds hoarse when speaking. Small amounts of drool at night. 58M to ED c/o hx of a procedure r/t Gill's Esophagus. Pt was intubated during a procedure at this hospital on March 4th. Pt c/o pain and swelling to L side of throat as primary RFV. Pt is alert and oriented x4, calm/cooperative, NAD, VSS. Pt has 18Ga placed to LAC in ED. Spouse at bedside is good historian regarding patient. Pt sounds hoarse when speaking. Pt airway is intact, patient denies difficulty breathing. Small amounts of drool at night as per spouse.

## 2019-03-11 NOTE — ED PROVIDER NOTE - PMH
Gill esophagus    GERD (gastroesophageal reflux disease)    OA (osteoarthritis)    MARÍA on CPAP    Restless leg syndrome

## 2019-03-11 NOTE — CONSULT NOTE ADULT - ASSESSMENT
Assessment:  57 y/o male PMHx MARÍA on CPAP, GERD, provoked DVT, Barretts esophagus in which patient had scheduled EGD w/ biopsy to evaluate Barretts on 3/4/19 which was complicated by postcricoid hematoma after intubation and was admitted one night for observation now presenting to the ED for left sided neck pain and difficulty swallowing for 1 week. Ddx esophageal perforation, post traumatic pharyngeal erythema. airway widely patent    Plan:  1) f/u offical Cat Scan of Neck read  2) consider GI consult to r/o post procedure complication  3) decadron 10mg IV x 1

## 2019-03-11 NOTE — ED PROVIDER NOTE - CLINICAL SUMMARY MEDICAL DECISION MAKING FREE TEXT BOX
Andrew: Patient with severe left sided throat pain x 8-9 days s/p intubation for EGD. was reported to have cricoid hematoma. f/u with ENT today for severe pain and painful swallowing. will get labs, pain control, ct neck with iv contrast, reassess

## 2019-03-11 NOTE — CONSULT NOTE ADULT - SUBJECTIVE AND OBJECTIVE BOX
CC: neck pain     HPI: 57 y/o male PMHx MARÍA on CPAP, GERD, provoked DVT, Barretts esophagus in which patient had scheduled EGD w/ biopsy to evaluate Barretts on 3/4/19 which was complicated by postcricoid hematoma after intubation and was admitted one night for observation now presenting to the ED for left sided neck pain and difficulty swallowing for 1 week. Patient stated the left sided neck pain is 8/10 and has had no improvement with oxycodone/Tylenol. Patient has been eating yogurt and mashed potatoes and able to tolerate liquids with pain. Patient had throat hoarseness for two days that resolved with ice per wife. Patient drooling on himself at night while sleeping since onset of symptoms. Patient seen by ENT Dr. Soren Mukherjee who scoped patient and per wife "Dr. Mukherjee did not like what he saw on the scope". ENT directed patient to hospital for CT scan and IV abx. Patient denied CP, SOB, abdominal pain, N/V/D, fever chills, headache      PAST MEDICAL & SURGICAL HISTORY:  Gill esophagus  Restless leg syndrome  MARÍA on CPAP  OA (osteoarthritis)  GERD (gastroesophageal reflux disease)  History of laminectomy    Allergies    No Known Allergies    Intolerances      MEDICATIONS  (STANDING):    MEDICATIONS  (PRN):      Social History: no tobacco, no etoh     Family history: Pt denies any sign FHx    ROS:   ENT: all negative except as noted in HPI   CV: denies palpitations  Pulm: denies SOB, cough, hemoptysis  GI: denies change in apetite, indigestion, n/v  : denies pertinent urinary symptoms, urgency  Neuro: denies numbness/tingling, loss of sensation  Psych: denies anxiety  MS: denies muscle weakness, instability  Heme: denies easy bruising or bleeding  Endo: denies heat/cold intolerance, excessive sweating  Vascular: denies LE edema    Vital Signs Last 24 Hrs  T(C): 36.6 (11 Mar 2019 18:06), Max: 36.6 (11 Mar 2019 18:06)  T(F): 97.9 (11 Mar 2019 18:06), Max: 97.9 (11 Mar 2019 18:06)  HR: 97 (11 Mar 2019 18:06) (97 - 97)  BP: 137/96 (11 Mar 2019 18:06) (137/96 - 137/96)  BP(mean): --  RR: 18 (11 Mar 2019 18:06) (18 - 18)  SpO2: 95% (11 Mar 2019 18:06) (95% - 95%)                          16.2   9.6   )-----------( 227      ( 11 Mar 2019 20:20 )             46.7    03-11    142  |  104  |  19  ----------------------------<  100<H>  4.3   |  23  |  0.91    Ca    10.1      11 Mar 2019 20:20    TPro  7.8  /  Alb  4.5  /  TBili  0.5  /  DBili  x   /  AST  20  /  ALT  34  /  AlkPhos  51  03-11       PHYSICAL EXAM:  Gen: NAD  Skin: No rashes, bruises, or lesions  Head: Normocephalic, Atraumatic  Face: no edema, erythema, or fluctuance. Parotid glands soft without mass  Eyes: no scleral injection  Nose: Nares bilaterally patent, no discharge  Mouth: No Stridor / Drooling / Trismus.  Mucosa moist, tongue/uvula midline, oropharynx clear  Neck: right neck tender to palpation, minimal edema, no erythema.  supple, no lymphadenopathy, trachea midline, no masses  Lymphatic: No lymphadenopathy  Resp: breathing easily, no stridor  CV: no peripheral edema/cyanosis  GI: nondistended   Peripheral vascular: no JVD or edema  Neuro: facial nerve intact, no facial droop        Fiberoptic Indirect laryngoscopy:  (Scope #2 used) pending         IMAGING/ADDITIONAL STUDIES:

## 2019-03-11 NOTE — ED PROVIDER NOTE - OBJECTIVE STATEMENT
57 y/o male PMHx MARÍA on CPAP, GERD, provoked DVT, Barretts esophagus in which patient had scheduled EGD w/ biopsy to evaluate Barretts on 3/4/19 which was complicated by postcricoid hematoma after intubation and was admitted one night for observation now presenting to the ED for left sided neck pain and difficulty swallowing for 1 week. Patient stated the left sided neck pain is 8/10 and has had no improvement with oxycodone/Tylenol. Patient has been eating yogurt and mashed potatoes and able to tolerate liquids with pain. Patient had throat hoarseness for two days that resolved with ice per wife. Patient drooling on himself at night while sleeping since onset of symptoms. Patient seen by ENT Dr. Soren Mukherjee who scoped patient and per wife "Dr. Mukherjee did not like what he saw on the scope". ENT directed patient to hospital for CT scan and IV abx. Patient denied CP, SOB, abdominal pain, N/V/D, fever chills, headache

## 2019-03-12 DIAGNOSIS — J02.9 ACUTE PHARYNGITIS, UNSPECIFIED: ICD-10-CM

## 2019-03-12 PROCEDURE — 93010 ELECTROCARDIOGRAM REPORT: CPT

## 2019-03-12 PROCEDURE — 99254 IP/OBS CNSLTJ NEW/EST MOD 60: CPT | Mod: GC

## 2019-03-12 PROCEDURE — 99234 HOSP IP/OBS SM DT SF/LOW 45: CPT

## 2019-03-12 RX ORDER — MORPHINE SULFATE 50 MG/1
4 CAPSULE, EXTENDED RELEASE ORAL EVERY 6 HOURS
Qty: 0 | Refills: 0 | Status: DISCONTINUED | OUTPATIENT
Start: 2019-03-12 | End: 2019-03-14

## 2019-03-12 RX ORDER — AMPICILLIN SODIUM AND SULBACTAM SODIUM 250; 125 MG/ML; MG/ML
INJECTION, POWDER, FOR SUSPENSION INTRAMUSCULAR; INTRAVENOUS
Qty: 0 | Refills: 0 | Status: DISCONTINUED | OUTPATIENT
Start: 2019-03-12 | End: 2019-03-16

## 2019-03-12 RX ORDER — AMPICILLIN SODIUM AND SULBACTAM SODIUM 250; 125 MG/ML; MG/ML
3 INJECTION, POWDER, FOR SUSPENSION INTRAMUSCULAR; INTRAVENOUS EVERY 6 HOURS
Qty: 0 | Refills: 0 | Status: DISCONTINUED | OUTPATIENT
Start: 2019-03-13 | End: 2019-03-16

## 2019-03-12 RX ORDER — HYDROMORPHONE HYDROCHLORIDE 2 MG/ML
0.5 INJECTION INTRAMUSCULAR; INTRAVENOUS; SUBCUTANEOUS ONCE
Qty: 0 | Refills: 0 | Status: DISCONTINUED | OUTPATIENT
Start: 2019-03-12 | End: 2019-03-12

## 2019-03-12 RX ORDER — VANCOMYCIN HCL 1 G
1000 VIAL (EA) INTRAVENOUS ONCE
Qty: 0 | Refills: 0 | Status: DISCONTINUED | OUTPATIENT
Start: 2019-03-12 | End: 2019-03-12

## 2019-03-12 RX ORDER — VANCOMYCIN HCL 1 G
VIAL (EA) INTRAVENOUS
Qty: 0 | Refills: 0 | Status: DISCONTINUED | OUTPATIENT
Start: 2019-03-12 | End: 2019-03-12

## 2019-03-12 RX ORDER — ACETAMINOPHEN 500 MG
1000 TABLET ORAL ONCE
Qty: 0 | Refills: 0 | Status: COMPLETED | OUTPATIENT
Start: 2019-03-12 | End: 2019-03-12

## 2019-03-12 RX ORDER — AMPICILLIN SODIUM AND SULBACTAM SODIUM 250; 125 MG/ML; MG/ML
3 INJECTION, POWDER, FOR SUSPENSION INTRAMUSCULAR; INTRAVENOUS ONCE
Qty: 0 | Refills: 0 | Status: COMPLETED | OUTPATIENT
Start: 2019-03-12 | End: 2019-03-12

## 2019-03-12 RX ORDER — PANTOPRAZOLE SODIUM 20 MG/1
40 TABLET, DELAYED RELEASE ORAL
Qty: 0 | Refills: 0 | Status: DISCONTINUED | OUTPATIENT
Start: 2019-03-12 | End: 2019-03-16

## 2019-03-12 RX ORDER — LANOLIN ALCOHOL/MO/W.PET/CERES
5 CREAM (GRAM) TOPICAL ONCE
Qty: 0 | Refills: 0 | Status: COMPLETED | OUTPATIENT
Start: 2019-03-12 | End: 2019-03-12

## 2019-03-12 RX ORDER — SODIUM CHLORIDE 9 MG/ML
3 INJECTION INTRAMUSCULAR; INTRAVENOUS; SUBCUTANEOUS EVERY 8 HOURS
Qty: 0 | Refills: 0 | Status: DISCONTINUED | OUTPATIENT
Start: 2019-03-12 | End: 2019-03-16

## 2019-03-12 RX ORDER — ESOMEPRAZOLE MAGNESIUM 40 MG/1
1 CAPSULE, DELAYED RELEASE ORAL
Qty: 0 | Refills: 0 | COMMUNITY

## 2019-03-12 RX ORDER — LANOLIN ALCOHOL/MO/W.PET/CERES
5 CREAM (GRAM) TOPICAL AT BEDTIME
Qty: 0 | Refills: 0 | Status: DISCONTINUED | OUTPATIENT
Start: 2019-03-12 | End: 2019-03-12

## 2019-03-12 RX ORDER — OXYCODONE HYDROCHLORIDE 5 MG/1
5 TABLET ORAL ONCE
Qty: 0 | Refills: 0 | Status: DISCONTINUED | OUTPATIENT
Start: 2019-03-12 | End: 2019-03-12

## 2019-03-12 RX ADMIN — Medication 102 MILLIGRAM(S): at 00:18

## 2019-03-12 RX ADMIN — Medication 5 MILLIGRAM(S): at 01:49

## 2019-03-12 RX ADMIN — MORPHINE SULFATE 4 MILLIGRAM(S): 50 CAPSULE, EXTENDED RELEASE ORAL at 15:56

## 2019-03-12 RX ADMIN — Medication 400 MILLIGRAM(S): at 03:07

## 2019-03-12 RX ADMIN — SODIUM CHLORIDE 3 MILLILITER(S): 9 INJECTION INTRAMUSCULAR; INTRAVENOUS; SUBCUTANEOUS at 06:00

## 2019-03-12 RX ADMIN — Medication 1000 MILLIGRAM(S): at 03:25

## 2019-03-12 RX ADMIN — SODIUM CHLORIDE 3 MILLILITER(S): 9 INJECTION INTRAMUSCULAR; INTRAVENOUS; SUBCUTANEOUS at 15:43

## 2019-03-12 RX ADMIN — SODIUM CHLORIDE 3 MILLILITER(S): 9 INJECTION INTRAMUSCULAR; INTRAVENOUS; SUBCUTANEOUS at 22:17

## 2019-03-12 RX ADMIN — MORPHINE SULFATE 4 MILLIGRAM(S): 50 CAPSULE, EXTENDED RELEASE ORAL at 17:00

## 2019-03-12 RX ADMIN — OXYCODONE HYDROCHLORIDE 5 MILLIGRAM(S): 5 TABLET ORAL at 06:35

## 2019-03-12 RX ADMIN — MORPHINE SULFATE 4 MILLIGRAM(S): 50 CAPSULE, EXTENDED RELEASE ORAL at 22:13

## 2019-03-12 RX ADMIN — SODIUM CHLORIDE 1000 MILLILITER(S): 9 INJECTION INTRAMUSCULAR; INTRAVENOUS; SUBCUTANEOUS at 00:32

## 2019-03-12 RX ADMIN — Medication 10 MILLIGRAM(S): at 00:50

## 2019-03-12 RX ADMIN — AMPICILLIN SODIUM AND SULBACTAM SODIUM 200 GRAM(S): 250; 125 INJECTION, POWDER, FOR SUSPENSION INTRAMUSCULAR; INTRAVENOUS at 15:56

## 2019-03-12 RX ADMIN — PANTOPRAZOLE SODIUM 40 MILLIGRAM(S): 20 TABLET, DELAYED RELEASE ORAL at 06:37

## 2019-03-12 RX ADMIN — MORPHINE SULFATE 4 MILLIGRAM(S): 50 CAPSULE, EXTENDED RELEASE ORAL at 23:00

## 2019-03-12 RX ADMIN — Medication 1000 MILLIGRAM(S): at 05:49

## 2019-03-12 RX ADMIN — AMPICILLIN SODIUM AND SULBACTAM SODIUM 200 GRAM(S): 250; 125 INJECTION, POWDER, FOR SUSPENSION INTRAMUSCULAR; INTRAVENOUS at 23:17

## 2019-03-12 NOTE — ED CDU PROVIDER INITIAL DAY NOTE - MEDICAL DECISION MAKING DETAILS
Bj: 58 year old male with difficulty swallowing x 1 week. CT neck shows assymetric soft tissue fullness in left posterior lateral hypopharyngeal wall.  ENT following patient. will c/w with pain control, ent following, GI to consult for post-procedure complication.

## 2019-03-12 NOTE — CONSULT NOTE ADULT - ASSESSMENT
58M s/p elective EGD 3/4/19 for Gill's esophagus complicated by post cricoid hematoma from traumatic intubation, returned 3/12/19 for concerning findings on outpatient ENT exam and continued neck pain. There is swelling to the left posterior lateral hypopharyngeal wall on CT with mild supraglottis erythema and minimal edema on laryngoscopy. He is afebrile and nontoxic, no leukocytosis. Low suspicion for infection.     Recommend  -can continue Unasyn for now, would not add Vancomycin     Discussed with ED team

## 2019-03-12 NOTE — H&P ADULT - HISTORY OF PRESENT ILLNESS
Patient is 57 y/o male PMHx MARÍA on CPAP, GERD, provoked DVT, Barretts esophagus in which patient had scheduled EGD w/ biopsy to evaluate Barretts on 3/4/19 which was complicated by postcricoid hematoma after intubation and was admitted one night for observation now presenting to the ED for left sided neck pain and difficulty swallowing for 1 week. Patient stated the left sided neck pain is 8/10 and has had no improvement with oxycodone/Tylenol. Patient has been eating yogurt and mashed potatoes and able to tolerate liquids with pain. Patient seen by ENT Dr. Soren Mukherjee who scoped patient and per wife "Dr. Mukherjee did not like what he saw on the scope". ENT directed patient to hospital for CT scan and IV abx.   In ED, patient had CT soft tissue Neck w/ IV contrast showing Asymmetric soft tissue fullness of the left posterior lateral hypopharyngeal wall, measuring up to 1.3 cm. No drainable collection. Bilateral thyroid nodules measuring up to 1.4 cm on the left, which are now well evaluated secondary to streak artifact. Patient evaluated by ENT, Fiberoptic Indirect laryngoscopy:  Clear nasopharynx to glottis, mild supraglottis erythema, minimal edema, tvc mobile b/l, airway patent.    pt. was coverted to admission for more prolonged IV abx and stabilization

## 2019-03-12 NOTE — ED ADULT NURSE REASSESSMENT NOTE - NS ED NURSE REASSESS COMMENT FT1
report taken from Cesia GUNN. states pt had good night with no complaints. Will continue to monitor.

## 2019-03-12 NOTE — H&P ADULT - NSICDXPASTMEDICALHX_GEN_ALL_CORE_FT
PAST MEDICAL HISTORY:  Gill esophagus     GERD (gastroesophageal reflux disease)     OA (osteoarthritis)     MARÍA on CPAP     Restless leg syndrome

## 2019-03-12 NOTE — H&P ADULT - NSHPPHYSICALEXAM_GEN_ALL_CORE
pt. seen and examined, NAD     Vital Signs Last 24 Hrs  T(C): 36.6 (12 Mar 2019 16:40), Max: 36.8 (12 Mar 2019 12:03)  T(F): 97.9 (12 Mar 2019 16:40), Max: 98.2 (12 Mar 2019 12:03)  HR: 76 (12 Mar 2019 16:40) (70 - 80)  BP: 118/69 (12 Mar 2019 16:40) (104/69 - 120/79)  BP(mean): --  RR: 16 (12 Mar 2019 16:40) (16 - 18)  SpO2: 95% (12 Mar 2019 16:40) (94% - 99%)    heent: nc/at, throat injected   neck: no LAD , supple  lungs: B/L clear, no w/r/r  heart: s1s2 nml  abd: soft, NABS, NT/ND  ext: no e/c/c  neuro: aaox3 , no focal deficit

## 2019-03-12 NOTE — CONSULT NOTE ADULT - SUBJECTIVE AND OBJECTIVE BOX
Anjelica Kothari  [id number]    Patient is a 58y old  Male who presents with a chief complaint of neck pain and swelling    HPI:  58M s/p elective EGD 3/4/19 for Gill's esophagus, developed odynophagia post procedure and found to have a post cricoid hematoma on ENT exam due to traumatic. He was monitored overnight and sent home but continued to have pain and on follow up with his ENT doctor he was sent back to the hospital because he "did not like what he saw".   CT with left posterior lateral hypopharyngeal wall swelling up to 1.3 cm. ENT laryngoscopy with mild supraglottis erythema and minimal edema.   ID consulted for possible infection.   He still has a lot of pain on swallowing and to touch on the left side of his neck. His teeth and gums are fine. No fevers or chills. No cough.     prior hospital charts reviewed [ x ]  primary team notes reviewed [x  ]  other consultant notes reviewed [ x ]    PAST MEDICAL & SURGICAL HISTORY:  Gill esophagus  Restless leg syndrome  MARÍA on CPAP  OA (osteoarthritis)  GERD (gastroesophageal reflux disease)  History of laminectomy    Allergies  No Known Allergies    ANTIMICROBIALS (past 90 days)  MEDICATIONS  (STANDING): ampicillin/sulbactam  IVPB 200 mL/Hr IV Intermittent (03-12-19 @ 15:56)    ANTIMICROBIALS:  ampicillin/sulbactam  IVPB      OTHER MEDS: MEDICATIONS  (STANDING):  morphine  - Injectable 4 every 6 hours PRN  pantoprazole    Tablet 40 two times a day      SOCIAL HISTORY: Former smoker. [place] for the [employer] .     FAMILY HISTORY:  Family history of cerebrovascular accident (CVA)    REVIEW OF SYSTEMS  [  ] ROS unobtainable because:    [ x ] All other systems negative except as noted below:	    Constitutional:  [ ] fever [ ] chills  [ ] weight loss  [ ] weakness  Skin:  [ ] rash [ ] phlebitis	  Eyes: [ ] icterus [ ] pain  [ ] discharge	  ENMT: [ x] sore throat  [ ] thrush [ ] ulcers [ ] exudates  Respiratory: [ ] dyspnea [ ] hemoptysis [ ] cough [ ] sputum	  Cardiovascular:  [ ] chest pain [ ] palpitations [ ] edema	  Gastrointestinal:  [ ] nausea [ ] vomiting [ ] diarrhea [ ] constipation [ ] pain	  Genitourinary:  [ ] dysuria [ ] frequency [ ] hematuria [ ] discharge [ ] flank pain  [ ] incontinence  Musculoskeletal:  [ ] myalgias [ ] arthralgias [ ] arthritis  [ ] back pain  Neurological:  [ ] headache [ ] seizures  [ ] confusion/altered mental status  Psychiatric:  [ ] anxiety [ ] depression	  Hematology/Lymphatics:  [ ] lymphadenopathy  Endocrine:  [ ] adrenal [ ] thyroid  Allergic/Immunologic:	 [ ] transplant [ ] seasonal    Vital Signs Last 24 Hrs  T(F): 97.9 (03-12-19 @ 16:40), Max: 98.2 (03-12-19 @ 12:03)    Vital Signs Last 24 Hrs  HR: 76 (03-12-19 @ 16:40) (70 - 97)  BP: 118/69 (03-12-19 @ 16:40) (104/69 - 137/96)  RR: 18 (03-12-19 @ 12:03)  SpO2: 95% (03-12-19 @ 16:40) (94% - 99%)  Wt(kg): --    PHYSICAL EXAM:  General: non-toxic, overweight, alert, oriented  HEAD/EYES: anicteric, PERRL  ENT:  oropharynx clear, fair dentition, tenderness to left neck/submandibular region   Cardiovascular:   regular rate and rhythm   Respiratory:  nonlabored on room air, clear bilaterally  GI:  soft, non-tender, normal bowel sounds  :  no suprapubic tenderness, no goldberg   Musculoskeletal:  no synovitis. normal rotation of the neck again resistance   Neurologic:  grossly non-focal  Skin:  blanching erythema over upper chest, no cellulitis   Lymph: no lymphadenopathy  Psychiatric:  appropriate affect  Vascular:  no phlebitis                        16.2   9.6   )-----------( 227      ( 11 Mar 2019 20:20 )             46.7     03-11    142  |  104  |  19  ----------------------------<  100<H>  4.3   |  23  |  0.91    Ca    10.1      11 Mar 2019 20:20    TPro  7.8  /  Alb  4.5  /  TBili  0.5  /  DBili  x   /  AST  20  /  ALT  34  /  AlkPhos  51  03-11          MICROBIOLOGY:  None    RADIOLOGY:  imaging below personally reviewed    CT Neck Soft Tissue w/ IV Cont (03.11.19 @ 21:19)   Asymmetric soft tissue fullness of the left posterior lateral   hypopharyngeal wall, measuring up to 1.3 cm. Underlying submucosal lesion   cannot be excluded and further evaluation with direct visualization is   recommended.  No drainable collection.  Bilateral thyroid nodules measuring up to 1.4 cm on the left, which are   now well evaluated secondary to streak artifact. Further evaluation with   nonemergent ultrasound is recommended.

## 2019-03-12 NOTE — ED CDU PROVIDER INITIAL DAY NOTE - OBJECTIVE STATEMENT
Patient is 57 y/o male PMHx MARÍA on CPAP, GERD, provoked DVT, Barretts esophagus in which patient had scheduled EGD w/ biopsy to evaluate Barretts on 3/4/19 which was complicated by postcricoid hematoma after intubation and was admitted one night for observation now presenting to the ED for left sided neck pain and difficulty swallowing for 1 week. Patient stated the left sided neck pain is 8/10 and has had no improvement with oxycodone/Tylenol. Patient has been eating yogurt and mashed potatoes and able to tolerate liquids with pain. Patient seen by ENT Dr. Soren Mukherjee who scoped patient and per wife "Dr. Mukherjee did not like what he saw on the scope". ENT directed patient to hospital for CT scan and IV abx.   In ED, patient had Asymmetric soft tissue fullness of the left posterior lateral hypopharyngeal wall, measuring up to 1.3 cm. No drainable collection. Bilateral thyroid nodules measuring up to 1.4 cm on the left, which are now well evaluated secondary to streak artifact. Patient evaluated by ENT, Fiberoptic Indirect laryngoscopy:  Clear nasopharynx to glottis, mild supraglottis erythema, minimal edema, tvc mobile b/l, airway patent. Patient given decadron 10mg IV x 1 and Diluadid, sent to CDU for frequent reeval, pain control, ENT re-evaluation post steroids, GI consult to r/o post procedure complication Patient is 57 y/o male PMHx MARÍA on CPAP, GERD, provoked DVT, Barretts esophagus in which patient had scheduled EGD w/ biopsy to evaluate Barretts on 3/4/19 which was complicated by postcricoid hematoma after intubation and was admitted one night for observation now presenting to the ED for left sided neck pain and difficulty swallowing for 1 week. Patient stated the left sided neck pain is 8/10 and has had no improvement with oxycodone/Tylenol. Patient has been eating yogurt and mashed potatoes and able to tolerate liquids with pain. Patient seen by ENT Dr. Soren Mukherjee who scoped patient and per wife "Dr. Mukherjee did not like what he saw on the scope". ENT directed patient to hospital for CT scan and IV abx.   In ED, patient had CT soft tissue Neck w/ IV contrast showing Asymmetric soft tissue fullness of the left posterior lateral hypopharyngeal wall, measuring up to 1.3 cm. No drainable collection. Bilateral thyroid nodules measuring up to 1.4 cm on the left, which are now well evaluated secondary to streak artifact. Patient evaluated by ENT, Fiberoptic Indirect laryngoscopy:  Clear nasopharynx to glottis, mild supraglottis erythema, minimal edema, tvc mobile b/l, airway patent. Patient given decadron 10mg IV x 1 and Diluadid, sent to CDU for frequent reeval, pain control, ENT re-evaluation post steroids, GI consult to r/o post procedure complication

## 2019-03-12 NOTE — ED CDU PROVIDER DISPOSITION NOTE - PLAN OF CARE
As recommended by ENT..........  As recommended by GI..............  Follow up with your Primary Care Physician within the next 2-3 days  Bring a copy of your test results with you to your appointment  Return to the Emergency Room if you experience new or worsening symptoms

## 2019-03-12 NOTE — H&P ADULT - NSHPLABSRESULTS_GEN_ALL_CORE
16.2   9.6   )-----------( 227      ( 11 Mar 2019 20:20 )             46.7     03-11    142  |  104  |  19  ----------------------------<  100<H>  4.3   |  23  |  0.91    Ca    10.1      11 Mar 2019 20:20    TPro  7.8  /  Alb  4.5  /  TBili  0.5  /  DBili  x   /  AST  20  /  ALT  34  /  AlkPhos  51  03-11

## 2019-03-12 NOTE — CONSULT NOTE ADULT - ASSESSMENT
Impression:  1) Neck pain with CT neck showing soft tissue fullness of hypopharyngeal wall likely secondary to post-traumatic pharyngeal erythema per ENT.   2) Postcricoid hematoma in the setting of intubation for EGD  3) Gill's esophagus with LGD s/p EGD with VLE/biopsy/Fox brush on 3/4.    Recommendations:  - management of neck CT findings per ENT  - no perforation or other GI abnormalities found on CT  - soft diet as tolerated  - pain control as needed  - outpatient follow-up with Dr. Stewart Impression:  1) Neck pain with CT neck showing soft tissue fullness of hypopharyngeal wall likely secondary to post-traumatic pharyngeal erythema per ENT.   2) Postcricoid hematoma in the setting of intubation for EGD  3) Gill's esophagus with LGD s/p EGD with VLE/biopsy/Fox brush on 3/4.    Recommendations:  - management of neck CT findings per ENT  - no perforation or other GI abnormalities found on CT  - soft diet as tolerated  - pain control as needed  - outpatient follow-up with Dr. Stewart - we will make him aware of events

## 2019-03-12 NOTE — ED ADULT NURSE REASSESSMENT NOTE - NS ED NURSE REASSESS COMMENT FT1
Report given to VELIA Vickers. A&O x 4. VSS as per flowsheet. Pt denies pain. Family at bedside. Safety maintained.

## 2019-03-12 NOTE — H&P ADULT - ASSESSMENT
A/P    Throat pain and throat infection :  -likely post traumatic during intubation last week   -seen by ENT  -s/p direct laryngeoscopy   -recommend PPI and IV abx   -seen by ID  -started on unasyn   -pain meds     MARÍA :  -stable   -c/w Bipap home settings     Barrets esophagus / GERD   -c/w PPI

## 2019-03-12 NOTE — ED CDU PROVIDER INITIAL DAY NOTE - PROGRESS NOTE DETAILS
CDU PROGRESS NOTE PA SILVER: Pt resting comfortably, NAD, Saturating 96% on RA. Patient c/o 7/10 pain to left side of neck, worst with swallowing. Posterior pharynx visualized, airway patent, Will order Ofirmev 1gm IVPB and continue to monitor. CDU PROGRESS NOTE PA SILVER: Pt resting comfortably, c/o 7/10 pain to left side of neck, No   Stridor / Drooling / Trismus.  Mucosa moist, tongue/uvula midline, oropharynx clear, airway patent, Will order 5mg Oxycodone PO and continue to monitor. Patient is tolerating PO. CDU NOTE EV Rodriguez: pt resting comfortably, pain controlled at this time. no complaints. NAD recent VSS. oxygenating well. ENT is to come back to scope pt this morning. will call GI for consult as recommended by ENT. CDU NOTE EV Rodriguez: pt seen by GI - cleared from GI standpoint. pt rescoped by ENT, L sided swelling noted, will discuss with their attending and call back. CDU NOTE EV Rodriguez: spoke to ENT PA- spoke directly to attending, pt to be admitted to medicine, placed on IV unasyn and will follow. no plan for surgery/procedure. CDU NOTE EV Rodriguez: spoke to Dr. Yuen- will admit to his service. requests ID consult.

## 2019-03-12 NOTE — ED CDU PROVIDER DISPOSITION NOTE - CLINICAL COURSE
Patient is 59 y/o male PMHx MARÍA on CPAP, GERD, provoked DVT, Barretts esophagus in which patient had scheduled EGD w/ biopsy to evaluate Barretts on 3/4/19 which was complicated by postcricoid hematoma after intubation and was admitted one night for observation now presenting to the ED for left sided neck pain and difficulty swallowing for 1 week. Patient stated the left sided neck pain is 8/10 and has had no improvement with oxycodone/Tylenol. Patient has been eating yogurt and mashed potatoes and able to tolerate liquids with pain. Patient seen by ENT Dr. Soren Mukherjee who scoped patient and per wife "Dr. Mukherjee did not like what he saw on the scope". ENT directed patient to hospital for CT scan and IV abx.   In ED, patient had Asymmetric soft tissue fullness of the left posterior lateral hypopharyngeal wall, measuring up to 1.3 cm. No drainable collection. Bilateral thyroid nodules measuring up to 1.4 cm on the left, which are now well evaluated secondary to streak artifact. Patient evaluated by ENT, Fiberoptic Indirect laryngoscopy:  Clear nasopharynx to glottis, mild supraglottis erythema, minimal edema, tvc mobile b/l, airway patent. Patient given decadron 10mg IV x 1 and Diluadid, sent to CDU for frequent reeval, pain control, ENT re-evaluation post steroids, GI consult to r/o post procedure complication Patient is 57 y/o male PMHx MARÍA on CPAP, GERD, provoked DVT, Barretts esophagus in which patient had scheduled EGD w/ biopsy to evaluate Barretts on 3/4/19 which was complicated by postcricoid hematoma after intubation and was admitted one night for observation now presenting to the ED for left sided neck pain and difficulty swallowing for 1 week. Patient stated the left sided neck pain is 8/10 and has had no improvement with oxycodone/Tylenol. Patient has been eating yogurt and mashed potatoes and able to tolerate liquids with pain. Patient seen by ENT Dr. Soren Mukherjee who scoped patient and per wife "Dr. Mukherjee did not like what he saw on the scope". ENT directed patient to hospital for CT scan and IV abx.   In ED, patient had CT soft tissue Neck w/ IV contrast showing Asymmetric soft tissue fullness of the left posterior lateral hypopharyngeal wall, measuring up to 1.3 cm. No drainable collection. Bilateral thyroid nodules measuring up to 1.4 cm on the left, which are now well evaluated secondary to streak artifact. Patient evaluated by ENT, Fiberoptic Indirect laryngoscopy:  Clear nasopharynx to glottis, mild supraglottis erythema, minimal edema, tvc mobile b/l, airway patent. Patient given decadron 10mg IV x 1 and Diluadid, sent to CDU for frequent reeval, pain control, ENT re-evaluation post steroids, GI consult to r/o post procedure complication Patient is 57 y/o male PMHx MARÍA on CPAP, GERD, provoked DVT, Barretts esophagus in which patient had scheduled EGD w/ biopsy to evaluate Barretts on 3/4/19 which was complicated by postcricoid hematoma after intubation and was admitted one night for observation now presenting to the ED for left sided neck pain and difficulty swallowing for 1 week. Patient stated the left sided neck pain is 8/10 and has had no improvement with oxycodone/Tylenol. Patient has been eating yogurt and mashed potatoes and able to tolerate liquids with pain. Patient seen by ENT Dr. Soren Mukherjee who scoped patient and per wife "Dr. Mukherjee did not like what he saw on the scope". ENT directed patient to hospital for CT scan and IV abx.   In ED, patient had CT soft tissue Neck w/ IV contrast showing Asymmetric soft tissue fullness of the left posterior lateral hypopharyngeal wall, measuring up to 1.3 cm. No drainable collection. Bilateral thyroid nodules measuring up to 1.4 cm on the left, which are now well evaluated secondary to streak artifact. Patient evaluated by ENT, Fiberoptic Indirect laryngoscopy:  Clear nasopharynx to glottis, mild supraglottis erythema, minimal edema, tvc mobile b/l, airway patent. Patient given decadron 10mg IV x 1 and Diluadid, sent to CDU for frequent reeval, pain control, ENT re-evaluation post steroids, GI consult to r/o post procedure complication  pt seen by GI- deferred to ENT. can f/up outpt from GI standpoint. pt seen by ENT- scoped in CDU, had L sided swelling. after further discussion with their attending, pt to be admitted to medicine for IV abx with their following. no plan for surgical/procedural intervention.

## 2019-03-12 NOTE — CONSULT NOTE ADULT - SUBJECTIVE AND OBJECTIVE BOX
GASTROENTEROLOGY INITIAL CONSULT NOTE    Chief Complaint:  Patient is a 58y old  Male who presents with a chief complaint of     HPI: 58y Male with past medical history Gill's esophagus with LGD s/p EGD with VLE/biopsy/Fox brush on 3/4. complicated by postcricoid hematoma in the setting of intubation who was sent in by ENT for further workup of left-sided neck pain. Since undergoing intubation/EGD, patient has had 8/10 left-sided neck pain that has no improved despite Tylenol and oxycodone. He has been able to tolerate soft foods like mashed potatoes and yogurt. He has not had nausea, vomiting, fevers, or chills.       Allergies:  No Known Allergies      Hospital Medications:  pantoprazole    Tablet 40 milliGRAM(s) Oral two times a day  sodium chloride 0.9% lock flush 3 milliLiter(s) IV Push every 8 hours      PMHX/PSHX:  Gill esophagus  Restless leg syndrome  MARÍA on CPAP  OA (osteoarthritis)  GERD (gastroesophageal reflux disease)  History of laminectomy      Family history:  Family history of cerebrovascular accident (CVA) (Father)      Social History:     ROS:     General:  No wt loss, fevers, chills, night sweats, fatigue,   Eyes:  Good vision, no reported pain  ENT:  No sore throat, pain, runny nose, dysphagia  CV:  No pain, palpitations, hypo/hypertension  Resp:  No dyspnea, cough, tachypnea, wheezing  GI:  see HPI  :  No pain, bleeding, incontinence, nocturia  Muscle:  No pain, weakness  Neuro:  No weakness, tingling, memory problems  Psych:  No fatigue, insomnia, mood problems, depression  Endocrine:  No polyuria, polydipsia, cold/heat intolerance  Heme:  No petechiae, ecchymosis, easy bruisability  Skin:  No rash, tattoos, scars, edema      PHYSICAL EXAM:   Vital Signs:  Vital Signs Last 24 Hrs  T(C): 36.3 (12 Mar 2019 07:54), Max: 36.7 (12 Mar 2019 00:36)  T(F): 97.3 (12 Mar 2019 07:54), Max: 98 (12 Mar 2019 00:36)  HR: 74 (12 Mar 2019 08:29) (70 - 97)  BP: 117/80 (12 Mar 2019 07:54) (104/69 - 137/96)  BP(mean): --  RR: 18 (12 Mar 2019 07:54) (18 - 18)  SpO2: 94% (12 Mar 2019 08:29) (94% - 96%)  Daily Height in cm: 177.8 (11 Mar 2019 18:06)    Daily     GENERAL:  no acute distress  HEENT:  -icterus   CHEST:  clear bilaterally, no wheezes or rales  HEART:  RRR, S1S2  ABDOMEN:  soft, non-tender, non-distended, normoactive bowel sounds,  no hepato-splenomegaly  EXTEREMITIES:  no  edema  SKIN:  No rash/erythema/ecchymoses/petechiae/wounds/abscess/warm/dry  NEURO:  alert, oriented, conversant     LABS:                        16.2   9.6   )-----------( 227      ( 11 Mar 2019 20:20 )             46.7     03-11    142  |  104  |  19  ----------------------------<  100<H>  4.3   |  23  |  0.91    Ca    10.1      11 Mar 2019 20:20    TPro  7.8  /  Alb  4.5  /  TBili  0.5  /  DBili  x   /  AST  20  /  ALT  34  /  AlkPhos  51  03-11    LIVER FUNCTIONS - ( 11 Mar 2019 20:20 )  Alb: 4.5 g/dL / Pro: 7.8 g/dL / ALK PHOS: 51 U/L / ALT: 34 U/L / AST: 20 U/L / GGT: x                 Imaging:  < from: Upper Endoscopy (03.04.19 @ 11:27) >    [facility]  ____________________________________________________________________________________________________  Patient Name: Kerwin Bauer                    MRN: [id number]  Account Number: [id number]                     YOB: 1960  Room: Endoscopy Room 2                           Gender: Male  Attending MD: Chandu Stewart MD            Procedure Date No Time: 3/4/2019  ____________________________________________________________________________________________________     Procedure:           Upper GI endoscopy  Indications:         58 year old male with Gill's with LGD. EGD scheduled for surveillance.  Providers:           Chandu Stewart MD, Rome Manuel MD (Fellow)  Medicines:   General Anesthesia  Complications:       No immediate complications.  ____________________________________________________________________________________________________  Procedure:           Pre-Anesthesia Assessment:                       - Therisks (bleeding, infection, perforation, anesthesia related risks, and                        pancreatitis if appropriate), benefits, and alternatives were discussed with                        the patient during the consent process. The patient agreed to proceed.                       After obtaining informed consent, the endoscope was passed under direct                        vision. Throughout the procedure, the patient's blood pressure, pulse, and                        oxygen saturations were monitored continuously. The was introduced through                        the mouth, and advanced to the second part of duodenum. The upper GI                        endoscopy was accomplished with ease.                                                            Findings:       EGD:       -The z-line was at 35 cm. The top of the gastric folds was at 37 cm. There was a short        segment of Gill's esophagus. The Gaastra class was C0M2.       -VLE was performed using a 20 mm balloon. The follow was laser marked and biopsied:  < from: CT Neck Soft Tissue w/ IV Cont (03.11.19 @ 21:19) >  IMPRESSION:    Asymmetric soft tissue fullness of the left posterior lateral   hypopharyngeal wall, measuring up to 1.3 cm. Underlying submucosal lesion   cannot be excluded and further evaluation with direct visualization is   recommended.    No drainable collection.    Bilateral thyroid nodules measuring up to 1.4 cm on the left, which are   now well evaluated secondary to streak artifact. Further evaluation with   nonemergent ultrasound is recommended.                      LIBBY ESTRADA M.D., Radiology Resident  This document has been electronically signed.  MATTHEW LEWIS M.D., ATTENDING RADIOLOGIST  This document has been electronically signed. Mar 11 2019 11:02PM    < end of copied text >       S1: distal esophagus/gastric cardia at 37 at 6OC: hyperreflective surface, atypical glands,        and effacement.       S2: distal esophagus/gastric cardia at 37 at 9OC: hyperreflective surface, atypical glands,        and effacement.       -WATS 3D biopsies were performed.       -[place] protocol biopsies every 1 cm were performed.       -The stomach was normal.       -The duodenal bulb and D2 were normal.                                                                                                  Impression:          EGD:                       -The z-line was at 35 cm. The top of the gastric folds was at 37 cm. There                        was a short segment of Gill's esophagus. The Gaastra class was C0M2.                       -VLE was performed using a 20 mm balloon. The follow was laser marked and                        biopsied:                       S1: distal esophagus/gastric cardia at 37 at 6OC: hyperreflective surface,                        atypical glands, and effacement.                       S2: distal esophagus/gastric cardia at 37 at 9OC: hyperreflective surface,                        atypical glands, and effacement.                   -WATS 3D biopsies were performed.                       -[place] protocol biopsies every 1 cm were performed.                       -The stomach was normal.                       -The duodenal bulb and D2 were normal.  Recommendation:      - Discharge patient to home (ambulatory).                       - Follow up biopsies.                       - Follow up WATS 3D                                                                                                          ______________________  Chandu Stewart MD  3/4/2019 12:37:06 PM  Number of Addenda: 0    Note Initiated On: 3/4/2019 11:27 AM    < end of copied text > GASTROENTEROLOGY INITIAL CONSULT NOTE    Chief Complaint:  Patient is a 58y old  Male who presents with a chief complaint of throat pain    HPI: 58y Male with past medical history Gill's esophagus with LGD s/p EGD with VLE/biopsy/Fox brush on 3/4. complicated by postcricoid hematoma in the setting of intubation who was sent in by ENT for further workup of left-sided neck pain. Since undergoing intubation/EGD, patient has had 8/10 left-sided neck pain that has no improved despite Tylenol and oxycodone. He has been able to tolerate soft foods like mashed potatoes and yogurt. He has not had nausea, vomiting, fevers, or chills.       Allergies:  No Known Allergies      Hospital Medications:  pantoprazole    Tablet 40 milliGRAM(s) Oral two times a day  sodium chloride 0.9% lock flush 3 milliLiter(s) IV Push every 8 hours      PMHX/PSHX:  Gill esophagus  Restless leg syndrome  MARÍA on CPAP  OA (osteoarthritis)  GERD (gastroesophageal reflux disease)  History of laminectomy      Family history:  Family history of cerebrovascular accident (CVA) (Father)      Social History: Former smoker, social ETOH, no illicit drugs    ROS:     General:  No wt loss, fevers, chills, night sweats, fatigue,   Eyes:  Good vision, no reported pain  ENT: Sore throat  CV:  No pain, palpitations, hypo/hypertension  Resp:  No dyspnea, cough, tachypnea, wheezing  GI:  see HPI  :  No pain, bleeding, incontinence, nocturia  Muscle:  No pain, weakness  Neuro:  No weakness, tingling, memory problems  Psych:  No fatigue, insomnia, mood problems, depression  Endocrine:  No polyuria, polydipsia, cold/heat intolerance  Heme:  No petechiae, ecchymosis, easy bruisability  Skin:  No rash, tattoos, scars, edema      PHYSICAL EXAM:   Vital Signs:  Vital Signs Last 24 Hrs  T(C): 36.3 (12 Mar 2019 07:54), Max: 36.7 (12 Mar 2019 00:36)  T(F): 97.3 (12 Mar 2019 07:54), Max: 98 (12 Mar 2019 00:36)  HR: 74 (12 Mar 2019 08:29) (70 - 97)  BP: 117/80 (12 Mar 2019 07:54) (104/69 - 137/96)  BP(mean): --  RR: 18 (12 Mar 2019 07:54) (18 - 18)  SpO2: 94% (12 Mar 2019 08:29) (94% - 96%)  Daily Height in cm: 177.8 (11 Mar 2019 18:06)    Daily     GENERAL:  no acute distress  HEENT:  -icterus; neck tender to palpation  CHEST:  clear bilaterally, no wheezes or rales  HEART:  RRR, S1S2  ABDOMEN:  soft, non-tender, non-distended, normoactive bowel sounds,  no hepato-splenomegaly  EXTEREMITIES:  no  edema  SKIN:  No rash/erythema/ecchymoses/petechiae/wounds/abscess/warm/dry  NEURO:  alert, oriented, conversant     LABS:                        16.2   9.6   )-----------( 227      ( 11 Mar 2019 20:20 )             46.7     03-11    142  |  104  |  19  ----------------------------<  100<H>  4.3   |  23  |  0.91    Ca    10.1      11 Mar 2019 20:20    TPro  7.8  /  Alb  4.5  /  TBili  0.5  /  DBili  x   /  AST  20  /  ALT  34  /  AlkPhos  51  03-11    LIVER FUNCTIONS - ( 11 Mar 2019 20:20 )  Alb: 4.5 g/dL / Pro: 7.8 g/dL / ALK PHOS: 51 U/L / ALT: 34 U/L / AST: 20 U/L / GGT: x                 Imaging:  < from: Upper Endoscopy (03.04.19 @ 11:27) >    [facility]  ____________________________________________________________________________________________________  Patient Name: Kerwin Bauer                    MRN: [id number]  Account Number: [id number]                     YOB: 1960  Room: Endoscopy Room 2                           Gender: Male  Attending MD: Chandu Stewart MD            Procedure Date No Time: 3/4/2019  ____________________________________________________________________________________________________     Procedure:           Upper GI endoscopy  Indications:         58 year old male with Gill's with LGD. EGD scheduled for surveillance.  Providers:           Chandu Stewart MD, Rome Manuel MD (Fellow)  Medicines:   General Anesthesia  Complications:       No immediate complications.  ____________________________________________________________________________________________________  Procedure:           Pre-Anesthesia Assessment:                       - Therisks (bleeding, infection, perforation, anesthesia related risks, and                        pancreatitis if appropriate), benefits, and alternatives were discussed with                        the patient during the consent process. The patient agreed to proceed.                       After obtaining informed consent, the endoscope was passed under direct                        vision. Throughout the procedure, the patient's blood pressure, pulse, and                        oxygen saturations were monitored continuously. The was introduced through                        the mouth, and advanced to the second part of duodenum. The upper GI                        endoscopy was accomplished with ease.                                                            Findings:       EGD:       -The z-line was at 35 cm. The top of the gastric folds was at 37 cm. There was a short        segment of Gill's esophagus. The [place] class was C0M2.       -VLE was performed using a 20 mm balloon. The follow was laser marked and biopsied:  < from: CT Neck Soft Tissue w/ IV Cont (03.11.19 @ 21:19) >  IMPRESSION:    Asymmetric soft tissue fullness of the left posterior lateral   hypopharyngeal wall, measuring up to 1.3 cm. Underlying submucosal lesion   cannot be excluded and further evaluation with direct visualization is   recommended.    No drainable collection.    Bilateral thyroid nodules measuring up to 1.4 cm on the left, which are   now well evaluated secondary to streak artifact. Further evaluation with   nonemergent ultrasound is recommended.                      LIBBY ESTRADA M.D., Radiology Resident  This document has been electronically signed.  MATTHEW LEWIS M.D., ATTENDING RADIOLOGIST  This document has been electronically signed. Mar 11 2019 11:02PM    < end of copied text >       S1: distal esophagus/gastric cardia at 37 at 6OC: hyperreflective surface, atypical glands,        and effacement.       S2: distal esophagus/gastric cardia at 37 at 9OC: hyperreflective surface, atypical glands,        and effacement.       -WATS 3D biopsies were performed.       -[place] protocol biopsies every 1 cm were performed.       -The stomach was normal.       -The duodenal bulb and D2 were normal.                                                                                                  Impression:          EGD:                       -The z-line was at 35 cm. The top of the gastric folds was at 37 cm. There                        was a short segment of Gill's esophagus. The [place] class was C0M2.                       -VLE was performed using a 20 mm balloon. The follow was laser marked and                        biopsied:                       S1: distal esophagus/gastric cardia at 37 at 6OC: hyperreflective surface,                        atypical glands, and effacement.                       S2: distal esophagus/gastric cardia at 37 at 9OC: hyperreflective surface,                        atypical glands, and effacement.                   -WATS 3D biopsies were performed.                       -[place] protocol biopsies every 1 cm were performed.                       -The stomach was normal.                       -The duodenal bulb and D2 were normal.  Recommendation:      - Discharge patient to home (ambulatory).                       - Follow up biopsies.                       - Follow up WATS 3D                                                                                                          ______________________  Chandu Stewart MD  3/4/2019 12:37:06 PM  Number of Addenda: 0    Note Initiated On: 3/4/2019 11:27 AM    < end of copied text >        < from: CT Neck Soft Tissue w/ IV Cont (03.11.19 @ 21:19) >  IMPRESSION:    Asymmetric soft tissue fullness of the left posterior lateral   hypopharyngeal wall, measuring up to 1.3 cm. Underlying submucosal lesion   cannot be excluded and further evaluation with direct visualization is   recommended.    No drainable collection.    Bilateral thyroid nodules measuring up to 1.4 cm on the left, which are   now well evaluated secondary to streak artifact. Further evaluation with   nonemergent ultrasound is recommended.    < end of copied text >

## 2019-03-12 NOTE — ED CDU PROVIDER DISPOSITION NOTE - ATTENDING CONTRIBUTION TO CARE
Agree with above, Hany Zavala MD, FACEP  Based on patient's history and physical exam, as well as the results of today's workup, I feel that patient warrants admission to the hospital for further workup/evaluation and continued management. I discussed the findings of today's workup with the patient and addressed the patient's questions and concerns. The patient was agreeable with admission. Our team spoke with the medicine team hospitalist who accepted the patient for admission and subsequently took over the patient's care.

## 2019-03-12 NOTE — ED CDU PROVIDER INITIAL DAY NOTE - PHYSICAL EXAMINATION
No Stridor / Drooling / Trismus.  Mucosa moist, tongue/uvula midline, oropharynx clear  Left neck tender to palpation, minimal edema, no erythema.  supple, no lymphadenopathy, trachea midline, no masses

## 2019-03-13 PROCEDURE — 99232 SBSQ HOSP IP/OBS MODERATE 35: CPT

## 2019-03-13 RX ORDER — ACETAMINOPHEN 500 MG
1000 TABLET ORAL ONCE
Qty: 0 | Refills: 0 | Status: COMPLETED | OUTPATIENT
Start: 2019-03-13 | End: 2019-03-13

## 2019-03-13 RX ADMIN — MORPHINE SULFATE 4 MILLIGRAM(S): 50 CAPSULE, EXTENDED RELEASE ORAL at 05:21

## 2019-03-13 RX ADMIN — Medication 400 MILLIGRAM(S): at 09:32

## 2019-03-13 RX ADMIN — SODIUM CHLORIDE 3 MILLILITER(S): 9 INJECTION INTRAMUSCULAR; INTRAVENOUS; SUBCUTANEOUS at 11:55

## 2019-03-13 RX ADMIN — PANTOPRAZOLE SODIUM 40 MILLIGRAM(S): 20 TABLET, DELAYED RELEASE ORAL at 17:04

## 2019-03-13 RX ADMIN — MORPHINE SULFATE 4 MILLIGRAM(S): 50 CAPSULE, EXTENDED RELEASE ORAL at 12:25

## 2019-03-13 RX ADMIN — PANTOPRAZOLE SODIUM 40 MILLIGRAM(S): 20 TABLET, DELAYED RELEASE ORAL at 05:23

## 2019-03-13 RX ADMIN — MORPHINE SULFATE 4 MILLIGRAM(S): 50 CAPSULE, EXTENDED RELEASE ORAL at 18:37

## 2019-03-13 RX ADMIN — MORPHINE SULFATE 4 MILLIGRAM(S): 50 CAPSULE, EXTENDED RELEASE ORAL at 19:15

## 2019-03-13 RX ADMIN — SODIUM CHLORIDE 3 MILLILITER(S): 9 INJECTION INTRAMUSCULAR; INTRAVENOUS; SUBCUTANEOUS at 21:04

## 2019-03-13 RX ADMIN — AMPICILLIN SODIUM AND SULBACTAM SODIUM 200 GRAM(S): 250; 125 INJECTION, POWDER, FOR SUSPENSION INTRAMUSCULAR; INTRAVENOUS at 11:55

## 2019-03-13 RX ADMIN — AMPICILLIN SODIUM AND SULBACTAM SODIUM 200 GRAM(S): 250; 125 INJECTION, POWDER, FOR SUSPENSION INTRAMUSCULAR; INTRAVENOUS at 17:04

## 2019-03-13 RX ADMIN — AMPICILLIN SODIUM AND SULBACTAM SODIUM 200 GRAM(S): 250; 125 INJECTION, POWDER, FOR SUSPENSION INTRAMUSCULAR; INTRAVENOUS at 05:22

## 2019-03-13 RX ADMIN — Medication 1000 MILLIGRAM(S): at 10:05

## 2019-03-13 RX ADMIN — MORPHINE SULFATE 4 MILLIGRAM(S): 50 CAPSULE, EXTENDED RELEASE ORAL at 04:17

## 2019-03-13 RX ADMIN — MORPHINE SULFATE 4 MILLIGRAM(S): 50 CAPSULE, EXTENDED RELEASE ORAL at 11:54

## 2019-03-13 RX ADMIN — SODIUM CHLORIDE 3 MILLILITER(S): 9 INJECTION INTRAMUSCULAR; INTRAVENOUS; SUBCUTANEOUS at 05:38

## 2019-03-13 NOTE — PROGRESS NOTE ADULT - SUBJECTIVE AND OBJECTIVE BOX
Patient is a 58y old  Male who presents with a chief complaint of throat pain / dysphagia (13 Mar 2019 11:34)    pt. jimmy nd examined, doing fair, still throat pain , better then yesterday     INTERVAL HPI/OVERNIGHT EVENTS:  T(C): 36.4 (03-13-19 @ 14:50), Max: 36.4 (03-12-19 @ 20:34)  HR: 66 (03-13-19 @ 14:50) (66 - 76)  BP: 132/88 (03-13-19 @ 14:50) (114/76 - 137/91)  RR: 18 (03-13-19 @ 14:50) (16 - 18)  SpO2: 96% (03-13-19 @ 14:50) (95% - 98%)  Wt(kg): --  I&O's Summary    13 Mar 2019 07:01  -  13 Mar 2019 17:59  --------------------------------------------------------  IN: 200 mL / OUT: 0 mL / NET: 200 mL        PAST MEDICAL & SURGICAL HISTORY:  Gill esophagus  Restless leg syndrome  MARÍA on CPAP  OA (osteoarthritis)  GERD (gastroesophageal reflux disease)  History of laminectomy      SOCIAL HISTORY  Alcohol:  Tobacco:  Illicit substance use:    FAMILY HISTORY:    REVIEW OF SYSTEMS:  CONSTITUTIONAL: No fever, weight loss, or fatigue  EYES: No eye pain, visual disturbances, or discharge  ENMT:  No difficulty hearing, tinnitus, vertigo; No sinus or throat pain  NECK: No pain or stiffness  RESPIRATORY: No cough, wheezing, chills or hemoptysis; No shortness of breath  CARDIOVASCULAR: No chest pain, palpitations, dizziness, or leg swelling  GASTROINTESTINAL: No abdominal or epigastric pain. No nausea, vomiting, or hematemesis; No diarrhea or constipation. No melena or hematochezia.  GENITOURINARY: No dysuria, frequency, hematuria, or incontinence  NEUROLOGICAL: No headaches, memory loss, loss of strength, numbness, or tremors  SKIN: No itching, burning, rashes, or lesions   LYMPH NODES: No enlarged glands  ENDOCRINE: No heat or cold intolerance; No hair loss  MUSCULOSKELETAL: No joint pain or swelling; No muscle, back, or extremity pain  PSYCHIATRIC: No depression, anxiety, mood swings, or difficulty sleeping  HEME/LYMPH: No easy bruising, or bleeding gums  ALLERY AND IMMUNOLOGIC: No hives or eczema    RADIOLOGY & ADDITIONAL TESTS:    Imaging Personally Reviewed:  [ ] YES  [ ] NO    Consultant(s) Notes Reviewed:  [ ] YES  [ ] NO    PHYSICAL EXAM:  GENERAL: NAD, well-groomed, well-developed  HEAD:  Atraumatic, Normocephalic  EYES: EOMI, PERRLA, conjunctiva and sclera clear  ENMT: No tonsillar erythema, exudates, or enlargement; Moist mucous membranes, Good dentition, No lesions  NECK: Supple, No JVD, Normal thyroid  NERVOUS SYSTEM:  Alert & Oriented X3, Good concentration; Motor Strength 5/5 B/L upper and lower extremities; DTRs 2+ intact and symmetric  CHEST/LUNG: Clear to percussion bilaterally; No rales, rhonchi, wheezing, or rubs  HEART: Regular rate and rhythm; No murmurs, rubs, or gallops  ABDOMEN: Soft, Nontender, Nondistended; Bowel sounds present  EXTREMITIES:  2+ Peripheral Pulses, No clubbing, cyanosis, or edema  LYMPH: No lymphadenopathy noted  SKIN: No rashes or lesions    LABS:                        16.2   9.6   )-----------( 227      ( 11 Mar 2019 20:20 )             46.7     03-11    142  |  104  |  19  ----------------------------<  100<H>  4.3   |  23  |  0.91    Ca    10.1      11 Mar 2019 20:20    TPro  7.8  /  Alb  4.5  /  TBili  0.5  /  DBili  x   /  AST  20  /  ALT  34  /  AlkPhos  51  03-11        CAPILLARY BLOOD GLUCOSE                MEDICATIONS  (STANDING):  ampicillin/sulbactam  IVPB      ampicillin/sulbactam  IVPB 3 Gram(s) IV Intermittent every 6 hours  pantoprazole    Tablet 40 milliGRAM(s) Oral two times a day  sodium chloride 0.9% lock flush 3 milliLiter(s) IV Push every 8 hours    MEDICATIONS  (PRN):  morphine  - Injectable 4 milliGRAM(s) IV Push every 6 hours PRN Severe Pain (7 - 10)      Care Discussed with Consultants/Other Providers [ ] YES  [ ] NO

## 2019-03-13 NOTE — PROGRESS NOTE ADULT - ASSESSMENT
58M s/p elective EGD 3/4/19 for Gill's esophagus complicated by post cricoid hematoma from traumatic intubation, returned 3/12/19 for concerning findings on outpatient ENT exam and continued neck pain. There is swelling to the left posterior lateral hypopharyngeal wall on CT with mild supraglottis erythema and minimal edema on laryngoscopy. He is afebrile and nontoxic, no leukocytosis. No abscess seen on CT. pharyngeal inflammation/swelling.  Short course of antibiotics seems reasonable as he has responded symptomatically    Recommend  - can transition to oral augmentin to complete 5-7 days if okay with ENT today

## 2019-03-13 NOTE — PROGRESS NOTE ADULT - ASSESSMENT
A/P    Throat pain and throat infection :  -likely post traumatic during intubation last week   -seen by ENT  -s/p direct laryngeoscopy   -recommend PPI and IV abx   -seen by ID  -started on unasyn   -pain meds     MARÍA :  -stable     Barrets esophagus / GERD   -c/w PPI

## 2019-03-13 NOTE — PROGRESS NOTE ADULT - SUBJECTIVE AND OBJECTIVE BOX
Anjelica Kothari - [phone]    Patient is a 58y old  Male who presents with a chief complaint of neck pain and swelling    Interval History/ROS:  no fever/chills. throat pain is better.  no n/v/d.  no abdominal pain.  no dysuria.  Remainder of ROS otherwise negative.    PAST MEDICAL & SURGICAL HISTORY:  Gill esophagus  Restless leg syndrome  MARÍA on CPAP  OA (osteoarthritis)  GERD (gastroesophageal reflux disease)  History of laminectomy    Allergies  No Known Allergies    ANTIMICROBIALS:    ampicillin/sulbactam  IVPB 3 every 6 hours (3/12-)    MEDICATIONS  (STANDING):  pantoprazole    Tablet 40 two times a day    Vital Signs Last 24 Hrs  T(F): 97.5 (03-13-19 @ 09:56), Max: 98.2 (03-12-19 @ 12:03)  HR: 71 (03-13-19 @ 09:56)  BP: 120/83 (03-13-19 @ 09:56)  RR: 18 (03-13-19 @ 09:56)  SpO2: 97% (03-13-19 @ 09:56) (95% - 99%)    PHYSICAL EXAM:  General: non-toxic  HEAD/EYES: anicteric  ENT:  supple; much less tender; no fluctuance  Cardiovascular:   S1, S2  Respiratory:  clear bilaterally  GI:  soft, non-tender, normal bowel sounds  :  no goldberg  Musculoskeletal:  no synovitis  Neurologic:  grossly non-focal  Skin:  no rash  Psychiatric:  appropriate affect  Vascular:  no phlebitis                        16.2   9.6   )-----------( 227      ( 11 Mar 2019 20:20 )             46.7 03-11    142  |  104  |  19  ----------------------------<  100  4.3   |  23  |  0.91  Ca    10.1      11 Mar 2019 20:20  TPro  7.8  /  Alb  4.5  /  TBili  0.5  /  DBili  x   /  AST  20  /  ALT  34  /  AlkPhos  51  03-11    MICROBIOLOGY:  None    RADIOLOGY:  imaging below personally reviewed    CT Neck Soft Tissue w/ IV Cont (03.11.19 @ 21:19)   Asymmetric soft tissue fullness of the left posterior lateral hypopharyngeal wall, measuring up to 1.3 cm. Underlying submucosal lesion cannot be excluded and further evaluation with direct visualization is   recommended.  No drainable collection.  Bilateral thyroid nodules measuring up to 1.4 cm on the left, which are now well evaluated secondary to streak artifact. Further evaluation with nonemergent ultrasound is recommended.

## 2019-03-14 ENCOUNTER — CHART COPY (OUTPATIENT)
Age: 59
End: 2019-03-14

## 2019-03-14 ENCOUNTER — TRANSCRIPTION ENCOUNTER (OUTPATIENT)
Age: 59
End: 2019-03-14

## 2019-03-14 DIAGNOSIS — R22.1 LOCALIZED SWELLING, MASS AND LUMP, NECK: ICD-10-CM

## 2019-03-14 DIAGNOSIS — R07.0 PAIN IN THROAT: ICD-10-CM

## 2019-03-14 PROCEDURE — 99232 SBSQ HOSP IP/OBS MODERATE 35: CPT

## 2019-03-14 RX ORDER — OXYCODONE AND ACETAMINOPHEN 5; 325 MG/1; MG/1
1 TABLET ORAL EVERY 6 HOURS
Qty: 0 | Refills: 0 | Status: DISCONTINUED | OUTPATIENT
Start: 2019-03-14 | End: 2019-03-16

## 2019-03-14 RX ORDER — MORPHINE SULFATE 50 MG/1
4 CAPSULE, EXTENDED RELEASE ORAL EVERY 4 HOURS
Qty: 0 | Refills: 0 | Status: DISCONTINUED | OUTPATIENT
Start: 2019-03-14 | End: 2019-03-14

## 2019-03-14 RX ORDER — MORPHINE SULFATE 50 MG/1
2 CAPSULE, EXTENDED RELEASE ORAL EVERY 4 HOURS
Qty: 0 | Refills: 0 | Status: DISCONTINUED | OUTPATIENT
Start: 2019-03-14 | End: 2019-03-16

## 2019-03-14 RX ADMIN — SODIUM CHLORIDE 3 MILLILITER(S): 9 INJECTION INTRAMUSCULAR; INTRAVENOUS; SUBCUTANEOUS at 21:16

## 2019-03-14 RX ADMIN — PANTOPRAZOLE SODIUM 40 MILLIGRAM(S): 20 TABLET, DELAYED RELEASE ORAL at 17:29

## 2019-03-14 RX ADMIN — MORPHINE SULFATE 4 MILLIGRAM(S): 50 CAPSULE, EXTENDED RELEASE ORAL at 01:00

## 2019-03-14 RX ADMIN — OXYCODONE AND ACETAMINOPHEN 1 TABLET(S): 5; 325 TABLET ORAL at 21:58

## 2019-03-14 RX ADMIN — AMPICILLIN SODIUM AND SULBACTAM SODIUM 200 GRAM(S): 250; 125 INJECTION, POWDER, FOR SUSPENSION INTRAMUSCULAR; INTRAVENOUS at 06:48

## 2019-03-14 RX ADMIN — SODIUM CHLORIDE 3 MILLILITER(S): 9 INJECTION INTRAMUSCULAR; INTRAVENOUS; SUBCUTANEOUS at 12:23

## 2019-03-14 RX ADMIN — OXYCODONE AND ACETAMINOPHEN 1 TABLET(S): 5; 325 TABLET ORAL at 16:00

## 2019-03-14 RX ADMIN — MORPHINE SULFATE 4 MILLIGRAM(S): 50 CAPSULE, EXTENDED RELEASE ORAL at 12:10

## 2019-03-14 RX ADMIN — AMPICILLIN SODIUM AND SULBACTAM SODIUM 200 GRAM(S): 250; 125 INJECTION, POWDER, FOR SUSPENSION INTRAMUSCULAR; INTRAVENOUS at 12:10

## 2019-03-14 RX ADMIN — OXYCODONE AND ACETAMINOPHEN 1 TABLET(S): 5; 325 TABLET ORAL at 21:28

## 2019-03-14 RX ADMIN — SODIUM CHLORIDE 3 MILLILITER(S): 9 INJECTION INTRAMUSCULAR; INTRAVENOUS; SUBCUTANEOUS at 06:48

## 2019-03-14 RX ADMIN — AMPICILLIN SODIUM AND SULBACTAM SODIUM 200 GRAM(S): 250; 125 INJECTION, POWDER, FOR SUSPENSION INTRAMUSCULAR; INTRAVENOUS at 17:29

## 2019-03-14 RX ADMIN — AMPICILLIN SODIUM AND SULBACTAM SODIUM 200 GRAM(S): 250; 125 INJECTION, POWDER, FOR SUSPENSION INTRAMUSCULAR; INTRAVENOUS at 00:45

## 2019-03-14 RX ADMIN — MORPHINE SULFATE 4 MILLIGRAM(S): 50 CAPSULE, EXTENDED RELEASE ORAL at 12:47

## 2019-03-14 RX ADMIN — PANTOPRAZOLE SODIUM 40 MILLIGRAM(S): 20 TABLET, DELAYED RELEASE ORAL at 06:48

## 2019-03-14 RX ADMIN — MORPHINE SULFATE 4 MILLIGRAM(S): 50 CAPSULE, EXTENDED RELEASE ORAL at 00:45

## 2019-03-14 RX ADMIN — MORPHINE SULFATE 4 MILLIGRAM(S): 50 CAPSULE, EXTENDED RELEASE ORAL at 06:48

## 2019-03-14 RX ADMIN — MORPHINE SULFATE 4 MILLIGRAM(S): 50 CAPSULE, EXTENDED RELEASE ORAL at 07:05

## 2019-03-14 RX ADMIN — OXYCODONE AND ACETAMINOPHEN 1 TABLET(S): 5; 325 TABLET ORAL at 15:22

## 2019-03-14 NOTE — PROGRESS NOTE ADULT - ASSESSMENT
52 yo male s/p traumatic intubation w sore throat currently being tx'd w IV unasyn and dilaudid for pain control. Pt tolerating soft diet 52 yo male s/p traumatic intubation w sore throat currently being tx'd w IV unasyn and dilaudid for pain control. Pt tolerating soft diet. Repeat laryngoscopy revealing diffuse erythema and irritation, resolving left post cricoid hematoma. Airway widely patent, no edema. Pt stable on room air

## 2019-03-14 NOTE — DISCHARGE NOTE PROVIDER - HOSPITAL COURSE
Patient is 59 y/o male PMHx MARÍA on CPAP, GERD, provoked DVT, Barretts esophagus in which ptatient had scheduled EGD w/ biopsy to evaluate Barretts on 3/4/19 which was complicated by postcricoid hematoma after intubation and was admitted one night for observation now presenting to the ED for left sided neck pain and difficulty swallowing for 1 week. s/p ENT Dr. Yaz peters  and scope as outpatietn, sent in to hospital for CT scan and IV antibiotic.. s/p CT soft tissue Neck w/ IV contrast showing Asymmetric soft tissue fullness of the left posterior lateral hypopharyngeal wall, measuring up to 1.3 cm. No drainable collection. Bilateral thyroid nodules measuring up to 1.4 cm on the left, which are now well evaluated secondary to streak artifact. Evaluated by ENT and s/p Fibreoptic Indirect laryngoscopy- Clear nasopharynx to glottis, mild supraglottis erythema, minimal edema, tvc mobile b/l, airway patent. PPI and IV Antibiotic Unasyn given. Evaluated by ID.    s/p repeat Laryngoscopy by ENT at bedside, revealing diffuse erythema and irritation, resolving left post cricoid hematoma. Airway widely patent, no edema. ENT recommend to swithch to oral ABX Augmentin 875 BID at least 1 week, pain control and soft diet             52 yo male s/p traumatic intubation w sore throat currently being tx'd w IV unasyn and dilaudid for pain control. Pt tolerating soft diet. Repeat laryngoscopy r. Pt stable on room air         Problem Selector:    PROBLEM DIAGNOSES    Problem: Throat pain    Assessment and Plan: Pt may be switched to oral abx,     Pain control prn    Soft diet. Patient is 59 y/o male PMHx MARÍA on CPAP, GERD, provoked DVT, Barretts esophagus in which ptatient had scheduled EGD w/ biopsy to evaluate Barretts on 3/4/19 which was complicated by postcricoid hematoma after intubation and was admitted one night for observation now presenting to the ED for left sided neck pain and difficulty swallowing for 1 week. s/p ENT Dr. Yaz peters  and scope as outpatietn, sent in to hospital for CT scan and IV antibiotic.. s/p CT soft tissue Neck w/ IV contrast showing Asymmetric soft tissue fullness of the left posterior lateral hypopharyngeal wall, measuring up to 1.3 cm. No drainable collection. Bilateral thyroid nodules measuring up to 1.4 cm on the left, which are now well evaluated secondary to streak artifact. Evaluated by ENT and s/p Fibreoptic Indirect laryngoscopy- Clear nasopharynx to glottis, mild supraglottis erythema, minimal edema, tvc mobile b/l, airway patent. PPI and IV Antibiotic Unasyn given. Evaluated by ID.    s/p repeat Laryngoscopy by ENT at bedside, revealing diffuse erythema and irritation, resolving left post cricoid hematoma. Airway widely patent, no edema. ENT recommend to swithch to oral ABX Augmentin 875 BID at least 1 week, pain control and soft diet             54 yo male s/p traumatic intubation w sore throat currently being tx'd w IV unasyn and dilaudid for pain control. Pt tolerating soft diet. Repeat laryngoscopy r. Pt stable on room air         Problem Selector:    PROBLEM DIAGNOSES    Problem: Throat pain    Assessment and Plan: Pt may be switched to oral abx,     Pain control prn    Soft diet.    due to acute supraglotitis, received iv ABs, now on iv Unasyn and switched to oral Augmentin x 5 days, cont percocet 1 tab every 6 hrs as needed x 20 tab , follow up with ENT next week and PCP IN 1-2 weeks, cont nexium as before, soft diet and advance as tolerated.    Spoke to Attending, ok to discharge him home today.

## 2019-03-14 NOTE — DISCHARGE NOTE PROVIDER - NSDCCPCAREPLAN_GEN_ALL_CORE_FT
PRINCIPAL DISCHARGE DIAGNOSIS  Diagnosis: Throat pain  Assessment and Plan of Treatment:       SECONDARY DISCHARGE DIAGNOSES  Diagnosis: Gill esophagus  Assessment and Plan of Treatment: Continue  with PPI  Follow up with your gastreenterologist    Diagnosis: Thyroid nodule  Assessment and Plan of Treatment: Incidental findings of thyroid nodue on CT  Nonemergent US recommended  Please follow up with your Primdary care physician    Diagnosis: Acute pharyngitis  Assessment and Plan of Treatment: Please complete the course of antibiotic as directed  Follow up wiht Your ENT doctor    Diagnosis: Throat swelling  Assessment and Plan of Treatment: PRINCIPAL DISCHARGE DIAGNOSIS  Diagnosis: Throat pain  Assessment and Plan of Treatment: due to acute supraglotitis, received iv ABs, now on iv Unasyn and switched to oral Augmentin x 5 days, cont percocet 1 tab every 6 hrs as needed x 20 tab , follow up with ENT next week and PCP IN 1-2 weeks, cont nexium as before, soft diet and advance as tolerated.      SECONDARY DISCHARGE DIAGNOSES  Diagnosis: Gill esophagus  Assessment and Plan of Treatment: Continue  with PPI  Follow up with your gastreenterologist    Diagnosis: Thyroid nodule  Assessment and Plan of Treatment: Incidental findings of thyroid nodue on CT  Nonemergent US recommended  Please follow up with your Primdary care physician    Diagnosis: Acute pharyngitis  Assessment and Plan of Treatment: Please complete the course of antibiotic as directed  Follow up wiht Your ENT doctor    Diagnosis: Throat swelling  Assessment and Plan of Treatment:

## 2019-03-14 NOTE — PROGRESS NOTE ADULT - NSICDXPROBLEM_GEN_ALL_CORE_FT
PROBLEM DIAGNOSES  Problem: Throat pain  Assessment and Plan: Pt may be switched to oral abx, Augmentin 875 BID at least 1 week   Pain control prn  Soft diet PROBLEM DIAGNOSES  Problem: Throat pain  Assessment and Plan: Recommend keeping pt aty least one more day on IV abx  Cont soft diet  Pain control prn

## 2019-03-14 NOTE — PROGRESS NOTE ADULT - SUBJECTIVE AND OBJECTIVE BOX
Anjelica Kothari - [phone]    Patient is a 58y old  Male who presents with a chief complaint of neck pain and swelling    Interval History/ROS:  no fever. right throat with more pain today.  no n/v/d.  no abdominal pain.  no dysuria.  Remainder of ROS otherwise negative.    PAST MEDICAL & SURGICAL HISTORY:  Gill esophagus  Restless leg syndrome  MARÍA on CPAP  OA (osteoarthritis)  GERD (gastroesophageal reflux disease)  History of laminectomy    Allergies  No Known Allergies    ANTIMICROBIALS:    ampicillin/sulbactam  IVPB 3 every 6 hours (3/12-)    MEDICATIONS  (STANDING):  pantoprazole    Tablet 40 two times a day    Vital Signs Last 24 Hrs  T(F): 98.1 (03-14-19 @ 12:30), Max: 98.3 (03-13-19 @ 20:47)  HR: 63 (03-14-19 @ 12:30)  BP: 105/62 (03-14-19 @ 12:30)  RR: 18 (03-14-19 @ 12:30)  SpO2: 96% (03-14-19 @ 12:30) (95% - 97%)  Wt(kg): --    PHYSICAL EXAM:  General: non-toxic  HEAD/EYES: anicteric  ENT:  supple; slight tender R neck; no fluctuance  Cardiovascular:   S1, S2  Respiratory:  clear bilaterally  GI:  soft, non-tender, normal bowel sounds  :  no goldberg  Musculoskeletal:  no synovitis  Neurologic:  grossly non-focal  Skin:  no rash  Psychiatric:  appropriate affect  Vascular:  no phlebitis    no new labs    MICROBIOLOGY:  n/a    RADIOLOGY:  imaging below personally reviewed    CT Neck Soft Tissue w/ IV Cont (03.11.19 @ 21:19)   Asymmetric soft tissue fullness of the left posterior lateral hypopharyngeal wall, measuring up to 1.3 cm. Underlying submucosal lesion cannot be excluded and further evaluation with direct visualization is   recommended.  No drainable collection.  Bilateral thyroid nodules measuring up to 1.4 cm on the left, which are now well evaluated secondary to streak artifact. Further evaluation with nonemergent ultrasound is recommended.

## 2019-03-14 NOTE — PROGRESS NOTE ADULT - SUBJECTIVE AND OBJECTIVE BOX
Patient is a 58y old  Male who presents with a chief complaint of throat pain / dysphagia (14 Mar 2019 13:26)    pt. seen and examined, feeling better  INTERVAL HPI/OVERNIGHT EVENTS:  T(C): 36.4 (03-15-19 @ 00:26), Max: 36.8 (03-14-19 @ 20:59)  HR: 71 (03-15-19 @ 00:26) (60 - 89)  BP: 120/73 (03-15-19 @ 00:26) (104/69 - 134/91)  RR: 18 (03-15-19 @ 00:26) (18 - 18)  SpO2: 95% (03-15-19 @ 00:26) (95% - 96%)  Wt(kg): --  I&O's Summary    13 Mar 2019 07:01  -  14 Mar 2019 07:00  --------------------------------------------------------  IN: 460 mL / OUT: 0 mL / NET: 460 mL    14 Mar 2019 07:01  -  15 Mar 2019 00:41  --------------------------------------------------------  IN: 240 mL / OUT: 0 mL / NET: 240 mL        PAST MEDICAL & SURGICAL HISTORY:  Gill esophagus  Restless leg syndrome  MARÍA on CPAP  OA (osteoarthritis)  GERD (gastroesophageal reflux disease)  History of laminectomy      SOCIAL HISTORY  Alcohol:  Tobacco:  Illicit substance use:    FAMILY HISTORY:    REVIEW OF SYSTEMS:  CONSTITUTIONAL: No fever, weight loss, or fatigue  EYES: No eye pain, visual disturbances, or discharge  ENMT:  No difficulty hearing, tinnitus, vertigo; No sinus or throat pain  NECK: No pain or stiffness  RESPIRATORY: No cough, wheezing, chills or hemoptysis; No shortness of breath  CARDIOVASCULAR: No chest pain, palpitations, dizziness, or leg swelling  GASTROINTESTINAL: No abdominal or epigastric pain. No nausea, vomiting, or hematemesis; No diarrhea or constipation. No melena or hematochezia.  GENITOURINARY: No dysuria, frequency, hematuria, or incontinence  NEUROLOGICAL: No headaches, memory loss, loss of strength, numbness, or tremors  SKIN: No itching, burning, rashes, or lesions   LYMPH NODES: No enlarged glands  ENDOCRINE: No heat or cold intolerance; No hair loss  MUSCULOSKELETAL: No joint pain or swelling; No muscle, back, or extremity pain  PSYCHIATRIC: No depression, anxiety, mood swings, or difficulty sleeping  HEME/LYMPH: No easy bruising, or bleeding gums  ALLERY AND IMMUNOLOGIC: No hives or eczema    RADIOLOGY & ADDITIONAL TESTS:    Imaging Personally Reviewed:  [ ] YES  [ ] NO    Consultant(s) Notes Reviewed:  [ ] YES  [ ] NO    PHYSICAL EXAM:  GENERAL: NAD, well-groomed, well-developed  HEAD:  Atraumatic, Normocephalic  EYES: EOMI, PERRLA, conjunctiva and sclera clear  ENMT: No tonsillar erythema, exudates, or enlargement; Moist mucous membranes, Good dentition, No lesions  NECK: Supple, No JVD, Normal thyroid  NERVOUS SYSTEM:  Alert & Oriented X3, Good concentration; Motor Strength 5/5 B/L upper and lower extremities; DTRs 2+ intact and symmetric  CHEST/LUNG: Clear to percussion bilaterally; No rales, rhonchi, wheezing, or rubs  HEART: Regular rate and rhythm; No murmurs, rubs, or gallops  ABDOMEN: Soft, Nontender, Nondistended; Bowel sounds present  EXTREMITIES:  2+ Peripheral Pulses, No clubbing, cyanosis, or edema  LYMPH: No lymphadenopathy noted  SKIN: No rashes or lesions    LABS:              CAPILLARY BLOOD GLUCOSE                MEDICATIONS  (STANDING):  ampicillin/sulbactam  IVPB      ampicillin/sulbactam  IVPB 3 Gram(s) IV Intermittent every 6 hours  pantoprazole    Tablet 40 milliGRAM(s) Oral two times a day  sodium chloride 0.9% lock flush 3 milliLiter(s) IV Push every 8 hours    MEDICATIONS  (PRN):  morphine  - Injectable 2 milliGRAM(s) IV Push every 4 hours PRN Severe Pain (7 - 10)  oxyCODONE    5 mG/acetaminophen 325 mG 1 Tablet(s) Oral every 6 hours PRN Moderate Pain (4 - 6)      Care Discussed with Consultants/Other Providers [ ] YES  [ ] NO

## 2019-03-14 NOTE — PROGRESS NOTE ADULT - ASSESSMENT
58M s/p elective EGD 3/4/19 for Gill's esophagus complicated by post cricoid hematoma from traumatic intubation, returned 3/12/19 for concerning findings on outpatient ENT exam and continued neck pain. There is swelling to the left posterior lateral hypopharyngeal wall on CT with mild supraglottis erythema and minimal edema on laryngoscopy. He is afebrile and nontoxic, no leukocytosis. No abscess seen on CT. pharyngeal inflammation/swelling.  Was responding to antibiotics, however, today a little more pain on the R side.  ENT appreciated    Recommend  - to continue IV unasyn today  - possible transition to oral augmentin tomorrow    above d/w NP

## 2019-03-14 NOTE — DISCHARGE NOTE PROVIDER - CARE PROVIDER_API CALL
Soren Dallas)  Otolaryngology  [address]  [place] [address]  Phone: [phone]  Fax: [phone]  Follow Up Time:     Brandon Camacho)  [facility]  [address] [place] [address]  Phone: [phone]  Fax: [phone]  Follow Up Time:

## 2019-03-14 NOTE — PROGRESS NOTE ADULT - ASSESSMENT
A/P    Throat pain and throat infection :  -likely post traumatic during intubation last week   -seen by ENT  -s/p direct laryngeoscopy , repeated today   -recommend PPI and IV abx   -seen by ID  -started on unasyn   -pain meds     MARÍA :  -stable     Barrets esophagus / GERD   -c/w PPI

## 2019-03-14 NOTE — PROGRESS NOTE ADULT - SUBJECTIVE AND OBJECTIVE BOX
ENT ISSUE/POD: throat pain    HPI: 57 yo male with throat pain s/p traumatic intubation being treated with IV unasyn and dilaudid for pain control. Pt reports improvement in throat pain. Pt tolerating soft diet without any difficulties.         PAST MEDICAL & SURGICAL HISTORY:  Gill esophagus  Restless leg syndrome  AMRÍA on CPAP  OA (osteoarthritis)  GERD (gastroesophageal reflux disease)  History of laminectomy    Allergies    No Known Allergies    Intolerances      MEDICATIONS  (STANDING):  ampicillin/sulbactam  IVPB      ampicillin/sulbactam  IVPB 3 Gram(s) IV Intermittent every 6 hours  pantoprazole    Tablet 40 milliGRAM(s) Oral two times a day  sodium chloride 0.9% lock flush 3 milliLiter(s) IV Push every 8 hours    MEDICATIONS  (PRN):  morphine  - Injectable 4 milliGRAM(s) IV Push every 6 hours PRN Severe Pain (7 - 10)      ROS:   ENT: all negative except as noted in HPI   Pulm: denies SOB, cough, hemoptysis  Neuro: denies numbness/tingling, loss of sensation  Endo: denies heat/cold intolerance, excessive sweating      Vital Signs Last 24 Hrs  T(C): 36.3 (14 Mar 2019 05:45), Max: 36.8 (13 Mar 2019 20:47)  T(F): 97.3 (14 Mar 2019 05:45), Max: 98.3 (13 Mar 2019 20:47)  HR: 67 (14 Mar 2019 05:45) (66 - 84)  BP: 115/74 (14 Mar 2019 05:45) (104/66 - 132/88)  BP(mean): --  RR: 18 (14 Mar 2019 05:45) (18 - 18)  SpO2: 96% (14 Mar 2019 05:45) (95% - 98%)              PHYSICAL EXAM:  Gen: NAD  Skin: No rashes, bruises, or lesions  Head: Normocephalic, Atraumatic  Face: no edema, erythema, or fluctuance. Parotid glands soft without mass  Eyes: no scleral injection  Nose: Nares bilaterally patent, no discharge  Mouth: No Stridor / Drooling / Trismus.  Mucosa moist, tongue/uvula midline, oropharynx clear  Neck: Flat, supple, no lymphadenopathy, trachea midline, no masses  Lymphatic: No lymphadenopathy  Resp: breathing easily, no stridor  Neuro: facial nerve intact, no facial droop ENT ISSUE/POD: throat pain    HPI: 59 yo male with throat pain s/p traumatic intubation being treated with IV unasyn and dilaudid for pain control. Pt reports improvement in throat pain. Pt tolerating soft diet without any difficulties. Notes the pain is intermittent and has now developed on the right side in addition to the left         PAST MEDICAL & SURGICAL HISTORY:  Gill esophagus  Restless leg syndrome  MARÍA on CPAP  OA (osteoarthritis)  GERD (gastroesophageal reflux disease)  History of laminectomy    Allergies    No Known Allergies    Intolerances      MEDICATIONS  (STANDING):  ampicillin/sulbactam  IVPB      ampicillin/sulbactam  IVPB 3 Gram(s) IV Intermittent every 6 hours  pantoprazole    Tablet 40 milliGRAM(s) Oral two times a day  sodium chloride 0.9% lock flush 3 milliLiter(s) IV Push every 8 hours    MEDICATIONS  (PRN):  morphine  - Injectable 4 milliGRAM(s) IV Push every 6 hours PRN Severe Pain (7 - 10)      ROS:   ENT: all negative except as noted in HPI   Pulm: denies SOB, cough, hemoptysis  Neuro: denies numbness/tingling, loss of sensation  Endo: denies heat/cold intolerance, excessive sweating      Vital Signs Last 24 Hrs  T(C): 36.3 (14 Mar 2019 05:45), Max: 36.8 (13 Mar 2019 20:47)  T(F): 97.3 (14 Mar 2019 05:45), Max: 98.3 (13 Mar 2019 20:47)  HR: 67 (14 Mar 2019 05:45) (66 - 84)  BP: 115/74 (14 Mar 2019 05:45) (104/66 - 132/88)  BP(mean): --  RR: 18 (14 Mar 2019 05:45) (18 - 18)  SpO2: 96% (14 Mar 2019 05:45) (95% - 98%)              PHYSICAL EXAM:  Gen: NAD  Skin: No rashes, bruises, or lesions  Head: Normocephalic, Atraumatic  Face: no edema, erythema, or fluctuance. Parotid glands soft without mass  Eyes: no scleral injection  Nose: Nares bilaterally patent, no discharge  Mouth: No Stridor / Drooling / Trismus.  Mucosa moist, tongue/uvula midline, oropharynx clear  Neck: Flat, supple, no lymphadenopathy, trachea midline, no masses  Lymphatic: No lymphadenopathy  Resp: breathing easily, no stridor  Neuro: facial nerve intact, no facial droop      Laryngoscopy:  Reason for Laryngoscopy: Throat pain	    Patient was unable to cooperate with mirror.  Diffuse erythema and irritation Tongue base, posterior pharyngeal wall, vallecula, epiglottis, and subglottis appear normal. No erythema, edema, pooling of secretions, masses or lesions. Airway patent, no foreign body visualized. No glottic/supraglottic edema. True vocal cords, arytenoids, vestibular folds, ventricles, pyriform sinuses, and aryepiglottic folds appear normal bilaterally. Vocal cords mobile with good contact b/l. ENT ISSUE/POD: throat pain    HPI: 59 yo male with throat pain s/p traumatic intubation being treated with IV unasyn and dilaudid for pain control. Pt reports improvement in throat pain. Pt tolerating soft diet without any difficulties. Notes the pain is intermittent and has now developed on the right side in addition to the left         PAST MEDICAL & SURGICAL HISTORY:  Gill esophagus  Restless leg syndrome  MARÍA on CPAP  OA (osteoarthritis)  GERD (gastroesophageal reflux disease)  History of laminectomy    Allergies    No Known Allergies    Intolerances      MEDICATIONS  (STANDING):  ampicillin/sulbactam  IVPB      ampicillin/sulbactam  IVPB 3 Gram(s) IV Intermittent every 6 hours  pantoprazole    Tablet 40 milliGRAM(s) Oral two times a day  sodium chloride 0.9% lock flush 3 milliLiter(s) IV Push every 8 hours    MEDICATIONS  (PRN):  morphine  - Injectable 4 milliGRAM(s) IV Push every 6 hours PRN Severe Pain (7 - 10)      ROS:   ENT: all negative except as noted in HPI   Pulm: denies SOB, cough, hemoptysis  Neuro: denies numbness/tingling, loss of sensation  Endo: denies heat/cold intolerance, excessive sweating      Vital Signs Last 24 Hrs  T(C): 36.3 (14 Mar 2019 05:45), Max: 36.8 (13 Mar 2019 20:47)  T(F): 97.3 (14 Mar 2019 05:45), Max: 98.3 (13 Mar 2019 20:47)  HR: 67 (14 Mar 2019 05:45) (66 - 84)  BP: 115/74 (14 Mar 2019 05:45) (104/66 - 132/88)  BP(mean): --  RR: 18 (14 Mar 2019 05:45) (18 - 18)  SpO2: 96% (14 Mar 2019 05:45) (95% - 98%)              PHYSICAL EXAM:  Gen: NAD  Skin: No rashes, bruises, or lesions  Head: Normocephalic, Atraumatic  Face: no edema, erythema, or fluctuance. Parotid glands soft without mass  Eyes: no scleral injection  Nose: Nares bilaterally patent, no discharge  Mouth: No Stridor / Drooling / Trismus.  Mucosa moist, tongue/uvula midline, oropharynx clear  Neck: Flat, supple, no lymphadenopathy, trachea midline, no masses  Lymphatic: No lymphadenopathy  Resp: breathing easily, no stridor  Neuro: facial nerve intact, no facial droop      Laryngoscopy:  Reason for Laryngoscopy: Throat pain	    Patient was unable to cooperate with mirror.  Diffuse erythema and irritation starting at the nasopharynx extending into the glottis. Tongue base, posterior pharyngeal wall, vallecula, epiglottis, and subglottis without edema, No pooling of secretions, masses or lesions. Airway patent, no foreign body visualized. True vocal cords, arytenoids, vestibular folds, ventricles, pyriform sinuses, and aryepiglottic folds appear normal bilaterally. Vocal cords mobile with good contact b/l. +Resolving left post cricoid hematoma.

## 2019-03-15 PROCEDURE — 99232 SBSQ HOSP IP/OBS MODERATE 35: CPT

## 2019-03-15 RX ADMIN — MORPHINE SULFATE 2 MILLIGRAM(S): 50 CAPSULE, EXTENDED RELEASE ORAL at 23:42

## 2019-03-15 RX ADMIN — PANTOPRAZOLE SODIUM 40 MILLIGRAM(S): 20 TABLET, DELAYED RELEASE ORAL at 05:52

## 2019-03-15 RX ADMIN — MORPHINE SULFATE 2 MILLIGRAM(S): 50 CAPSULE, EXTENDED RELEASE ORAL at 15:25

## 2019-03-15 RX ADMIN — MORPHINE SULFATE 2 MILLIGRAM(S): 50 CAPSULE, EXTENDED RELEASE ORAL at 15:40

## 2019-03-15 RX ADMIN — AMPICILLIN SODIUM AND SULBACTAM SODIUM 200 GRAM(S): 250; 125 INJECTION, POWDER, FOR SUSPENSION INTRAMUSCULAR; INTRAVENOUS at 00:15

## 2019-03-15 RX ADMIN — AMPICILLIN SODIUM AND SULBACTAM SODIUM 200 GRAM(S): 250; 125 INJECTION, POWDER, FOR SUSPENSION INTRAMUSCULAR; INTRAVENOUS at 23:45

## 2019-03-15 RX ADMIN — PANTOPRAZOLE SODIUM 40 MILLIGRAM(S): 20 TABLET, DELAYED RELEASE ORAL at 17:29

## 2019-03-15 RX ADMIN — MORPHINE SULFATE 2 MILLIGRAM(S): 50 CAPSULE, EXTENDED RELEASE ORAL at 09:00

## 2019-03-15 RX ADMIN — OXYCODONE AND ACETAMINOPHEN 1 TABLET(S): 5; 325 TABLET ORAL at 13:05

## 2019-03-15 RX ADMIN — SODIUM CHLORIDE 3 MILLILITER(S): 9 INJECTION INTRAMUSCULAR; INTRAVENOUS; SUBCUTANEOUS at 14:47

## 2019-03-15 RX ADMIN — AMPICILLIN SODIUM AND SULBACTAM SODIUM 200 GRAM(S): 250; 125 INJECTION, POWDER, FOR SUSPENSION INTRAMUSCULAR; INTRAVENOUS at 17:29

## 2019-03-15 RX ADMIN — AMPICILLIN SODIUM AND SULBACTAM SODIUM 200 GRAM(S): 250; 125 INJECTION, POWDER, FOR SUSPENSION INTRAMUSCULAR; INTRAVENOUS at 05:52

## 2019-03-15 RX ADMIN — AMPICILLIN SODIUM AND SULBACTAM SODIUM 200 GRAM(S): 250; 125 INJECTION, POWDER, FOR SUSPENSION INTRAMUSCULAR; INTRAVENOUS at 11:44

## 2019-03-15 RX ADMIN — SODIUM CHLORIDE 3 MILLILITER(S): 9 INJECTION INTRAMUSCULAR; INTRAVENOUS; SUBCUTANEOUS at 05:20

## 2019-03-15 RX ADMIN — OXYCODONE AND ACETAMINOPHEN 1 TABLET(S): 5; 325 TABLET ORAL at 20:15

## 2019-03-15 RX ADMIN — OXYCODONE AND ACETAMINOPHEN 1 TABLET(S): 5; 325 TABLET ORAL at 20:45

## 2019-03-15 RX ADMIN — SODIUM CHLORIDE 3 MILLILITER(S): 9 INJECTION INTRAMUSCULAR; INTRAVENOUS; SUBCUTANEOUS at 21:32

## 2019-03-15 RX ADMIN — MORPHINE SULFATE 2 MILLIGRAM(S): 50 CAPSULE, EXTENDED RELEASE ORAL at 09:15

## 2019-03-15 RX ADMIN — OXYCODONE AND ACETAMINOPHEN 1 TABLET(S): 5; 325 TABLET ORAL at 12:34

## 2019-03-15 RX ADMIN — OXYCODONE AND ACETAMINOPHEN 1 TABLET(S): 5; 325 TABLET ORAL at 05:52

## 2019-03-15 RX ADMIN — MORPHINE SULFATE 2 MILLIGRAM(S): 50 CAPSULE, EXTENDED RELEASE ORAL at 00:18

## 2019-03-15 RX ADMIN — MORPHINE SULFATE 2 MILLIGRAM(S): 50 CAPSULE, EXTENDED RELEASE ORAL at 00:48

## 2019-03-15 NOTE — PROGRESS NOTE ADULT - NSICDXPROBLEM_GEN_ALL_CORE_FT
PROBLEM DIAGNOSES  Problem: Throat pain  Assessment and Plan: Continue pt on IV abx  Cont soft diet  Pain control prn.   Continue PPI treatment.  CBC prior to discharge.  Cleared for discharge today from ENT standpoint.

## 2019-03-15 NOTE — PROGRESS NOTE ADULT - SUBJECTIVE AND OBJECTIVE BOX
Patient is a 58y old  Male who presents with a chief complaint of throat pain / dysphagia (15 Mar 2019 13:18)    pt. seen and examined, doing better     INTERVAL HPI/OVERNIGHT EVENTS:  T(C): 36.8 (03-16-19 @ 00:05), Max: 36.8 (03-15-19 @ 14:56)  HR: 79 (03-16-19 @ 00:05) (59 - 89)  BP: 115/76 (03-16-19 @ 00:05) (99/66 - 134/95)  RR: 18 (03-16-19 @ 00:05) (18 - 18)  SpO2: 93% (03-16-19 @ 00:05) (93% - 97%)  Wt(kg): --  I&O's Summary    14 Mar 2019 07:01  -  15 Mar 2019 07:00  --------------------------------------------------------  IN: 240 mL / OUT: 0 mL / NET: 240 mL        PAST MEDICAL & SURGICAL HISTORY:  Gill esophagus  Restless leg syndrome  MARÍA on CPAP  OA (osteoarthritis)  GERD (gastroesophageal reflux disease)  History of laminectomy      SOCIAL HISTORY  Alcohol:  Tobacco:  Illicit substance use:    FAMILY HISTORY:    REVIEW OF SYSTEMS:  CONSTITUTIONAL: No fever, weight loss, or fatigue  EYES: No eye pain, visual disturbances, or discharge  ENMT:  No difficulty hearing, tinnitus, vertigo; No sinus or throat pain  NECK: No pain or stiffness  RESPIRATORY: No cough, wheezing, chills or hemoptysis; No shortness of breath  CARDIOVASCULAR: No chest pain, palpitations, dizziness, or leg swelling  GASTROINTESTINAL: No abdominal or epigastric pain. No nausea, vomiting, or hematemesis; No diarrhea or constipation. No melena or hematochezia.  GENITOURINARY: No dysuria, frequency, hematuria, or incontinence  NEUROLOGICAL: No headaches, memory loss, loss of strength, numbness, or tremors  SKIN: No itching, burning, rashes, or lesions   LYMPH NODES: No enlarged glands  ENDOCRINE: No heat or cold intolerance; No hair loss  MUSCULOSKELETAL: No joint pain or swelling; No muscle, back, or extremity pain  PSYCHIATRIC: No depression, anxiety, mood swings, or difficulty sleeping  HEME/LYMPH: No easy bruising, or bleeding gums  ALLERY AND IMMUNOLOGIC: No hives or eczema    RADIOLOGY & ADDITIONAL TESTS:    Imaging Personally Reviewed:  [ ] YES  [ ] NO    Consultant(s) Notes Reviewed:  [ ] YES  [ ] NO    PHYSICAL EXAM:  GENERAL: NAD, well-groomed, well-developed  HEAD:  Atraumatic, Normocephalic  EYES: EOMI, PERRLA, conjunctiva and sclera clear  ENMT: No tonsillar erythema, exudates, or enlargement; Moist mucous membranes, Good dentition, No lesions  NECK: Supple, No JVD, Normal thyroid  NERVOUS SYSTEM:  Alert & Oriented X3, Good concentration; Motor Strength 5/5 B/L upper and lower extremities; DTRs 2+ intact and symmetric  CHEST/LUNG: Clear to percussion bilaterally; No rales, rhonchi, wheezing, or rubs  HEART: Regular rate and rhythm; No murmurs, rubs, or gallops  ABDOMEN: Soft, Nontender, Nondistended; Bowel sounds present  EXTREMITIES:  2+ Peripheral Pulses, No clubbing, cyanosis, or edema  LYMPH: No lymphadenopathy noted  SKIN: No rashes or lesions    LABS:              CAPILLARY BLOOD GLUCOSE                MEDICATIONS  (STANDING):  ampicillin/sulbactam  IVPB      ampicillin/sulbactam  IVPB 3 Gram(s) IV Intermittent every 6 hours  pantoprazole    Tablet 40 milliGRAM(s) Oral two times a day  sodium chloride 0.9% lock flush 3 milliLiter(s) IV Push every 8 hours    MEDICATIONS  (PRN):  morphine  - Injectable 2 milliGRAM(s) IV Push every 4 hours PRN Severe Pain (7 - 10)  oxyCODONE    5 mG/acetaminophen 325 mG 1 Tablet(s) Oral every 6 hours PRN Moderate Pain (4 - 6)      Care Discussed with Consultants/Other Providers [ ] YES  [ ] NO

## 2019-03-15 NOTE — PROGRESS NOTE ADULT - REASON FOR ADMISSION
throat pain / dysphagia

## 2019-03-15 NOTE — PROGRESS NOTE ADULT - ATTENDING COMMENTS
change abx to PO, plan for d/c home in am
clinically better, plan for switching meds PO and d/c home in am
d/w spouse regarding plan of care

## 2019-03-15 NOTE — PROGRESS NOTE ADULT - ASSESSMENT
58M s/p elective EGD 3/4/19 for Gill's esophagus complicated by post cricoid hematoma from traumatic intubation, returned 3/12/19 for concerning findings on outpatient ENT exam and continued neck pain. There is swelling to the left posterior lateral hypopharyngeal wall on CT with mild supraglottis erythema and minimal edema on laryngoscopy. He is afebrile and nontoxic, no leukocytosis. No abscess seen on CT. pharyngeal inflammation/swelling.  Was responding to antibiotics, however, today a little more pain on the R side.  ENT appreciated    Recommend  - ok from ID standpoint to transition to oral augmentin tomorrow 58M s/p elective EGD 3/4/19 for Gill's esophagus complicated by post cricoid hematoma from traumatic intubation, returned 3/12/19 for concerning findings on outpatient ENT exam and continued neck pain. There is swelling to the left posterior lateral hypopharyngeal wall on CT with mild supraglottis erythema and minimal edema on laryngoscopy. He is afebrile and nontoxic, no leukocytosis. No abscess seen on CT. pharyngeal inflammation/swelling.  Was responding to antibiotics, however, today a little more pain on the R side.  ENT appreciated    Recommend  - ok from ID standpoint to transition to oral augmentin tomorrow      above d/w NP

## 2019-03-15 NOTE — PROGRESS NOTE ADULT - PROVIDER SPECIALTY LIST ADULT
ENT
ENT
Infectious Disease
Internal Medicine

## 2019-03-15 NOTE — PROGRESS NOTE ADULT - SUBJECTIVE AND OBJECTIVE BOX
ENT ISSUE/POD:    HPI:  Patient is 57 y/o male with neck and throat pain post EGD. The EGD was complicated by postcricoid hematoma after intubation and was admitted for IV antibiotic and stabilization. Patient states his neck pain is decreased and now rates it a 5/10. He has the pain with swallowing and is able to tolerate a soft diet with improvement in his voice. Pt denies fever, n/v, chills, HA, SOB, hemoptysis, unintentional weight loss.        PAST MEDICAL & SURGICAL HISTORY:  Gill esophagus  Restless leg syndrome  MARÍA on CPAP  OA (osteoarthritis)  GERD (gastroesophageal reflux disease)  History of laminectomy    Allergies    No Known Allergies    Intolerances      MEDICATIONS  (STANDING):  ampicillin/sulbactam  IVPB      ampicillin/sulbactam  IVPB 3 Gram(s) IV Intermittent every 6 hours  pantoprazole    Tablet 40 milliGRAM(s) Oral two times a day  sodium chloride 0.9% lock flush 3 milliLiter(s) IV Push every 8 hours    MEDICATIONS  (PRN):  morphine  - Injectable 2 milliGRAM(s) IV Push every 4 hours PRN Severe Pain (7 - 10)  oxyCODONE    5 mG/acetaminophen 325 mG 1 Tablet(s) Oral every 6 hours PRN Moderate Pain (4 - 6)      Social History: see consult note    Family history: see consult note    ROS:   ENT: all negative except as noted in HPI   Pulm: denies SOB, cough, hemoptysis  Neuro: denies numbness/tingling, loss of sensation  Endo: denies heat/cold intolerance, excessive sweating      Vital Signs Last 24 Hrs  T(C): 36.4 (15 Mar 2019 04:55), Max: 36.8 (14 Mar 2019 20:59)  T(F): 97.5 (15 Mar 2019 04:55), Max: 98.2 (14 Mar 2019 20:59)  HR: 62 (15 Mar 2019 06:27) (59 - 89)  BP: 99/66 (15 Mar 2019 04:55) (99/66 - 134/91)  BP(mean): --  RR: 18 (15 Mar 2019 04:55) (18 - 18)  SpO2: 96% (15 Mar 2019 06:27) (94% - 96%)         PHYSICAL EXAM:  Gen: NAD  Skin: No rashes, bruises, or lesions  Head: Normocephalic, Atraumatic  Face: no edema, erythema, or fluctuance. Parotid glands soft without mass  Eyes: no scleral injection  Nose: Nares bilaterally patent, no discharge  Mouth: No Stridor / Drooling / Trismus.  Mucosa moist, tongue/uvula midline, large tongue, oropharynx clear  Neck: Flat, supple, no lymphadenopathy, pain with postauricular and preauricular palpation. trachea midline, no masses  Lymphatic: No lymphadenopathy  Resp: breathing easily, no stridor  Neuro: facial nerve intact, no facial droop

## 2019-03-15 NOTE — PROGRESS NOTE ADULT - ASSESSMENT
54 yo male s/p traumatic intubation w sore throat currently being tx'd w IV unasyn and dilaudid for pain control. Pt tolerating soft diet. Airway widely patent, no edema. Pt stable on room air

## 2019-03-15 NOTE — PROGRESS NOTE ADULT - SUBJECTIVE AND OBJECTIVE BOX
Anjelica Kothari - [phone]    Patient is a 58y old  Male who presents with a chief complaint of neck pain and swelling    Interval History/ROS:  no fever. throat is better today.  ENT PA appreciated.  eating soft foods - no problem.  no abdominal pain.  no dysuria.  Remainder of ROS otherwise negative.    PAST MEDICAL & SURGICAL HISTORY:  Gill esophagus  Restless leg syndrome  MARÍA on CPAP  OA (osteoarthritis)  GERD (gastroesophageal reflux disease)  History of laminectomy    Allergies  No Known Allergies    ANTIMICROBIALS:    ampicillin/sulbactam  IVPB 3 every 6 hours (3/12-)    MEDICATIONS  (STANDING):  pantoprazole    Tablet 40 two times a day    Vital Signs Last 24 Hrs  T(F): 97.6 (03-15-19 @ 11:00), Max: 98.2 (03-14-19 @ 20:59)  HR: 75 (03-15-19 @ 11:00)  BP: 129/81 (03-15-19 @ 11:00)  RR: 18 (03-15-19 @ 11:00)  SpO2: 97% (03-15-19 @ 11:00) (94% - 97%)  Wt(kg): --    PHYSICAL EXAM:  General: non-toxic  HEAD/EYES: anicteric  ENT:  supple; slight tender R neck; no fluctuance  Cardiovascular:   S1, S2  Respiratory:  clear bilaterally  GI:  soft, non-tender, normal bowel sounds  :  no goldberg  Musculoskeletal:  no synovitis  Neurologic:  grossly non-focal  Skin:  no rash  Psychiatric:  appropriate affect  Vascular:  no phlebitis    no new labs    MICROBIOLOGY:  n/a    RADIOLOGY:  imaging below personally reviewed    CT Neck Soft Tissue w/ IV Cont (03.11.19 @ 21:19)   Asymmetric soft tissue fullness of the left posterior lateral hypopharyngeal wall, measuring up to 1.3 cm. Underlying submucosal lesion cannot be excluded and further evaluation with direct visualization is   recommended.  No drainable collection.  Bilateral thyroid nodules measuring up to 1.4 cm on the left, which are now well evaluated secondary to streak artifact. Further evaluation with nonemergent ultrasound is recommended.

## 2019-03-16 ENCOUNTER — TRANSCRIPTION ENCOUNTER (OUTPATIENT)
Age: 59
End: 2019-03-16

## 2019-03-16 VITALS
SYSTOLIC BLOOD PRESSURE: 157 MMHG | OXYGEN SATURATION: 97 % | TEMPERATURE: 97 F | DIASTOLIC BLOOD PRESSURE: 88 MMHG | RESPIRATION RATE: 18 BRPM | HEART RATE: 96 BPM

## 2019-03-16 PROCEDURE — 83605 ASSAY OF LACTIC ACID: CPT

## 2019-03-16 PROCEDURE — 96365 THER/PROPH/DIAG IV INF INIT: CPT | Mod: XU

## 2019-03-16 PROCEDURE — 85014 HEMATOCRIT: CPT

## 2019-03-16 PROCEDURE — 96361 HYDRATE IV INFUSION ADD-ON: CPT

## 2019-03-16 PROCEDURE — 99285 EMERGENCY DEPT VISIT HI MDM: CPT | Mod: 25

## 2019-03-16 PROCEDURE — 93005 ELECTROCARDIOGRAM TRACING: CPT

## 2019-03-16 PROCEDURE — 80053 COMPREHEN METABOLIC PANEL: CPT

## 2019-03-16 PROCEDURE — 82435 ASSAY OF BLOOD CHLORIDE: CPT

## 2019-03-16 PROCEDURE — 96376 TX/PRO/DX INJ SAME DRUG ADON: CPT

## 2019-03-16 PROCEDURE — 84132 ASSAY OF SERUM POTASSIUM: CPT

## 2019-03-16 PROCEDURE — 96367 TX/PROPH/DG ADDL SEQ IV INF: CPT

## 2019-03-16 PROCEDURE — 82803 BLOOD GASES ANY COMBINATION: CPT

## 2019-03-16 PROCEDURE — 94660 CPAP INITIATION&MGMT: CPT

## 2019-03-16 PROCEDURE — 82947 ASSAY GLUCOSE BLOOD QUANT: CPT

## 2019-03-16 PROCEDURE — 84295 ASSAY OF SERUM SODIUM: CPT

## 2019-03-16 PROCEDURE — G0378: CPT

## 2019-03-16 PROCEDURE — 96375 TX/PRO/DX INJ NEW DRUG ADDON: CPT

## 2019-03-16 PROCEDURE — 85027 COMPLETE CBC AUTOMATED: CPT

## 2019-03-16 PROCEDURE — 70491 CT SOFT TISSUE NECK W/DYE: CPT

## 2019-03-16 PROCEDURE — 82330 ASSAY OF CALCIUM: CPT

## 2019-03-16 RX ADMIN — PANTOPRAZOLE SODIUM 40 MILLIGRAM(S): 20 TABLET, DELAYED RELEASE ORAL at 05:48

## 2019-03-16 RX ADMIN — MORPHINE SULFATE 2 MILLIGRAM(S): 50 CAPSULE, EXTENDED RELEASE ORAL at 05:46

## 2019-03-16 RX ADMIN — SODIUM CHLORIDE 3 MILLILITER(S): 9 INJECTION INTRAMUSCULAR; INTRAVENOUS; SUBCUTANEOUS at 13:12

## 2019-03-16 RX ADMIN — Medication 1 TABLET(S): at 10:56

## 2019-03-16 RX ADMIN — OXYCODONE AND ACETAMINOPHEN 1 TABLET(S): 5; 325 TABLET ORAL at 14:04

## 2019-03-16 RX ADMIN — OXYCODONE AND ACETAMINOPHEN 1 TABLET(S): 5; 325 TABLET ORAL at 08:30

## 2019-03-16 RX ADMIN — OXYCODONE AND ACETAMINOPHEN 1 TABLET(S): 5; 325 TABLET ORAL at 14:53

## 2019-03-16 RX ADMIN — MORPHINE SULFATE 2 MILLIGRAM(S): 50 CAPSULE, EXTENDED RELEASE ORAL at 00:42

## 2019-03-16 RX ADMIN — SODIUM CHLORIDE 3 MILLILITER(S): 9 INJECTION INTRAMUSCULAR; INTRAVENOUS; SUBCUTANEOUS at 05:48

## 2019-03-16 RX ADMIN — AMPICILLIN SODIUM AND SULBACTAM SODIUM 200 GRAM(S): 250; 125 INJECTION, POWDER, FOR SUSPENSION INTRAMUSCULAR; INTRAVENOUS at 05:48

## 2019-03-16 RX ADMIN — OXYCODONE AND ACETAMINOPHEN 1 TABLET(S): 5; 325 TABLET ORAL at 07:40

## 2019-03-16 RX ADMIN — MORPHINE SULFATE 2 MILLIGRAM(S): 50 CAPSULE, EXTENDED RELEASE ORAL at 06:43

## 2019-03-16 NOTE — DISCHARGE NOTE NURSING/CASE MANAGEMENT/SOCIAL WORK - NSDCDPATPORTLINK_GEN_ALL_CORE
You can access the [facility] Patient Portal, offered by [facility], by registering with the following website: [email or web address]

## 2019-04-01 PROBLEM — K22.70 BARRETT'S ESOPHAGUS WITHOUT DYSPLASIA: Chronic | Status: ACTIVE | Noted: 2019-03-11

## 2019-04-09 ENCOUNTER — APPOINTMENT (OUTPATIENT)
Dept: OTOLARYNGOLOGY | Facility: CLINIC | Age: 59
End: 2019-04-09

## 2019-05-20 ENCOUNTER — APPOINTMENT (OUTPATIENT)
Dept: INTERNAL MEDICINE | Facility: CLINIC | Age: 59
End: 2019-05-20

## 2019-11-19 ENCOUNTER — APPOINTMENT (OUTPATIENT)
Dept: GASTROENTEROLOGY | Facility: CLINIC | Age: 59
End: 2019-11-19
Payer: COMMERCIAL

## 2019-11-19 VITALS
BODY MASS INDEX: 38.4 KG/M2 | TEMPERATURE: 98 F | DIASTOLIC BLOOD PRESSURE: 70 MMHG | SYSTOLIC BLOOD PRESSURE: 120 MMHG | WEIGHT: 268.25 LBS | OXYGEN SATURATION: 96 % | HEART RATE: 86 BPM | HEIGHT: 70 IN

## 2019-11-19 DIAGNOSIS — Z86.19 PERSONAL HISTORY OF OTHER INFECTIOUS AND PARASITIC DISEASES: ICD-10-CM

## 2019-11-19 PROCEDURE — 99215 OFFICE O/P EST HI 40 MIN: CPT

## 2019-11-20 LAB
ALBUMIN SERPL ELPH-MCNC: 4.8 G/DL
ALP BLD-CCNC: 50 U/L
ALT SERPL-CCNC: 32 U/L
ANION GAP SERPL CALC-SCNC: 16 MMOL/L
AST SERPL-CCNC: 18 U/L
BASOPHILS # BLD AUTO: 0.07 K/UL
BASOPHILS NFR BLD AUTO: 0.9 %
BILIRUB SERPL-MCNC: 0.4 MG/DL
BUN SERPL-MCNC: 16 MG/DL
CALCIUM SERPL-MCNC: 10.3 MG/DL
CHLORIDE SERPL-SCNC: 104 MMOL/L
CO2 SERPL-SCNC: 23 MMOL/L
CREAT SERPL-MCNC: 0.92 MG/DL
EOSINOPHIL # BLD AUTO: 0.08 K/UL
EOSINOPHIL NFR BLD AUTO: 1 %
GLUCOSE SERPL-MCNC: 93 MG/DL
HCT VFR BLD CALC: 48.7 %
HGB BLD-MCNC: 16.1 G/DL
IGA SER QL IEP: 161 MG/DL
IMM GRANULOCYTES NFR BLD AUTO: 0.8 %
LYMPHOCYTES # BLD AUTO: 1.66 K/UL
LYMPHOCYTES NFR BLD AUTO: 21.8 %
MAN DIFF?: NORMAL
MCHC RBC-ENTMCNC: 31.7 PG
MCHC RBC-ENTMCNC: 33.1 GM/DL
MCV RBC AUTO: 95.9 FL
MONOCYTES # BLD AUTO: 0.89 K/UL
MONOCYTES NFR BLD AUTO: 11.7 %
NEUTROPHILS # BLD AUTO: 4.86 K/UL
NEUTROPHILS NFR BLD AUTO: 63.8 %
PLATELET # BLD AUTO: 194 K/UL
POTASSIUM SERPL-SCNC: 4.3 MMOL/L
PROT SERPL-MCNC: 7.3 G/DL
RBC # BLD: 5.08 M/UL
RBC # FLD: 13.1 %
SODIUM SERPL-SCNC: 143 MMOL/L
TSH SERPL-ACNC: 2.32 UIU/ML
TTG IGA SER IA-ACNC: <1.2 U/ML
TTG IGA SER-ACNC: NEGATIVE
TTG IGG SER IA-ACNC: 1.6 U/ML
TTG IGG SER IA-ACNC: NEGATIVE
WBC # FLD AUTO: 7.62 K/UL

## 2019-12-02 ENCOUNTER — OTHER (OUTPATIENT)
Age: 59
End: 2019-12-02

## 2019-12-02 LAB — UREA BREATH TEST QL: NEGATIVE

## 2019-12-10 ENCOUNTER — MEDICATION RENEWAL (OUTPATIENT)
Age: 59
End: 2019-12-10

## 2019-12-16 PROBLEM — Z86.19 HISTORY OF HELICOBACTER PYLORI INFECTION: Status: RESOLVED | Noted: 2018-02-28 | Resolved: 2019-12-16

## 2019-12-16 NOTE — HISTORY OF PRESENT ILLNESS
[FreeTextEntry1] : 58 yo M pmh obesity, h/o HP s/p eradication, GERD c/b SSBE with LGD s/p Ablation with Chandu Stewart MD who presents to now establish care with me.  Patient previously saw Gurpreet Quintero MD - however he has since left the practice.  This will serve as an initial visit between us to re-establish care. \par \par Bloating:\par At least one episode of bloating\par Both sensation of bloating and physical distension\par Rare diarrhea, rare looser stools\par Spicy foods and heavier foods are triggers\par No soda, carbonated beverages\par There is caffeine - but limited amount\par This is more new for patient and has not happened since then.\par No n/v/early satiety\par + Belching, + Flatus\par \par CRC Screening - 2 polyps () - due \par Prior MD said 3 years (but no polyp > 5 mm)\par \par Low Grade Dysplasia and Gill\par On PPI at this time\par Due for repeat ablation on 3/2020 with AT\par \par HP:\par Tested positive on biopsy in \par Had treatment for 14 days of abx - he believes it was quadruple therapy\par Breath test - negative\par \par Obesity:\par Does not want referral to weight management at this time, but may want it going forward\par \par ---------------------------------------------------------------------------------------------\par Last Visit with AT 2019\par Gill's esophagus with low grade dysplasia (530.85) (K22.710)\par \par 58 year old male with Gill's with LGD on one exam. \par We discussed that dysplasia is focal and can be missed on random biopsies. We discussed the brice of advanced imaging for detection of dysplasia. \par I will schedule him for EGD with advanced imaging. \par I discussed the proposed EGD with VLE with the patient. We I discussed the risks (bleeding, infection, perforation, and anesthesia risks), benefits, and alternatives with the patient. They agree to proceed. \par The patient is scheduled for .\par Thank you for involving me in their care.\par \par Last Visit with Gurpreet Quintero 10/2018\par Gill's esophagus with low grade dysplasia (530.85) (K22.710)\par Gastroesophageal reflux disease with esophagitis (530.11) (K21.0)\par Hiatal hernia (553.3) (K44.9)\par \par GERD, Erosive Esophagitis LA Grade B improving to Grade A but not healing. \par Gill's esophagus C0M1 with low grade dysplasia. now seen on two consecutive scopes. Need to truly get the esophagitis under control to best assess and to be able to proceed w ablation and subsequent fundoplication. \par \par Repeat EGD in 3 months. Counseled on the significance of Gill's and Erosions even in the absence of symptoms. Cautioned to stop using Nexium intermittently when he is symptomatic. More consistent lately or just telling me what I want to hear. \par \par \par PRIOR TESTING:\par EGD 18: LA Grade A reflux and erosive esophagitis. Esophageal mucosal changes consistent with short-segment Gill’s esophagus. Fundus and cardia were examined on retroflexion. There is a 2cm hiatal hernia. The Flap Valve was Hill Grade 2. Normal duodenal bulb, 2nd part of the duodenum and area of the papilla. Multiple biopsies were obtained as the GE junction. Pathology: GE junction – squamocolumnar mucosa with intestinal metaplasia, compatible with Gill’s esophagus. Negative for dysplasia. HOWEVER, WATS showed Columnar epithelium with goblet cell metaplasia, consistent with Gill's esophagus, with findings compatible with low grade dysplasia. Benign squamous epithelium is also present.\par \par Bravo 90 hour wireless esophageal pH testin2018 - Esophageal acid exposure time below pH 4 on Day 1 was 16% (<5.6%) overall, 16 % upright (<8%), (He failed to record supine time) with DeMeester score 37 (<15). Esophageal acid exposure time below pH 4 on Day 2 was 13% (<5.6%) overall, 0 % upright (<8%), (he recorded that he was supine from the onset of lunch at 13:46 to 23 hours later at 12:24) 23 % supine (<2%) with DeMeester score 37 (<15). Only one symptom (during a reflux episode) was recorded). This does not allow for adequate symptom correlation. Long reflux was 3h- 13:25 to 16:27 Day 1 supine. \par Overall this study demonstrates a high degree of acid reflux, poor esophageal clearance but does not allow for assessment of symptom correlation. \par Of note is that immediately following endoscopy, the patient was hospitalized for an unrelated orthopedic pain. This limited his activity and impacted on his position. \par \par 2018 EGD/BRAVO: Arytenoid erythema, arytenoid edema, and intra-arytenoid mucosal thickening 9posterior laryngitis), thinning and erythema of vocal folds. LA Grade B esophagitis with no bleeding was found 33-36cm from the incisors; biopsied. Esophageal mucosal changes suspicious for short-segment Gill’s esophagus. The changes involved the mucosa at the upper extent of the gastric folds extending to the z-line. Circumferential salmon-colored mucosa present from 35-36 cm and hiatal narrowing at 39cm. 3cm hiatal hernia. Flap Valve was Hill Grade 2. Localized mild inflammation characterized by erythema was found in the gastric fundus; biopsied. Normal duodenum. Pathology: Stomach fundus – chronic inactive gastritis. Negative for H. pylori or intestinal metaplasia. GE Junction – low grade dysplasia in the squamocolumnar junctional mucosa. Distal esophagus – squamous and cardiac-type mucosa with features consistent with reflux esophagitis. Negative for intestinal metaplasia. Negative for fungal organisms.\par \par 2016 H.pylori breath test: Negative\par \par 2016 EGD: Arytenoid erythema and edema. Normal esophagus without erosions. Mildly irregular squamocolumnar junction. 3cm hiatal hernia. Gastric body and antrum erythema without erosions or ulcer. Normal duodenal bulb and post bulbar duodenum. \par Pathology: Duodenum – normal, no evidence of Celiac Disease. Antrum/gastric body – H.pylori gastritis confirmed on Giemsa stain. No intestinal metaplasia. EG junction – chronic cardio-esophagitis. No Gill’s or fungal microorganisms. Distal esophagus – normal\par \par 2016 Colonoscopy: 3mm sessile polyp removed from splenic flexure. 6mm sessile polyp removed from rectum. Internal hemorrhoids. Surveillance colonoscopy in 3 years (2019). (Prep: Suprep, excellent).\par Pathology: Splenic flexure polyp – tubular adenoma. Rectum polyp – tubular adenoma.

## 2019-12-16 NOTE — ASSESSMENT
[FreeTextEntry1] : 56 yo M pmh obesity, h/o HP s/p eradication, GERD c/b SSBE with LGD s/p Ablation with Chandu Stewart MD who presents to now establish care with me.  His major complaint today is bloating.  Will plan for lab workup, HP breath test, and SIBO testing at this time.  In addition, he will implement Low-FODMAP diet and consider decreasing the caffeine that he does consume.  He is due for EGD with Pta RODRÍGUEZ in 3/2019 - will reach out to him to coordinate care.  Colon 2021, and otherwise he is doing well. Ultimately we will need to treat his Obesity as this will help with GERD, Bloating, and risk of neoplastic progression for Gill.\par \par Impression:\par 1) Bloating\par 2) GERD c/b SSBE with LGD s/p RFA x 1\par 3) Colon Polyps\par 4) Obesity\par 5) H/o HP s/p eradication\par \par Plan:\par 1) Labs today\par 2) HP breath test\par 3) SIBO breath test\par 4) C/w PPI (off for the above tests)\par 5) Colon 2021\par 6) EGD with AT in 3/2020\par 7) Dietary modifications discussed at length with counselling provided\par 8) All discussed at length.  All questions answered.  Plan agreed upon.  Full discussion for over 45 minutes given re-establishing care\par 9) RV after EGD

## 2019-12-16 NOTE — PHYSICAL EXAM
[Sclera] : the sclera and conjunctiva were normal [General Appearance - In No Acute Distress] : in no acute distress [General Appearance - Alert] : alert [Strabismus] : no strabismus was seen [Extraocular Movements] : extraocular movements were intact [Oropharynx] : the oropharynx was normal [Examination Of The Oral Cavity] : the lips and gums were normal [Outer Ear] : the ears and nose were normal in appearance [Neck Appearance] : the appearance of the neck was normal [Neck Cervical Mass (___cm)] : no neck mass was observed [Thyroid Nodule] : there were no palpable thyroid nodules [Thyroid Diffuse Enlargement] : the thyroid was not enlarged [Respiration, Rhythm And Depth] : normal respiratory rhythm and effort [Exaggerated Use Of Accessory Muscles For Inspiration] : no accessory muscle use [Murmurs] : no murmurs [Heart Sounds] : normal S1 and S2 [Heart Rate And Rhythm] : heart rate was normal and rhythm regular [Edema] : there was no peripheral edema [Bowel Sounds] : normal bowel sounds [Abdomen Tenderness] : non-tender [Abdomen Soft] : soft [Abdomen Mass (___ Cm)] : no abdominal mass palpated [Abnormal Walk] : normal gait [Involuntary Movements] : no involuntary movements were seen [Skin Color & Pigmentation] : normal skin color and pigmentation [] : no rash [Musculoskeletal - Swelling] : no joint swelling seen [No Focal Deficits] : no focal deficits [FreeTextEntry1] : aox3 [Affect] : the affect was normal [Impaired Insight] : insight and judgment were intact

## 2020-08-03 ENCOUNTER — APPOINTMENT (OUTPATIENT)
Dept: GASTROENTEROLOGY | Facility: HOSPITAL | Age: 60
End: 2020-08-03

## 2020-08-11 ENCOUNTER — APPOINTMENT (OUTPATIENT)
Dept: GASTROENTEROLOGY | Facility: CLINIC | Age: 60
End: 2020-08-11
Payer: COMMERCIAL

## 2020-08-11 VITALS
WEIGHT: 265 LBS | OXYGEN SATURATION: 96 % | SYSTOLIC BLOOD PRESSURE: 130 MMHG | TEMPERATURE: 97.5 F | BODY MASS INDEX: 37.94 KG/M2 | DIASTOLIC BLOOD PRESSURE: 90 MMHG | HEIGHT: 70 IN | HEART RATE: 86 BPM

## 2020-08-11 PROCEDURE — 99214 OFFICE O/P EST MOD 30 MIN: CPT

## 2020-08-11 RX ORDER — OMEPRAZOLE 40 MG/1
40 CAPSULE, DELAYED RELEASE ORAL
Qty: 30 | Refills: 2 | Status: DISCONTINUED | COMMUNITY
Start: 2020-07-20 | End: 2020-08-11

## 2020-08-11 RX ORDER — PANTOPRAZOLE 40 MG/1
40 TABLET, DELAYED RELEASE ORAL TWICE DAILY
Qty: 60 | Refills: 2 | Status: DISCONTINUED | COMMUNITY
Start: 2019-01-30 | End: 2020-08-11

## 2020-08-12 NOTE — PHYSICAL EXAM
[General Appearance - Alert] : alert [General Appearance - In No Acute Distress] : in no acute distress [Sclera] : the sclera and conjunctiva were normal [Extraocular Movements] : extraocular movements were intact [Strabismus] : no strabismus was seen [Oropharynx] : the oropharynx was normal [Outer Ear] : the ears and nose were normal in appearance [Examination Of The Oral Cavity] : the lips and gums were normal [Neck Appearance] : the appearance of the neck was normal [Neck Cervical Mass (___cm)] : no neck mass was observed [Thyroid Diffuse Enlargement] : the thyroid was not enlarged [Thyroid Nodule] : there were no palpable thyroid nodules [] : no respiratory distress [Exaggerated Use Of Accessory Muscles For Inspiration] : no accessory muscle use [Respiration, Rhythm And Depth] : normal respiratory rhythm and effort [Heart Rate And Rhythm] : heart rate was normal and rhythm regular [Heart Sounds] : normal S1 and S2 [Murmurs] : no murmurs [Edema] : there was no peripheral edema [Bowel Sounds] : normal bowel sounds [Abdomen Soft] : soft [Abdomen Tenderness] : non-tender [Abdomen Mass (___ Cm)] : no abdominal mass palpated [Impaired Insight] : insight and judgment were intact [No Focal Deficits] : no focal deficits [Affect] : the affect was normal [FreeTextEntry1] : aox3

## 2020-08-12 NOTE — ASSESSMENT
[FreeTextEntry1] : 60 yo M pmh obesity, h/o HP s/p eradication, GERD c/b SSBE with LGD s/p Ablation with Chandu Stewart MD who presents now for follow up. Overall, he is doing well.  Still some bloating but does not wish to make dietary/lifestyle changes. Advised that response may be more limited without them, but can try probiotic at this time. Has EGD set up with Pat in 10/2020 for surveillance of BE.  Will remain on PPI give SSBE.\par \par Impression:\par 1) Bloating\par 2) GERD c/b SSBE with LGD s/p RFA x 1\par 3) Colon Polyps\par 4) Obesity\par 5) H/o HP s/p eradication\par \par Plan:\par 1) Probiotic daily\par 2) Advised on Low FODMAP diet\par 3) If no response, then will need SIBO breath test\par 4) C/w PPI (off for the above tests)\par 5) Colon 2021\par 6) EGD with AT in 10/2020\par 7) All discussed at length. All questions answered. Plan agreed upon. \par 8) RV 6 months

## 2020-08-12 NOTE — HISTORY OF PRESENT ILLNESS
[FreeTextEntry1] : Last visit: 11/2019\par Plan after last visit:\par Bloating symptom (787.3) (R14.0)\par Hiatal hernia (553.3) (K44.9)\par Gastroesophageal reflux disease with esophagitis (530.11) (K21.0)\par History of colon polyps (V12.72) (Z86.010)\par History of Helicobacter pylori infection (V12.09) (Z86.19)\par Gill's esophagus with low grade dysplasia (530.85) (K22.710)\par Obesity (278.00) (E66.9)\par \par 58 yo M pmh obesity, h/o HP s/p eradication, GERD c/b SSBE with LGD s/p Ablation with Chandu Stewart MD who presents to now establish care with me. His major complaint today is bloating. Will plan for lab workup, HP breath test, and SIBO testing at this time. In addition, he will implement Low-FODMAP diet and consider decreasing the caffeine that he does consume. He is due for EGD with Pat RODRÍGUEZ in 3/2019 - will reach out to him to coordinate care. Colon 2021, and otherwise he is doing well. Ultimately we will need to treat his Obesity as this will help with GERD, Bloating, and risk of neoplastic progression for Gill.\par \par Impression:\par 1) Bloating\par 2) GERD c/b SSBE with LGD s/p RFA x 1\par 3) Colon Polyps\par 4) Obesity\par 5) H/o HP s/p eradication\par \par Plan:\par 1) Labs today\par 2) HP breath test\par 3) SIBO breath test\par 4) C/w PPI (off for the above tests)\par 5) Colon 2021\par 6) EGD with AT in 3/2020\par 7) Dietary modifications discussed at length with counselling provided\par 8) All discussed at length. All questions answered. Plan agreed upon. Full discussion for over 45 minutes given re-establishing care\par 9) RV after EGD. \par -----------------------------------------------------------------\par \par Barretts Esophagus:\par H/o LGD on prior endoscopy - nothing on the last EGD (3/2019)\par Following with Pat RODRÍGUEZ\par Still on PPI\par Neoplastic progression risk factors: , obesity, former tobacco\par Planned for repeat 10/2020\par \par GERD - still on PPI, has rare symptoms when dietary indiscretion\par \par Obesity - still trying to lose weight.  Planning to retire in near future.  After he does this -wants ot focus more on weight as he will be able to workout more.  \par \par Colon Polyps - due 2021\par \par Bloating - still largely post prandial.  no nausea.  Still belching and flatus.  Does not want to addend diet at all.  Willing to try meds.  Had HP breath test, but did not have SIBO breath test.\par --------------------------------------------------------------------------------------------------------\par EGD 3/2019\par Z line 35 cm, folds 37, C0M2\par s/p VLE\par [place] protocol\par \par Path:\par Final Diagnosis\par 1. Esophagus, 37cm, S1VL targeted endoscopic biopsy:\par - Columnar mucosa, negative for Gill's specialized\par columnar epithelium\par - Negative for dysplasia\par - No squamous epithelium present\par \par 2. Esophagus, 37cm, S2VL targeted endoscopic biopsy:\par - Columnar mucosa, negative for Gill's specialized\par columnar epithelium\par - Negative for dysplasia\par - No squamous epithelium present\par \par 3. Esophagus, 37 cm, endoscopic biopsy:\par - Gastroesophageal junctional mucosa, negative for\par Gill's specialized columnar\par epithelium\par - Negative for dysplasia\par \par 4. Esophagus, 36 cm, endoscopic biopsy:\par - Gastroesophageal junctional mucosa, negative for\par Gill's specialized columnar\par epithelium\par - Negative for dysplasia\par \par 5. Esophagus, 35 cm, endoscopic biopsy:\par - Gill's type mucosa, negative for dysplasia\par \par HP breath test 11/2019

## 2020-08-25 ENCOUNTER — APPOINTMENT (OUTPATIENT)
Dept: VASCULAR SURGERY | Facility: CLINIC | Age: 60
End: 2020-08-25
Payer: COMMERCIAL

## 2020-08-25 VITALS
DIASTOLIC BLOOD PRESSURE: 91 MMHG | HEART RATE: 86 BPM | WEIGHT: 270 LBS | SYSTOLIC BLOOD PRESSURE: 133 MMHG | BODY MASS INDEX: 39.99 KG/M2 | HEIGHT: 69 IN

## 2020-08-25 VITALS — TEMPERATURE: 97.1 F

## 2020-08-25 PROCEDURE — 93970 EXTREMITY STUDY: CPT

## 2020-08-25 PROCEDURE — 99203 OFFICE O/P NEW LOW 30 MIN: CPT

## 2020-08-25 RX ORDER — ESOMEPRAZOLE MAGNESIUM 20 MG/1
20 CAPSULE, DELAYED RELEASE ORAL
Refills: 0 | Status: DISCONTINUED | COMMUNITY
Start: 2020-08-11 | End: 2020-08-25

## 2020-08-25 RX ORDER — LACTOBACIL 2/BIFIDO 1/S.THERMO 900B CELL
PACKET (EA) ORAL
Qty: 30 | Refills: 2 | Status: DISCONTINUED | COMMUNITY
Start: 2020-08-11 | End: 2020-08-25

## 2020-08-25 RX ORDER — TERBINAFINE HYDROCHLORIDE 250 MG/1
250 TABLET ORAL
Refills: 0 | Status: DISCONTINUED | COMMUNITY
End: 2020-08-25

## 2020-08-25 NOTE — PHYSICAL EXAM
[2+] : left 2+ [Ankle Swelling (On Exam)] : present [Ankle Swelling On The Right] : mild [Ankle Swelling On The Left] : moderate [Varicose Veins Of Lower Extremities] : bilaterally [No Rash or Lesion] : No rash or lesion [Alert] : alert [Calm] : calm [JVD] : no jugular venous distention  [] : not present [Skin Ulcer] : no ulcer [de-identified] : mild calf tenderness [de-identified] : appears well

## 2020-08-25 NOTE — ASSESSMENT
[FreeTextEntry1] : 58 yo male with a history of gerd presents for evaluation of b/l lower extremity varicose veins.\par \par venous duplex shows no evidence of dvt, svt \par right lower extremity no evidence of insufficiency, left lower extremity was notable for insufficiency of the gsv and ssv proximally \par \par at this time would recommend compression stockings and elevation \par pt to follow up if no improvement in symptoms after 3 month course of conservative therapy
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
## 2020-10-09 ENCOUNTER — APPOINTMENT (OUTPATIENT)
Dept: DISASTER EMERGENCY | Facility: CLINIC | Age: 60
End: 2020-10-09

## 2020-10-09 LAB — SARS-COV-2 N GENE NPH QL NAA+PROBE: NOT DETECTED

## 2020-10-10 RX ORDER — SODIUM CHLORIDE 9 MG/ML
1000 INJECTION INTRAMUSCULAR; INTRAVENOUS; SUBCUTANEOUS
Refills: 0 | Status: DISCONTINUED | OUTPATIENT
Start: 2020-10-12 | End: 2020-10-26

## 2020-10-10 NOTE — PRE PROCEDURE NOTE - PRE PROCEDURE EVALUATION
Attending Physician:    Pat                        Procedure: EGD    Indication for Procedure: Gill's  ________________________________________________________  PAST MEDICAL & SURGICAL HISTORY:  Gill esophagus    Restless leg syndrome    MARÍA on CPAP    OA (osteoarthritis)    GERD (gastroesophageal reflux disease)    History of laminectomy      ALLERGIES:  No Known Allergies    HOME MEDICATIONS:  NexIUM 20 mg oral delayed release capsule: 1 cap(s) orally 2 times a day  Tylenol 500 mg oral tablet: 2 tab(s) orally , As Needed    AICD/PPM: [ ] yes   [ ] no    PERTINENT LAB DATA:                      PHYSICAL EXAMINATION:    T(C): --  HR: --  BP: --  RR: --  SpO2: --    Constitutional: NAD  HEENT: PERRLA, EOMI,    Neck:  No JVD  Respiratory: CTAB/L  Cardiovascular: S1 and S2  Gastrointestinal: BS+, soft, NT/ND  Extremities: No peripheral edema  Neurological: A/O x 3, no focal deficits  Psychiatric: Normal mood, normal affect  Skin: No rashes    ASA Class: I [ ]  II [ ]  III [x ]  IV [ ]    COMMENTS:    The patient is a suitable candidate for the planned procedure unless box checked [ ]  No, explain:

## 2020-10-12 ENCOUNTER — OUTPATIENT (OUTPATIENT)
Dept: OUTPATIENT SERVICES | Facility: HOSPITAL | Age: 60
LOS: 1 days | End: 2020-10-12
Payer: COMMERCIAL

## 2020-10-12 ENCOUNTER — RESULT REVIEW (OUTPATIENT)
Age: 60
End: 2020-10-12

## 2020-10-12 ENCOUNTER — APPOINTMENT (OUTPATIENT)
Dept: GASTROENTEROLOGY | Facility: HOSPITAL | Age: 60
End: 2020-10-12

## 2020-10-12 VITALS
SYSTOLIC BLOOD PRESSURE: 125 MMHG | HEART RATE: 77 BPM | DIASTOLIC BLOOD PRESSURE: 92 MMHG | OXYGEN SATURATION: 94 % | RESPIRATION RATE: 22 BRPM

## 2020-10-12 VITALS
OXYGEN SATURATION: 96 % | DIASTOLIC BLOOD PRESSURE: 85 MMHG | SYSTOLIC BLOOD PRESSURE: 123 MMHG | WEIGHT: 270.07 LBS | TEMPERATURE: 97 F | HEIGHT: 69 IN | RESPIRATION RATE: 12 BRPM | HEART RATE: 81 BPM

## 2020-10-12 DIAGNOSIS — K22.719 BARRETT'S ESOPHAGUS WITH DYSPLASIA, UNSPECIFIED: ICD-10-CM

## 2020-10-12 DIAGNOSIS — Z98.890 OTHER SPECIFIED POSTPROCEDURAL STATES: Chronic | ICD-10-CM

## 2020-10-12 PROCEDURE — 88312 SPECIAL STAINS GROUP 1: CPT | Mod: 26

## 2020-10-12 PROCEDURE — 88312 SPECIAL STAINS GROUP 1: CPT

## 2020-10-12 PROCEDURE — 88305 TISSUE EXAM BY PATHOLOGIST: CPT | Mod: 26

## 2020-10-12 PROCEDURE — 43239 EGD BIOPSY SINGLE/MULTIPLE: CPT | Mod: GC

## 2020-10-12 PROCEDURE — 43239 EGD BIOPSY SINGLE/MULTIPLE: CPT

## 2020-10-12 PROCEDURE — 88305 TISSUE EXAM BY PATHOLOGIST: CPT

## 2020-10-12 RX ORDER — ACETAMINOPHEN 500 MG
2 TABLET ORAL
Qty: 0 | Refills: 0 | DISCHARGE

## 2020-10-12 RX ADMIN — SODIUM CHLORIDE 30 MILLILITER(S): 9 INJECTION INTRAMUSCULAR; INTRAVENOUS; SUBCUTANEOUS at 08:47

## 2020-10-21 ENCOUNTER — APPOINTMENT (OUTPATIENT)
Dept: GASTROENTEROLOGY | Facility: CLINIC | Age: 60
End: 2020-10-21

## 2020-10-21 ENCOUNTER — NON-APPOINTMENT (OUTPATIENT)
Age: 60
End: 2020-10-21

## 2020-11-04 NOTE — PATIENT PROFILE ADULT - RESOURCE/ENVIRONMENTAL CONCERNS
none Perilesional Excision Additional Text (Leave Blank If You Do Not Want): The margin was drawn around the clinically apparent lesion. Incisions were then made along these lines to the appropriate tissue plane and the lesion was extirpated.

## 2020-12-10 DIAGNOSIS — Z01.818 ENCOUNTER FOR OTHER PREPROCEDURAL EXAMINATION: ICD-10-CM

## 2020-12-10 DIAGNOSIS — M79.609 PAIN IN UNSPECIFIED LIMB: ICD-10-CM

## 2020-12-12 ENCOUNTER — APPOINTMENT (OUTPATIENT)
Dept: ORTHOPEDIC SURGERY | Facility: CLINIC | Age: 60
End: 2020-12-12
Payer: COMMERCIAL

## 2020-12-12 VITALS
TEMPERATURE: 98.2 F | HEART RATE: 77 BPM | WEIGHT: 270 LBS | HEIGHT: 69 IN | BODY MASS INDEX: 39.99 KG/M2 | DIASTOLIC BLOOD PRESSURE: 86 MMHG | SYSTOLIC BLOOD PRESSURE: 135 MMHG

## 2020-12-12 PROCEDURE — 99072 ADDL SUPL MATRL&STAF TM PHE: CPT

## 2020-12-12 PROCEDURE — 73562 X-RAY EXAM OF KNEE 3: CPT | Mod: LT

## 2020-12-12 PROCEDURE — 99204 OFFICE O/P NEW MOD 45 MIN: CPT

## 2020-12-13 ENCOUNTER — TRANSCRIPTION ENCOUNTER (OUTPATIENT)
Age: 60
End: 2020-12-13

## 2020-12-15 ENCOUNTER — NON-APPOINTMENT (OUTPATIENT)
Age: 60
End: 2020-12-15

## 2020-12-15 ENCOUNTER — APPOINTMENT (OUTPATIENT)
Dept: CARDIOLOGY | Facility: CLINIC | Age: 60
End: 2020-12-15
Payer: COMMERCIAL

## 2020-12-15 ENCOUNTER — APPOINTMENT (OUTPATIENT)
Dept: VASCULAR SURGERY | Facility: CLINIC | Age: 60
End: 2020-12-15

## 2020-12-15 VITALS
HEART RATE: 87 BPM | BODY MASS INDEX: 41.92 KG/M2 | WEIGHT: 283 LBS | SYSTOLIC BLOOD PRESSURE: 136 MMHG | OXYGEN SATURATION: 96 % | HEIGHT: 69 IN | DIASTOLIC BLOOD PRESSURE: 90 MMHG

## 2020-12-15 VITALS — SYSTOLIC BLOOD PRESSURE: 134 MMHG | DIASTOLIC BLOOD PRESSURE: 86 MMHG

## 2020-12-15 DIAGNOSIS — Z01.810 ENCOUNTER FOR PREPROCEDURAL CARDIOVASCULAR EXAMINATION: ICD-10-CM

## 2020-12-15 PROCEDURE — 99072 ADDL SUPL MATRL&STAF TM PHE: CPT

## 2020-12-15 PROCEDURE — 93000 ELECTROCARDIOGRAM COMPLETE: CPT | Mod: NC

## 2020-12-15 PROCEDURE — 99244 OFF/OP CNSLTJ NEW/EST MOD 40: CPT

## 2020-12-15 RX ORDER — MULTIVITAMIN/IRON/FOLIC ACID 18MG-0.4MG
TABLET ORAL
Refills: 0 | Status: ACTIVE | COMMUNITY

## 2020-12-15 NOTE — DISCUSSION/SUMMARY
[FreeTextEntry1] : \par Preoperative cardiac examination: Mr. Baure has no past history of heart disease, normal twelve-lead ECG, and no symptoms suggesting the presence of cardiac disease; he appears stable from a cardiac standpoint to proceed with elective knee replacement surgery as planned. There is a history of remote DVT and he has seen Dr. Nicholson for preoperative vascular examination; postoperative DVT prophylaxis as per protocol.\par \par Obesity: Patient intends to make significant lifestyle modifications once he has recovered from surgery (diet and exercise) in an effort to lose weight and improve overall cardiovascular health.

## 2020-12-15 NOTE — PHYSICAL EXAM
[Eyelids - No Xanthelasma] : the eyelids demonstrated no xanthelasmas [Heart Rate And Rhythm] : heart rate and rhythm were normal [Heart Sounds] : normal S1 and S2 [Murmurs] : no murmurs present [Edema] : no peripheral edema present [Respiration, Rhythm And Depth] : normal respiratory rhythm and effort [Auscultation Breath Sounds / Voice Sounds] : lungs were clear to auscultation bilaterally [Bowel Sounds] : normal bowel sounds [Abdomen Soft] : soft [FreeTextEntry1] : Central obesity [Abnormal Walk] : normal gait [Nail Clubbing] : no clubbing of the fingernails [Cyanosis, Localized] : no localized cyanosis [] : no rash [Oriented To Time, Place, And Person] : oriented to person, place, and time [Impaired Insight] : insight and judgment were intact [Affect] : the affect was normal [Mood] : the mood was normal
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
## 2020-12-15 NOTE — REVIEW OF SYSTEMS
[Recent Weight Gain (___ Lbs)] : recent [unfilled] ~Ulb weight gain [Shortness Of Breath] : no shortness of breath [Dyspnea on exertion] : not dyspnea during exertion [Chest  Pressure] : no chest pressure [Chest Pain] : no chest pain [Lower Ext Edema] : no extremity edema [Leg Claudication] : no intermittent leg claudication [Palpitations] : no palpitations [Heartburn] : heartburn [see HPI] : see HPI [Joint Pain] : joint pain [Negative] : Heme/Lymph

## 2020-12-17 ENCOUNTER — OUTPATIENT (OUTPATIENT)
Dept: OUTPATIENT SERVICES | Facility: HOSPITAL | Age: 60
LOS: 1 days | End: 2020-12-17
Payer: COMMERCIAL

## 2020-12-17 VITALS
WEIGHT: 278 LBS | RESPIRATION RATE: 16 BRPM | TEMPERATURE: 97 F | OXYGEN SATURATION: 99 % | SYSTOLIC BLOOD PRESSURE: 140 MMHG | HEIGHT: 71 IN | DIASTOLIC BLOOD PRESSURE: 88 MMHG | HEART RATE: 76 BPM

## 2020-12-17 DIAGNOSIS — M19.90 UNSPECIFIED OSTEOARTHRITIS, UNSPECIFIED SITE: ICD-10-CM

## 2020-12-17 DIAGNOSIS — Z01.818 ENCOUNTER FOR OTHER PREPROCEDURAL EXAMINATION: ICD-10-CM

## 2020-12-17 DIAGNOSIS — Z98.890 OTHER SPECIFIED POSTPROCEDURAL STATES: Chronic | ICD-10-CM

## 2020-12-17 RX ORDER — MUPIROCIN 20 MG/G
1 OINTMENT TOPICAL
Qty: 1 | Refills: 0
Start: 2020-12-17 | End: 2020-12-21

## 2020-12-17 NOTE — H&P PST ADULT - NSICDXPASTMEDICALHX_GEN_ALL_CORE_FT
PAST MEDICAL HISTORY:  Gill esophagus     Deep vein thrombosis (DVT) rLE 15 years ago , not on any anticoagulant at present    GERD (gastroesophageal reflux disease)     OA (osteoarthritis) right knee    MARÍA on CPAP     Restless leg syndrome

## 2020-12-17 NOTE — H&P PST ADULT - ASSESSMENT
61 y/o male with right knee pain  Planned surgery.- right total knee replacement  Will obtain medical clearance/cardiac/vascular  covid testing on 12/26/  Pre op instructions provided  Instructions provided on medications to continue and to take the day morning of surgery

## 2020-12-17 NOTE — H&P PST ADULT - HISTORY OF PRESENT ILLNESS
61 y/o male with right knee pain for many years. History of injury in past. Denies any acute injury/trauma. Failed conservative therapy and was advised surgery. Not taking ant OTC pain meds at present

## 2020-12-17 NOTE — H&P PST ADULT - MUSCULOSKELETAL
details… detailed exam Right Knee/no joint erythema/no joint warmth/no calf tenderness/decreased ROM/decreased ROM due to pain/joint swelling/diminished strength

## 2020-12-18 PROBLEM — M19.90 UNSPECIFIED OSTEOARTHRITIS, UNSPECIFIED SITE: Chronic | Status: ACTIVE | Noted: 2018-05-04

## 2020-12-19 DIAGNOSIS — Z20.828 CONTACT WITH AND (SUSPECTED) EXPOSURE TO OTHER VIRAL COMMUNICABLE DISEASES: ICD-10-CM

## 2020-12-19 PROCEDURE — G0463: CPT

## 2020-12-23 ENCOUNTER — NON-APPOINTMENT (OUTPATIENT)
Age: 60
End: 2020-12-23

## 2020-12-23 ENCOUNTER — APPOINTMENT (OUTPATIENT)
Dept: INTERNAL MEDICINE | Facility: CLINIC | Age: 60
End: 2020-12-23
Payer: COMMERCIAL

## 2020-12-23 VITALS
DIASTOLIC BLOOD PRESSURE: 80 MMHG | HEIGHT: 69 IN | HEART RATE: 88 BPM | SYSTOLIC BLOOD PRESSURE: 130 MMHG | BODY MASS INDEX: 42.36 KG/M2 | WEIGHT: 286 LBS | RESPIRATION RATE: 15 BRPM

## 2020-12-23 DIAGNOSIS — Z72.3 LACK OF PHYSICAL EXERCISE: ICD-10-CM

## 2020-12-23 DIAGNOSIS — Z83.3 FAMILY HISTORY OF DIABETES MELLITUS: ICD-10-CM

## 2020-12-23 DIAGNOSIS — Z01.818 ENCOUNTER FOR OTHER PREPROCEDURAL EXAMINATION: ICD-10-CM

## 2020-12-23 PROCEDURE — 99204 OFFICE O/P NEW MOD 45 MIN: CPT | Mod: 25

## 2020-12-23 PROCEDURE — 99072 ADDL SUPL MATRL&STAF TM PHE: CPT

## 2020-12-23 PROCEDURE — 36415 COLL VENOUS BLD VENIPUNCTURE: CPT

## 2020-12-23 NOTE — PHYSICAL EXAM
[No Acute Distress] : no acute distress [Well Nourished] : well nourished [Well Developed] : well developed [Well-Appearing] : well-appearing [Normal Sclera/Conjunctiva] : normal sclera/conjunctiva [PERRL] : pupils equal round and reactive to light [EOMI] : extraocular movements intact [Normal Outer Ear/Nose] : the outer ears and nose were normal in appearance [Normal Oropharynx] : the oropharynx was normal [No JVD] : no jugular venous distention [No Lymphadenopathy] : no lymphadenopathy [Supple] : supple [Thyroid Normal, No Nodules] : the thyroid was normal and there were no nodules present [No Respiratory Distress] : no respiratory distress  [No Accessory Muscle Use] : no accessory muscle use [Clear to Auscultation] : lungs were clear to auscultation bilaterally [Normal Rate] : normal rate  [Regular Rhythm] : with a regular rhythm [Normal S1, S2] : normal S1 and S2 [No Murmur] : no murmur heard [No Carotid Bruits] : no carotid bruits [No Abdominal Bruit] : a ~M bruit was not heard ~T in the abdomen [Pedal Pulses Present] : the pedal pulses are present [No Edema] : there was no peripheral edema [No Palpable Aorta] : no palpable aorta [Soft] : abdomen soft [Non Tender] : non-tender [Non-distended] : non-distended [No Masses] : no abdominal mass palpated [No HSM] : no HSM [Normal Bowel Sounds] : normal bowel sounds [Normal Posterior Cervical Nodes] : no posterior cervical lymphadenopathy [Normal Anterior Cervical Nodes] : no anterior cervical lymphadenopathy [No CVA Tenderness] : no CVA  tenderness [No Spinal Tenderness] : no spinal tenderness [No Joint Swelling] : no joint swelling [Grossly Normal Strength/Tone] : grossly normal strength/tone [No Rash] : no rash [Coordination Grossly Intact] : coordination grossly intact [No Focal Deficits] : no focal deficits [Normal Gait] : normal gait [Speech Grossly Normal] : speech grossly normal [Memory Grossly Normal] : memory grossly normal [Normal Affect] : the affect was normal [Alert and Oriented x3] : oriented to person, place, and time [Normal Mood] : the mood was normal [Normal Insight/Judgement] : insight and judgment were intact [de-identified] : obese
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
## 2020-12-23 NOTE — CONSULT LETTER
[Dear  ___] : Dear  [unfilled], [Consult Letter:] : I had the pleasure of evaluating your patient, [unfilled]. [Please see my note below.] : Please see my note below. [Sincerely,] : Sincerely, [FreeTextEntry3] : Dre Lopez MD

## 2020-12-24 LAB
ALBUMIN SERPL ELPH-MCNC: 5 G/DL
ALP BLD-CCNC: 50 U/L
ALT SERPL-CCNC: 44 U/L
ANION GAP SERPL CALC-SCNC: 13 MMOL/L
AST SERPL-CCNC: 25 U/L
BASOPHILS # BLD AUTO: 0.06 K/UL
BASOPHILS NFR BLD AUTO: 0.8 %
BILIRUB SERPL-MCNC: 0.6 MG/DL
BUN SERPL-MCNC: 22 MG/DL
CALCIUM SERPL-MCNC: 9.9 MG/DL
CHLORIDE SERPL-SCNC: 103 MMOL/L
CO2 SERPL-SCNC: 24 MMOL/L
CREAT SERPL-MCNC: 0.94 MG/DL
EOSINOPHIL # BLD AUTO: 0.14 K/UL
EOSINOPHIL NFR BLD AUTO: 1.8 %
GLUCOSE SERPL-MCNC: 96 MG/DL
HCT VFR BLD CALC: 49.3 %
HGB BLD-MCNC: 16.4 G/DL
IMM GRANULOCYTES NFR BLD AUTO: 0.8 %
LYMPHOCYTES # BLD AUTO: 1.89 K/UL
LYMPHOCYTES NFR BLD AUTO: 23.9 %
MAN DIFF?: NORMAL
MCHC RBC-ENTMCNC: 31.8 PG
MCHC RBC-ENTMCNC: 33.3 GM/DL
MCV RBC AUTO: 95.7 FL
MONOCYTES # BLD AUTO: 0.61 K/UL
MONOCYTES NFR BLD AUTO: 7.7 %
NEUTROPHILS # BLD AUTO: 5.16 K/UL
NEUTROPHILS NFR BLD AUTO: 65 %
PLATELET # BLD AUTO: 226 K/UL
POTASSIUM SERPL-SCNC: 4.4 MMOL/L
PROT SERPL-MCNC: 7.4 G/DL
RBC # BLD: 5.15 M/UL
RBC # FLD: 12.9 %
SODIUM SERPL-SCNC: 140 MMOL/L
WBC # FLD AUTO: 7.92 K/UL

## 2020-12-26 ENCOUNTER — OUTPATIENT (OUTPATIENT)
Dept: OUTPATIENT SERVICES | Facility: HOSPITAL | Age: 60
LOS: 1 days | End: 2020-12-26
Payer: COMMERCIAL

## 2020-12-26 DIAGNOSIS — Z98.890 OTHER SPECIFIED POSTPROCEDURAL STATES: Chronic | ICD-10-CM

## 2020-12-26 DIAGNOSIS — Z20.828 CONTACT WITH AND (SUSPECTED) EXPOSURE TO OTHER VIRAL COMMUNICABLE DISEASES: ICD-10-CM

## 2020-12-26 LAB — SARS-COV-2 RNA SPEC QL NAA+PROBE: SIGNIFICANT CHANGE UP

## 2020-12-26 PROCEDURE — U0003: CPT

## 2020-12-28 ENCOUNTER — TRANSCRIPTION ENCOUNTER (OUTPATIENT)
Age: 60
End: 2020-12-28

## 2020-12-28 ENCOUNTER — RESULT REVIEW (OUTPATIENT)
Age: 60
End: 2020-12-28

## 2020-12-28 ENCOUNTER — APPOINTMENT (OUTPATIENT)
Dept: ORTHOPEDIC SURGERY | Facility: HOSPITAL | Age: 60
End: 2020-12-28

## 2020-12-28 ENCOUNTER — OUTPATIENT (OUTPATIENT)
Dept: OUTPATIENT SERVICES | Facility: HOSPITAL | Age: 60
LOS: 1 days | End: 2020-12-28
Payer: COMMERCIAL

## 2020-12-28 VITALS
DIASTOLIC BLOOD PRESSURE: 88 MMHG | SYSTOLIC BLOOD PRESSURE: 140 MMHG | WEIGHT: 278 LBS | TEMPERATURE: 97 F | HEART RATE: 76 BPM | HEIGHT: 71 IN | RESPIRATION RATE: 16 BRPM | OXYGEN SATURATION: 99 %

## 2020-12-28 DIAGNOSIS — Z98.890 OTHER SPECIFIED POSTPROCEDURAL STATES: Chronic | ICD-10-CM

## 2020-12-28 DIAGNOSIS — M19.90 UNSPECIFIED OSTEOARTHRITIS, UNSPECIFIED SITE: ICD-10-CM

## 2020-12-28 DIAGNOSIS — M17.11 UNILATERAL PRIMARY OSTEOARTHRITIS, RIGHT KNEE: ICD-10-CM

## 2020-12-28 DIAGNOSIS — G47.33 OBSTRUCTIVE SLEEP APNEA (ADULT) (PEDIATRIC): ICD-10-CM

## 2020-12-28 DIAGNOSIS — K21.9 GASTRO-ESOPHAGEAL REFLUX DISEASE WITHOUT ESOPHAGITIS: ICD-10-CM

## 2020-12-28 LAB
ABO RH CONFIRMATION: SIGNIFICANT CHANGE UP
ANION GAP SERPL CALC-SCNC: 11 MMOL/L — SIGNIFICANT CHANGE UP (ref 5–17)
APTT BLD: 31.8 SEC — SIGNIFICANT CHANGE UP (ref 27.5–35.5)
BLD GP AB SCN SERPL QL: SIGNIFICANT CHANGE UP
CHLORIDE SERPL-SCNC: 102 MMOL/L — SIGNIFICANT CHANGE UP (ref 96–108)
CO2 SERPL-SCNC: 21 MMOL/L — LOW (ref 22–31)
HCT VFR BLD CALC: 44.6 % — SIGNIFICANT CHANGE UP (ref 39–50)
HGB BLD-MCNC: 15.2 G/DL — SIGNIFICANT CHANGE UP (ref 13–17)
INR BLD: 1.09 RATIO — SIGNIFICANT CHANGE UP (ref 0.88–1.16)
POTASSIUM SERPL-MCNC: 4.2 MMOL/L — SIGNIFICANT CHANGE UP (ref 3.5–5.3)
POTASSIUM SERPL-SCNC: 4.2 MMOL/L — SIGNIFICANT CHANGE UP (ref 3.5–5.3)
PROTHROM AB SERPL-ACNC: 13.1 SEC — SIGNIFICANT CHANGE UP (ref 10.6–13.6)
SODIUM SERPL-SCNC: 134 MMOL/L — LOW (ref 135–145)

## 2020-12-28 PROCEDURE — 27447 TOTAL KNEE ARTHROPLASTY: CPT | Mod: RT

## 2020-12-28 PROCEDURE — 99243 OFF/OP CNSLTJ NEW/EST LOW 30: CPT

## 2020-12-28 PROCEDURE — 73562 X-RAY EXAM OF KNEE 3: CPT | Mod: 26,RT

## 2020-12-28 PROCEDURE — 88305 TISSUE EXAM BY PATHOLOGIST: CPT | Mod: 26

## 2020-12-28 PROCEDURE — 88311 DECALCIFY TISSUE: CPT | Mod: 26

## 2020-12-28 RX ORDER — APIXABAN 2.5 MG/1
1 TABLET, FILM COATED ORAL
Qty: 23 | Refills: 0
Start: 2020-12-28 | End: 2021-01-08

## 2020-12-28 RX ORDER — OMEPRAZOLE 10 MG/1
1 CAPSULE, DELAYED RELEASE ORAL
Qty: 30 | Refills: 1
Start: 2020-12-28 | End: 2021-02-25

## 2020-12-28 RX ORDER — SODIUM CHLORIDE 9 MG/ML
1000 INJECTION, SOLUTION INTRAVENOUS
Refills: 0 | Status: DISCONTINUED | OUTPATIENT
Start: 2020-12-28 | End: 2021-01-11

## 2020-12-28 RX ORDER — ACETAMINOPHEN 500 MG
1000 TABLET ORAL EVERY 8 HOURS
Refills: 0 | Status: DISCONTINUED | OUTPATIENT
Start: 2020-12-29 | End: 2021-01-11

## 2020-12-28 RX ORDER — PANTOPRAZOLE SODIUM 20 MG/1
40 TABLET, DELAYED RELEASE ORAL
Refills: 0 | Status: DISCONTINUED | OUTPATIENT
Start: 2020-12-28 | End: 2021-01-11

## 2020-12-28 RX ORDER — ACETAMINOPHEN 500 MG
1000 TABLET ORAL ONCE
Refills: 0 | Status: COMPLETED | OUTPATIENT
Start: 2020-12-28 | End: 2020-12-28

## 2020-12-28 RX ORDER — HYDROMORPHONE HYDROCHLORIDE 2 MG/ML
0.5 INJECTION INTRAMUSCULAR; INTRAVENOUS; SUBCUTANEOUS
Refills: 0 | Status: DISCONTINUED | OUTPATIENT
Start: 2020-12-28 | End: 2021-01-04

## 2020-12-28 RX ORDER — ONDANSETRON 8 MG/1
4 TABLET, FILM COATED ORAL EVERY 6 HOURS
Refills: 0 | Status: DISCONTINUED | OUTPATIENT
Start: 2020-12-28 | End: 2021-01-11

## 2020-12-28 RX ORDER — ASPIRIN/CALCIUM CARB/MAGNESIUM 324 MG
1 TABLET ORAL
Qty: 56 | Refills: 0
Start: 2020-12-28 | End: 2021-01-24

## 2020-12-28 RX ORDER — SODIUM CHLORIDE 9 MG/ML
1000 INJECTION, SOLUTION INTRAVENOUS
Refills: 0 | Status: DISCONTINUED | OUTPATIENT
Start: 2020-12-28 | End: 2020-12-28

## 2020-12-28 RX ORDER — APREPITANT 80 MG/1
40 CAPSULE ORAL ONCE
Refills: 0 | Status: COMPLETED | OUTPATIENT
Start: 2020-12-28 | End: 2020-12-28

## 2020-12-28 RX ORDER — APIXABAN 2.5 MG/1
2.5 TABLET, FILM COATED ORAL EVERY 12 HOURS
Refills: 0 | Status: DISCONTINUED | OUTPATIENT
Start: 2020-12-30 | End: 2021-01-11

## 2020-12-28 RX ORDER — ESOMEPRAZOLE MAGNESIUM 40 MG/1
1 CAPSULE, DELAYED RELEASE ORAL
Qty: 0 | Refills: 0 | DISCHARGE

## 2020-12-28 RX ORDER — CEFAZOLIN SODIUM 1 G
3000 VIAL (EA) INJECTION ONCE
Refills: 0 | Status: COMPLETED | OUTPATIENT
Start: 2020-12-28 | End: 2020-12-28

## 2020-12-28 RX ORDER — CELECOXIB 200 MG/1
200 CAPSULE ORAL EVERY 12 HOURS
Refills: 0 | Status: DISCONTINUED | OUTPATIENT
Start: 2020-12-29 | End: 2020-12-29

## 2020-12-28 RX ORDER — ESOMEPRAZOLE MAGNESIUM 40 MG/1
1 CAPSULE, DELAYED RELEASE ORAL
Qty: 60 | Refills: 1
Start: 2020-12-28 | End: 2021-02-25

## 2020-12-28 RX ORDER — TRANEXAMIC ACID 100 MG/ML
1000 INJECTION, SOLUTION INTRAVENOUS ONCE
Refills: 0 | Status: COMPLETED | OUTPATIENT
Start: 2020-12-28 | End: 2020-12-28

## 2020-12-28 RX ORDER — HYDROMORPHONE HYDROCHLORIDE 2 MG/ML
0.5 INJECTION INTRAMUSCULAR; INTRAVENOUS; SUBCUTANEOUS
Refills: 0 | Status: DISCONTINUED | OUTPATIENT
Start: 2020-12-28 | End: 2020-12-28

## 2020-12-28 RX ORDER — POLYETHYLENE GLYCOL 3350 17 G/17G
17 POWDER, FOR SOLUTION ORAL AT BEDTIME
Refills: 0 | Status: DISCONTINUED | OUTPATIENT
Start: 2020-12-28 | End: 2021-01-11

## 2020-12-28 RX ORDER — ONDANSETRON 8 MG/1
4 TABLET, FILM COATED ORAL ONCE
Refills: 0 | Status: DISCONTINUED | OUTPATIENT
Start: 2020-12-28 | End: 2020-12-28

## 2020-12-28 RX ORDER — CEFAZOLIN SODIUM 1 G
3000 VIAL (EA) INJECTION EVERY 8 HOURS
Refills: 0 | Status: COMPLETED | OUTPATIENT
Start: 2020-12-28 | End: 2020-12-29

## 2020-12-28 RX ORDER — APIXABAN 2.5 MG/1
2.5 TABLET, FILM COATED ORAL
Refills: 0 | Status: DISCONTINUED | OUTPATIENT
Start: 2020-12-29 | End: 2021-01-11

## 2020-12-28 RX ORDER — OXYCODONE HYDROCHLORIDE 5 MG/1
5 TABLET ORAL
Refills: 0 | Status: DISCONTINUED | OUTPATIENT
Start: 2020-12-28 | End: 2020-12-29

## 2020-12-28 RX ORDER — CHLORHEXIDINE GLUCONATE 213 G/1000ML
1 SOLUTION TOPICAL ONCE
Refills: 0 | Status: COMPLETED | OUTPATIENT
Start: 2020-12-28 | End: 2020-12-28

## 2020-12-28 RX ORDER — SENNA PLUS 8.6 MG/1
2 TABLET ORAL AT BEDTIME
Refills: 0 | Status: DISCONTINUED | OUTPATIENT
Start: 2020-12-28 | End: 2021-01-11

## 2020-12-28 RX ORDER — MAGNESIUM HYDROXIDE 400 MG/1
30 TABLET, CHEWABLE ORAL DAILY
Refills: 0 | Status: DISCONTINUED | OUTPATIENT
Start: 2020-12-28 | End: 2021-01-11

## 2020-12-28 RX ORDER — SODIUM CHLORIDE 9 MG/ML
500 INJECTION INTRAMUSCULAR; INTRAVENOUS; SUBCUTANEOUS ONCE
Refills: 0 | Status: COMPLETED | OUTPATIENT
Start: 2020-12-28 | End: 2020-12-28

## 2020-12-28 RX ORDER — CEFAZOLIN SODIUM 1 G
2000 VIAL (EA) INJECTION ONCE
Refills: 0 | Status: DISCONTINUED | OUTPATIENT
Start: 2020-12-28 | End: 2020-12-28

## 2020-12-28 RX ORDER — OXYCODONE HYDROCHLORIDE 5 MG/1
10 TABLET ORAL
Refills: 0 | Status: DISCONTINUED | OUTPATIENT
Start: 2020-12-28 | End: 2020-12-29

## 2020-12-28 RX ADMIN — OXYCODONE HYDROCHLORIDE 10 MILLIGRAM(S): 5 TABLET ORAL at 18:05

## 2020-12-28 RX ADMIN — Medication 200 MILLIGRAM(S): at 18:02

## 2020-12-28 RX ADMIN — Medication 400 MILLIGRAM(S): at 17:15

## 2020-12-28 RX ADMIN — Medication 400 MILLIGRAM(S): at 23:38

## 2020-12-28 RX ADMIN — SODIUM CHLORIDE 125 MILLILITER(S): 9 INJECTION, SOLUTION INTRAVENOUS at 17:15

## 2020-12-28 RX ADMIN — Medication 1000 MILLIGRAM(S): at 23:58

## 2020-12-28 RX ADMIN — Medication 1000 MILLIGRAM(S): at 17:30

## 2020-12-28 RX ADMIN — SODIUM CHLORIDE 1000 MILLILITER(S): 9 INJECTION INTRAMUSCULAR; INTRAVENOUS; SUBCUTANEOUS at 13:02

## 2020-12-28 RX ADMIN — SODIUM CHLORIDE 75 MILLILITER(S): 9 INJECTION, SOLUTION INTRAVENOUS at 13:01

## 2020-12-28 RX ADMIN — OXYCODONE HYDROCHLORIDE 10 MILLIGRAM(S): 5 TABLET ORAL at 17:35

## 2020-12-28 RX ADMIN — CHLORHEXIDINE GLUCONATE 1 APPLICATION(S): 213 SOLUTION TOPICAL at 09:03

## 2020-12-28 RX ADMIN — APREPITANT 40 MILLIGRAM(S): 80 CAPSULE ORAL at 09:02

## 2020-12-28 NOTE — PHYSICAL THERAPY INITIAL EVALUATION ADULT - TRANSFER TRAINING, PT EVAL
Pt will perform sit <-> stand transfer with RW in 1-2 days Pt will perform sit <-> stand transfer with RW independently in 1-2 days

## 2020-12-28 NOTE — CONSULT NOTE ADULT - PROBLEM SELECTOR RECOMMENDATION 9
Pain Management: acceptable- continue current care Tylenol ATC/Celebrex ATC/ Oxycodone PRN  Continue PT/OT  DVT proph: [  ] low risk - Aspirin  [  ] high risk -Lovenox [ x ] high risk - Eliquis [  ] other:_________  DC plan:  [x  ] Home with HC  [  ] Rehab   [  ] TBD  [  ]other:___________

## 2020-12-28 NOTE — DISCHARGE NOTE PROVIDER - HOSPITAL COURSE
This patient was admitted to [facility] with a history of severe degenerative joint disease of the right knee.  Patient underwent Pre-Surgical Testing and was medically cleared to undergo elective procedure. Patient underwent right TKR by Dr. Veda Khan on 12/28/20. Procedure was well tolerated.  No operative or branden-operative complications arose during patient's hospital course.  Patient received antibiotic according to SCIP guidelines for infection prevention. Eliquis 2.5mg q 12h was given for DVT prophylaxis, in addition to the use of SCDs.  Anesthesia, Medical Hospitalist, Physical Therapy and Occupational Therapy were consulted. Patient is stable for discharge with a good prognosis.  Appropriate discharge instructions and medications are provided in this document.

## 2020-12-28 NOTE — PHYSICAL THERAPY INITIAL EVALUATION ADULT - GENERAL OBSERVATIONS, REHAB EVAL
Pt received supine in bed. All lines intact. Pt agreeable to physical therapy. +SCD + IV + bandage right LE C/D/I

## 2020-12-28 NOTE — DISCHARGE NOTE PROVIDER - NSDCCPCAREPLAN_GEN_ALL_CORE_FT
PRINCIPAL DISCHARGE DIAGNOSIS  Diagnosis: Primary osteoarthritis of right knee  Assessment and Plan of Treatment: For your total knee replacement:  Physical Therapy/Occupational Therapy for: ambulation, transfers, stairs, ADL's (activities of daily living), range of motion exercises, and isometrics  -Activity  • Weight Bearing as tolerated with rolling walker.  • Ice 20 minutes several times daily with at least a 20 minute break in between icing sessions  • Take short, frequent walks increasing the distance that you walk each day as tolerated.  • Change your position every hour to decrease pain and stiffness.  • Continue the exercises taught to you by your physical therapist.  • No driving until cleared by the doctor.  • No tub baths, hot tubs, or swimming pools until instructed by your doctor.  • Do not squat down on the floor.  • Do not kneel or twist your knee.  • Range of Motion Goals: Flexion= 120 degrees, Extension = 0 degrees  Daily dressing changes if incision is not completely dry.  If inicision is dry, it may be left open to air.  Keep incision clean. DO NOT APPLY ANYTHING to incision site (salves/ointments/creams).  May shower post-op if no drainage from incision.  Do not scrub incision site. Pat dry after shower.  Suture/Prineo removal 2 weeks after surgery at Surgeon's office.         PRINCIPAL DISCHARGE DIAGNOSIS  Diagnosis: Primary osteoarthritis of right knee  Assessment and Plan of Treatment: For your total knee replacement:  Physical Therapy/Occupational Therapy for: ambulation, transfers, stairs, ADL's (activities of daily living), range of motion exercises, and isometrics  -Activity  • Weight Bearing as tolerated with rolling walker.  • Ice 20 minutes several times daily with at least a 20 minute break in between icing sessions  • Take short, frequent walks increasing the distance that you walk each day as tolerated.  • Change your position every hour to decrease pain and stiffness.  • Continue the exercises taught to you by your physical therapist.  • No driving until cleared by the doctor.  • No tub baths, hot tubs, or swimming pools until instructed by your doctor.  • Do not squat down on the floor.  • Do not kneel or twist your knee.  • Range of Motion Goals: Flexion= 120 degrees, Extension = 0 degrees  Daily dressing changes if incision is not completely dry.  If inicision is dry, it may be left open to air.  Keep incision clean. DO NOT APPLY ANYTHING to incision site (salves/ointments/creams).  May shower post-op if no drainage from incision.  Do not scrub incision site. Pat dry after shower.  Prineo removal 2 weeks after surgery at Surgeon's office.

## 2020-12-28 NOTE — DISCHARGE NOTE PROVIDER - PROVIDER TOKENS
PROVIDER:[TOKEN:[[id number]],SCHEDULEDAPPT:[01/11/2021],SCHEDULEDAPPTTIME:[11:20 AM],ESTABLISHEDPATIENT:[T]]

## 2020-12-28 NOTE — DISCHARGE NOTE PROVIDER - NSDCFUSCHEDAPPT_GEN_ALL_CORE_FT
ARVIND AYALA ; 12/28/2020 ; [facility] [address] [facility] [place] ; 01/11/2021 ; [facility] [address] [facility]  ARVIND AAYLA ; 03/02/2021 ; [facility] [address] [place] [address] ARVIND AYALA ; 01/11/2021 ; [address]  ARVIND AYALA ; 03/02/2021 ; [facility] [address] [place] [address]

## 2020-12-28 NOTE — CONSULT NOTE ADULT - PROBLEM SELECTOR RECOMMENDATION 2
patient does not know settings.  will order empiric CPAP.  if does not tolerate will wear oxygen.   remote tele on 2w.

## 2020-12-28 NOTE — OCCUPATIONAL THERAPY INITIAL EVALUATION ADULT - LIVES WITH, PROFILE
Pt lives with his wife, son, and grandchildren in a private home, 3 steps to enter with all needs met on the 1st floor, +tub. Pt was independent with ADLs, IADLs, functional mobility/transfers prior to admission without AD./children/spouse

## 2020-12-28 NOTE — PHYSICAL THERAPY INITIAL EVALUATION ADULT - GAIT TRAINING, PT EVAL
Pt will ambulate with a RW for 150 feet in 1-2 days Pt will ambulate with a RW for 150 feet independently in 1-2 days. 3 steps with SAC/HR and supervision in 1-2 days

## 2020-12-28 NOTE — DISCHARGE NOTE PROVIDER - INSTRUCTIONS
Regular diet  Pain medicine has been prescribed for you, as needed, and it often causes constipation.  Take docusate sodium (Colace) 100mg 3x daily, while taking narcotic pain medication.   For Constipation :   • Increase your water intake. Drink at least 8 glasses of water daily.  • Try adding fiber to your diet by eating fruits, vegetables and foods that are rich in grains.  • If you do experience constipation, you may take an over-the-counter laxative such as, Senokot, Miralax or  Milk of Magnesia.

## 2020-12-28 NOTE — BRIEF OPERATIVE NOTE - NSICDXBRIEFPROCEDURE_GEN_ALL_CORE_FT
PROCEDURES:  Right total knee replacement using computer navigation 28-Dec-2020 12:32:43  Collin Rehman

## 2020-12-28 NOTE — CONSULT NOTE ADULT - SUBJECTIVE AND OBJECTIVE BOX
Patient is a 60y old  Male who presents with a chief complaint of Right TKR for severe right knee OA (28 Dec 2020 13:08)    HPI: 60M with right knee pain for many years. History of injury in past. Denies any acute injury/trauma. Failed conservative therapy and was advised surgery. Has not taking any OTC pain meds.  Now s/p Right TKR.  Feeling hungry.  Pain in knee improved with medication.   no other complaints.     REVIEW OF SYSTEMS:  CONSTITUTIONAL: No fever, weight loss, or fatigue  EYES: No eye pain, visual disturbances, or discharge  ENMT:  No difficulty hearing, tinnitus, vertigo; No sinus or throat pain  NECK: No pain or stiffness  BREASTS: No pain, masses, or nipple discharge  RESPIRATORY: No cough, wheezing, chills or hemoptysis; No shortness of breath  CARDIOVASCULAR: No chest pain, palpitations, dizziness, or leg swelling  GASTROINTESTINAL: No abdominal or epigastric pain. No nausea, vomiting, or hematemesis; No diarrhea or constipation. No melena or hematochezia.  GENITOURINARY: No dysuria, frequency, hematuria, or incontinence  NEUROLOGICAL: No headaches, memory loss, loss of strength, numbness, or tremors  SKIN: No itching, burning, rashes, or lesions   LYMPH NODES: No enlarged glands  ENDOCRINE: No heat or cold intolerance; No hair loss  MUSCULOSKELETAL: No muscle or back pain  PSYCHIATRIC: No depression, anxiety, mood swings, or difficulty sleeping  HEME/LYMPH: No easy bruising, or bleeding gums  ALLERGY AND IMMUNOLOGIC: No hives or eczema    PAST MEDICAL & SURGICAL HISTORY:  Deep vein thrombosis (DVT)  rLE 15 years ago , not on any anticoagulant at present    Gill esophagus    Restless leg syndrome    MARÍA on CPAP    OA (osteoarthritis)  right knee    GERD (gastroesophageal reflux disease)    History of lithotripsy    History of laminectomy        SOCIAL HISTORY:  Residence: [ ] [facility]  [ ] [facility]  [x ] Community  [ ] Substance abuse: denies  [ ] Tobacco: quit smoking  [ ] Alcohol use: occ beer.     Allergies  No Known Allergies    MEDICATIONS  (STANDING):  acetaminophen  IVPB .. 1000 milliGRAM(s) IV Intermittent once  ceFAZolin   IVPB 3000 milliGRAM(s) IV Intermittent every 8 hours  HYDROmorphone  Injectable 0.5 milliGRAM(s) IV Push every 3 hours  lactated ringers. 1000 milliLiter(s) (125 mL/Hr) IV Continuous <Continuous>  pantoprazole    Tablet 40 milliGRAM(s) Oral before breakfast  polyethylene glycol 3350 17 Gram(s) Oral at bedtime  senna 2 Tablet(s) Oral at bedtime    MEDICATIONS  (PRN):  aluminum hydroxide/magnesium hydroxide/simethicone Suspension 30 milliLiter(s) Oral four times a day PRN Indigestion  magnesium hydroxide Suspension 30 milliLiter(s) Oral daily PRN Constipation  ondansetron Injectable 4 milliGRAM(s) IV Push every 6 hours PRN Nausea and/or Vomiting  oxyCODONE    IR 5 milliGRAM(s) Oral every 3 hours PRN Moderate Pain (4 - 6)  oxyCODONE    IR 10 milliGRAM(s) Oral every 3 hours PRN Severe Pain (7 - 10)      FAMILY HISTORY:  Family history of cerebrovascular accident (CVA)  father ,         Vital Signs Last 24 Hrs  T(C): 36.5 (28 Dec 2020 15:30), Max: 36.6 (28 Dec 2020 12:15)  T(F): 97.7 (28 Dec 2020 15:30), Max: 97.9 (28 Dec 2020 12:15)  HR: 78 (28 Dec 2020 15:30) (70 - 89)  BP: 104/62 (28 Dec 2020 15:30) (90/67 - 140/88)  BP(mean): --  RR: 18 (28 Dec 2020 15:30) (12 - 22)  SpO2: 95% (28 Dec 2020 15:30) (95% - 100%)    PHYSICAL EXAM:    GENERAL: NAD, well-groomed, well-developed  HEAD:  Atraumatic, Normocephalic  EYES: EOMI, PERRLA, conjunctiva and sclera clear  ENMT: Moist mucous membranes  NECK: Supple, No JVD, Normal thyroid  NERVOUS SYSTEM:  Alert & Oriented X3, Good concentration; Moving all 4 extremities; No gross sensory deficits  CHEST/LUNG: Clear to auscultation bilaterally; No rales, rhonchi, wheezing, or rubs  HEART: Regular rate and rhythm; No murmurs, rubs, or gallops  ABDOMEN: Soft, Nontender, Nondistended; Bowel sounds present  EXTREMITIES:  2+ Peripheral Pulses, No clubbing, cyanosis, or edema  LYMPH: No lymphadenopathy noted  /RECTAL: Not examined  BREAST: Not examined  SKIN: No rashes or lesions  INCISION: ACE wrap in place.    LABS:                        15.2   x     )-----------( x        ( 28 Dec 2020 18:13 )             44.6           PT/INR - ( 28 Dec 2020 08:30 )   PT: 13.1 sec;   INR: 1.09 ratio         PTT - ( 28 Dec 2020 08:30 )  PTT:31.8 sec    CAPILLARY BLOOD GLUCOSE          RADIOLOGY & ADDITIONAL STUDIES:    EKG:  NSR  Personally Reviewed:  [x ] YES     Imaging: TKr in place  Personally Reviewed:  [ x] YES     Consultant(s) Notes Reviewed:  pre-op med and cardio clearances reviewed.     Care Discussed with Consultants/Other Providers:

## 2020-12-28 NOTE — PHYSICAL THERAPY INITIAL EVALUATION ADULT - RANGE OF MOTION EXAMINATION, REHAB EVAL
right knee flexion to 60 degrees/bilateral upper extremity ROM was WNL (within normal limits)/Left LE ROM was WFL (within functional limits)/deficits as listed below

## 2020-12-28 NOTE — OCCUPATIONAL THERAPY INITIAL EVALUATION ADULT - ANTICIPATED DISCHARGE DISPOSITION, OT EVAL
Recommend supervision/assist for functional activities prn which pt states family will provide./no needs

## 2020-12-28 NOTE — DISCHARGE NOTE PROVIDER - CARE PROVIDER_API CALL
Veda Khan)  Orthopedics  [address] [facility], [address]  [place] [address]  Phone: [phone]  Fax: [phone]  Established Patient  Scheduled Appointment: 01/11/2021 11:20 AM

## 2020-12-28 NOTE — PHYSICAL THERAPY INITIAL EVALUATION ADULT - PATIENT/FAMILY/SIGNIFICANT OTHER GOALS STATEMENT, PT EVAL
Addended by: FABIAN TORRES on: 6/26/2017 07:59 AM     Modules accepted: Orders     To feel better and return home.

## 2020-12-28 NOTE — PHYSICAL THERAPY INITIAL EVALUATION ADULT - ADDITIONAL COMMENTS
Pt lives in private home with 3 CATHY no HR and no steps inside. Pt rec'd RW and Commode in preparation for surgery.

## 2020-12-28 NOTE — DISCHARGE NOTE PROVIDER - NSDCMRMEDTOKEN_GEN_ALL_CORE_FT
3:1 Commode: Dx: s/p  Right TKR  Aspirin Enteric Coated 81 mg oral delayed release tablet: Start 1 tab orally every 12 hours for 28 days, once Eliquis is completed.  Eliquis 2.5 mg oral tablet: 1 tab orally every 12 hours. Change to Aspirin 81mg every 12 hours for 28 days, once Eliquis is completed.   mupirocin 2% topical ointment: Apply topically to affected area 2 times a day to nostrils for 5 days   NexIUM 20 mg oral delayed release capsule: 1 cap(s) orally 2 times a day  Rolling Walker with 5 inch wheels: Dx: s/p  Right TKR   3:1 Commode: Dx: s/p  Right TKR  acetaminophen 500 mg oral tablet: 2 tab(s) orally every 8 hours  Aspirin Enteric Coated 81 mg oral delayed release tablet: Start 1 tab orally every 12 hours for 28 days, once Eliquis is completed.  celecoxib 100 mg oral capsule: 1 cap(s) orally every 12 hours, Take at least 2 hours after aspirin  Eliquis 2.5 mg oral tablet: 1 tab orally every 12 hours. Change to Aspirin 81mg every 12 hours for 28 days, once Eliquis is completed.   HYDROmorphone 2 mg oral tablet: 1-2 tab(s) orally every 4 hours, As Needed -Moderate-Severe Pain    Reference #: [id number] MDD:6  NexIUM 20 mg oral delayed release capsule: 1 cap(s) orally 2 times a day  polyethylene glycol 3350 oral powder for reconstitution: 17 gram(s) orally once a day (at bedtime), As Needed - for constipation  Rolling Walker with 5 inch wheels: Dx: s/p  Right TKR  senna oral tablet: 2 tab(s) orally once a day (at bedtime), As Needed - for constipation

## 2020-12-29 ENCOUNTER — TRANSCRIPTION ENCOUNTER (OUTPATIENT)
Age: 60
End: 2020-12-29

## 2020-12-29 VITALS
SYSTOLIC BLOOD PRESSURE: 132 MMHG | TEMPERATURE: 98 F | DIASTOLIC BLOOD PRESSURE: 82 MMHG | OXYGEN SATURATION: 95 % | HEART RATE: 81 BPM | RESPIRATION RATE: 17 BRPM

## 2020-12-29 LAB
ANION GAP SERPL CALC-SCNC: 10 MMOL/L — SIGNIFICANT CHANGE UP (ref 5–17)
BUN SERPL-MCNC: 16 MG/DL — SIGNIFICANT CHANGE UP (ref 7–23)
CALCIUM SERPL-MCNC: 8.7 MG/DL — SIGNIFICANT CHANGE UP (ref 8.4–10.5)
CHLORIDE SERPL-SCNC: 104 MMOL/L — SIGNIFICANT CHANGE UP (ref 96–108)
CO2 SERPL-SCNC: 24 MMOL/L — SIGNIFICANT CHANGE UP (ref 22–31)
CREAT SERPL-MCNC: 0.92 MG/DL — SIGNIFICANT CHANGE UP (ref 0.5–1.3)
GLUCOSE SERPL-MCNC: 127 MG/DL — HIGH (ref 70–99)
HCT VFR BLD CALC: 41.2 % — SIGNIFICANT CHANGE UP (ref 39–50)
HGB BLD-MCNC: 13.7 G/DL — SIGNIFICANT CHANGE UP (ref 13–17)
MCHC RBC-ENTMCNC: 30.8 PG — SIGNIFICANT CHANGE UP (ref 27–34)
MCHC RBC-ENTMCNC: 33.3 GM/DL — SIGNIFICANT CHANGE UP (ref 32–36)
MCV RBC AUTO: 92.6 FL — SIGNIFICANT CHANGE UP (ref 80–100)
NRBC # BLD: 0 /100 WBCS — SIGNIFICANT CHANGE UP (ref 0–0)
PLATELET # BLD AUTO: 223 K/UL — SIGNIFICANT CHANGE UP (ref 150–400)
POTASSIUM SERPL-MCNC: 4.5 MMOL/L — SIGNIFICANT CHANGE UP (ref 3.5–5.3)
POTASSIUM SERPL-SCNC: 4.5 MMOL/L — SIGNIFICANT CHANGE UP (ref 3.5–5.3)
RBC # BLD: 4.45 M/UL — SIGNIFICANT CHANGE UP (ref 4.2–5.8)
RBC # FLD: 12.1 % — SIGNIFICANT CHANGE UP (ref 10.3–14.5)
SODIUM SERPL-SCNC: 138 MMOL/L — SIGNIFICANT CHANGE UP (ref 135–145)
WBC # BLD: 17.35 K/UL — HIGH (ref 3.8–10.5)
WBC # FLD AUTO: 17.35 K/UL — HIGH (ref 3.8–10.5)

## 2020-12-29 PROCEDURE — 85018 HEMOGLOBIN: CPT

## 2020-12-29 PROCEDURE — 99232 SBSQ HOSP IP/OBS MODERATE 35: CPT

## 2020-12-29 PROCEDURE — 88305 TISSUE EXAM BY PATHOLOGIST: CPT

## 2020-12-29 PROCEDURE — 73562 X-RAY EXAM OF KNEE 3: CPT

## 2020-12-29 PROCEDURE — 85730 THROMBOPLASTIN TIME PARTIAL: CPT

## 2020-12-29 PROCEDURE — C1776: CPT

## 2020-12-29 PROCEDURE — C1889: CPT

## 2020-12-29 PROCEDURE — 86901 BLOOD TYPING SEROLOGIC RH(D): CPT

## 2020-12-29 PROCEDURE — 86900 BLOOD TYPING SEROLOGIC ABO: CPT

## 2020-12-29 PROCEDURE — 80051 ELECTROLYTE PANEL: CPT

## 2020-12-29 PROCEDURE — 88311 DECALCIFY TISSUE: CPT

## 2020-12-29 PROCEDURE — 97116 GAIT TRAINING THERAPY: CPT

## 2020-12-29 PROCEDURE — 94660 CPAP INITIATION&MGMT: CPT

## 2020-12-29 PROCEDURE — C1713: CPT

## 2020-12-29 PROCEDURE — 85610 PROTHROMBIN TIME: CPT

## 2020-12-29 PROCEDURE — 97165 OT EVAL LOW COMPLEX 30 MIN: CPT

## 2020-12-29 PROCEDURE — 85014 HEMATOCRIT: CPT

## 2020-12-29 PROCEDURE — 86850 RBC ANTIBODY SCREEN: CPT

## 2020-12-29 PROCEDURE — 97535 SELF CARE MNGMENT TRAINING: CPT

## 2020-12-29 PROCEDURE — 80048 BASIC METABOLIC PNL TOTAL CA: CPT

## 2020-12-29 PROCEDURE — 94664 DEMO&/EVAL PT USE INHALER: CPT

## 2020-12-29 PROCEDURE — 97161 PT EVAL LOW COMPLEX 20 MIN: CPT

## 2020-12-29 PROCEDURE — 97530 THERAPEUTIC ACTIVITIES: CPT

## 2020-12-29 PROCEDURE — 27447 TOTAL KNEE ARTHROPLASTY: CPT | Mod: RT

## 2020-12-29 PROCEDURE — 85027 COMPLETE CBC AUTOMATED: CPT

## 2020-12-29 PROCEDURE — 36415 COLL VENOUS BLD VENIPUNCTURE: CPT

## 2020-12-29 PROCEDURE — 97110 THERAPEUTIC EXERCISES: CPT

## 2020-12-29 RX ORDER — POLYETHYLENE GLYCOL 3350 17 G/17G
17 POWDER, FOR SOLUTION ORAL
Qty: 0 | Refills: 0 | DISCHARGE
Start: 2020-12-29

## 2020-12-29 RX ORDER — CELECOXIB 200 MG/1
1 CAPSULE ORAL
Qty: 40 | Refills: 0
Start: 2020-12-29 | End: 2021-01-17

## 2020-12-29 RX ORDER — HYDROMORPHONE HYDROCHLORIDE 2 MG/ML
1 INJECTION INTRAMUSCULAR; INTRAVENOUS; SUBCUTANEOUS
Qty: 42 | Refills: 0
Start: 2020-12-29 | End: 2021-01-04

## 2020-12-29 RX ORDER — SENNA PLUS 8.6 MG/1
2 TABLET ORAL
Qty: 0 | Refills: 0 | DISCHARGE
Start: 2020-12-29

## 2020-12-29 RX ORDER — ASPIRIN/CALCIUM CARB/MAGNESIUM 324 MG
1 TABLET ORAL
Qty: 56 | Refills: 0
Start: 2020-12-29 | End: 2021-01-25

## 2020-12-29 RX ORDER — ACETAMINOPHEN 500 MG
2 TABLET ORAL
Qty: 0 | Refills: 0 | DISCHARGE
Start: 2020-12-29

## 2020-12-29 RX ORDER — HYDROMORPHONE HYDROCHLORIDE 2 MG/ML
4 INJECTION INTRAMUSCULAR; INTRAVENOUS; SUBCUTANEOUS
Refills: 0 | Status: DISCONTINUED | OUTPATIENT
Start: 2020-12-29 | End: 2020-12-29

## 2020-12-29 RX ORDER — APIXABAN 2.5 MG/1
1 TABLET, FILM COATED ORAL
Qty: 23 | Refills: 0
Start: 2020-12-29 | End: 2021-01-09

## 2020-12-29 RX ORDER — CELECOXIB 200 MG/1
100 CAPSULE ORAL EVERY 12 HOURS
Refills: 0 | Status: DISCONTINUED | OUTPATIENT
Start: 2020-12-29 | End: 2021-01-11

## 2020-12-29 RX ORDER — ESOMEPRAZOLE MAGNESIUM 40 MG/1
1 CAPSULE, DELAYED RELEASE ORAL
Qty: 60 | Refills: 1
Start: 2020-12-29 | End: 2021-02-26

## 2020-12-29 RX ORDER — HYDROMORPHONE HYDROCHLORIDE 2 MG/ML
2 INJECTION INTRAMUSCULAR; INTRAVENOUS; SUBCUTANEOUS
Refills: 0 | Status: DISCONTINUED | OUTPATIENT
Start: 2020-12-29 | End: 2020-12-29

## 2020-12-29 RX ADMIN — OXYCODONE HYDROCHLORIDE 10 MILLIGRAM(S): 5 TABLET ORAL at 02:26

## 2020-12-29 RX ADMIN — SODIUM CHLORIDE 125 MILLILITER(S): 9 INJECTION, SOLUTION INTRAVENOUS at 02:23

## 2020-12-29 RX ADMIN — OXYCODONE HYDROCHLORIDE 10 MILLIGRAM(S): 5 TABLET ORAL at 09:05

## 2020-12-29 RX ADMIN — CELECOXIB 200 MILLIGRAM(S): 200 CAPSULE ORAL at 10:30

## 2020-12-29 RX ADMIN — CELECOXIB 200 MILLIGRAM(S): 200 CAPSULE ORAL at 09:53

## 2020-12-29 RX ADMIN — HYDROMORPHONE HYDROCHLORIDE 2 MILLIGRAM(S): 2 INJECTION INTRAMUSCULAR; INTRAVENOUS; SUBCUTANEOUS at 11:18

## 2020-12-29 RX ADMIN — Medication 1000 MILLIGRAM(S): at 12:57

## 2020-12-29 RX ADMIN — PANTOPRAZOLE SODIUM 40 MILLIGRAM(S): 20 TABLET, DELAYED RELEASE ORAL at 05:53

## 2020-12-29 RX ADMIN — Medication 1000 MILLIGRAM(S): at 13:30

## 2020-12-29 RX ADMIN — Medication 1000 MILLIGRAM(S): at 05:53

## 2020-12-29 RX ADMIN — APIXABAN 2.5 MILLIGRAM(S): 2.5 TABLET, FILM COATED ORAL at 09:53

## 2020-12-29 RX ADMIN — OXYCODONE HYDROCHLORIDE 10 MILLIGRAM(S): 5 TABLET ORAL at 03:00

## 2020-12-29 RX ADMIN — Medication 200 MILLIGRAM(S): at 02:23

## 2020-12-29 RX ADMIN — OXYCODONE HYDROCHLORIDE 10 MILLIGRAM(S): 5 TABLET ORAL at 08:12

## 2020-12-29 RX ADMIN — HYDROMORPHONE HYDROCHLORIDE 2 MILLIGRAM(S): 2 INJECTION INTRAMUSCULAR; INTRAVENOUS; SUBCUTANEOUS at 12:10

## 2020-12-29 NOTE — DISCHARGE NOTE NURSING/CASE MANAGEMENT/SOCIAL WORK - NSDCFUADDAPPT_GEN_ALL_CORE_FT
[facility] PROGRESS NOTE        ASSESSMENT/PLAN:  Akshat Segura is a 50 year old male with a history as listed below presents today with Crohn's disease.     Patient had a colonoscopy in 2013 by Dr. Barcenas which showed an ulcer in the IC valve.  He did not see GI until 2018 when he had a screening colonoscopy. He had abdominal bloating and diarrhea at that time.  Colonoscopy by Dr. Roy October of 2018 showed a deformed IC valve with clean based ulcers the normal appearing TI. Pathology was significant for focal active ileitis.  ESR and CRP were WNL. He was started on budesonide 9 mg for 6 months.  MR enterography January 2019 showed minimal thickening of the sigmoid colon, differential was nondistention versus mild colitis.  A patency capsule/VCE was not done. He had a colonoscopy October of 2019 which showed a deformed but healed IC valve with persistent ulcers in the terminal ileum.  Pathology showed minimal moderate chronic active inflammation. He was recommended to avoid aspirin and NSAIDs and to restart Entocort 9 mg for 6 months. No history of prednisone use.     1. Crohn's ileitis:   Diagnosis: First colonoscopy 10/2013 showed IC valve ulcer, presumed to be from NSAID use. Repeat colonoscopy 10/2018 confirmed chronic active ileitis.   Location of disease: Ileium, IC valve  Surgical History for IBD: None  Prior IBD medications: Budesonide 9mg   Current GI regimen: Budesonide 9mg     Labs:  -Obtain Vit D level, Vit B12 level, Folate level, Zinc level today  -Obtain Fecal calprotectin    Health Maintenance:  Vitamin B12 for Crohn's disease with ileal disease: Obtain level today  Vitamin D3: Unknown. Will obtain today.    Multivitamin: Hast taken in the past and currently is not taking. He will start taking.  Celiac status: Unknown. Will obtain TTG IGA IGG.   TB status: Unknown. Will obtain Quantiferon TB plus.  TPMP status: --  Smoking status: Non smoker.  DEXA scan every 5 years or sooner if low  bone density: Never done. Will obtain.   Yearly skin exam with a dermatologist or PCP to screen for skin changes concerning for skin cancer. He will see PCP.   Yearly exam with an ophthalmologist to screen for changes concerning for inflammatory bowel disease. Last eye exam by optometrist was within 3 months ago. He needs to see ophthalmologist.   Dental exam every 6-12 months for routine care. Last dental exam 1.5 years ago. He is due to see a dentist.     Immunization History:  -Yearly influenza vaccine. Intranasal live vaccines are contraindicated in immunosuppressed patients. Reports he got the flu shot 2019 through work but not in the [facility].   -Pneumococcal pneumonia vaccination (non-live vaccine). All adults should get PSV23 (Pneumovax) shot. If immunocompromised, should get PCV13 (Prevnar) followed by PSV23 (Penumovax) 8 weeks later, followed by a PSV23 booster after 5 years. Needs to obtain.   -Tetanus booster every 10 years or Tdap if not given within last 10 years. Last TD was 4/5/2019.   -Zoster (non-live vaccine) for ages 50. Needs to obtain.   -MMR vaccine (live vaccine). Contraindicated in immunosuppressed patients and those planning to start immunosuppressents within 4 weeks. Needs to obtain.  -Hepatitis A (non-live vaccine). Will obtain serology   -Hepatitis B (non-live vaccine). Will obtain serology  -Hepatitis B. Will obtain serology   -Varicella exposure. Not sure he had chicken pox as a child. Will obtain titer.    Access Crohn's and colitis foundation website www.ccfa.org for more information and patient education.    ATTENDING ADDENDUM:    Seen and agree.   Abdominal exam: Soft/non-distended/nontender/bowel sounds positive.  Labs and medications reviewed.   Plan discussed and outlined as above. Would obtain patency followed by VCE to assess disease burden and decide on biologic plus imuran    Isaac Roy MD         HPI:  Akshat Segura is a 50 year old male with a past medical history of oral  aphthous ulcer, ileitis, bronchitis presents with Crohn's disease.    Patient had a colonoscopy in 2013 by Dr. Barcenas which showed an ulcer in the IC valve.  He did not see GI until 2018 when he had a screening colonoscopy. He had abdominal bloating and diarrhea at that time.  Colonoscopy by Dr. Roy October of 2018 showed a deformed IC valve with clean based ulcers the normal appearing TI. Pathology was significant for focal active ileitis.  ESR and CRP were WNL. He was started on budesonide 9 mg for 6 months.  MR enterography January 2019 showed minimal thickening of the sigmoid colon, differential was nondistention versus mild colitis.  A patency capsule/VCE was not done. He had a colonoscopy October of 2019 which showed a deformed but healed IC valve with persistent ulcers in the terminal ileum.  Pathology showed minimal moderate chronic active inflammation. He was recommended to avoid aspirin and NSAIDs and to restart Entocort 9 mg for 6 months. No history of prednisone use.     Patient presents today with his wife. He states he has been taking 3 capsules the budesonide once a day since last colonoscopy 10/2019. He continues to have abdominal bloating.  Patient has 1-3 bowel movements per day.  Usually 2 bowel movements.  Stools are type 4 on the Esmond stool Scale.  About once a month he will have a type 7 bowel movement.  Patient denies rectal bleeding, melena, abdominal pain, rectal pain.  No heartburn or  vomiting.  He does state he sometimes has nausea with dizziness.  He has a history of vertigo. He denies fever chills. He states he is not gaining weight and has lost weight since 2015, about 13lbs.  Patient eats about twice a day.  He eats fruits about twice a week and vegetables daily. He was taking a multivitamin but no longer is. No history of abdominal surgeries.       ENDOSCOPY  Colonoscopy:  10/9/2019 Dr. Roy: Healed IC valve but still persistent ulcers in the terminal ileum. Recommendations:  Avoid aspirin\NSAIDs indefinitely and start entocort 9 mg for 6 months.  Pathologic Diagnosis:     A: Terminal ileum, biopsy:     - Minimal chronic inflammation, nonspecific.     B: Terminal ileum, biopsy:     - Mild to moderate chronic active inflammation, nonspecific.    10/22/2018 Dr. Zuñiga: Hemorrhoids and ileitis Crohn's disease   Pathologic Diagnosis:  C: TERMINAL ILEUM BIOPSY:     - Focal active ileitis.     D: RANDOM COLON BIOPSY:     - Colonic mucosa with no specific pathologic change.       10/9/2013 Dr. Barcenas: Solitary 10mm in the IC valve  Pathologic Diagnosis:  -Ulcerated inflamed granulation tissue. Tissue next to ulcer shows chronic inflammation of the lamina propria, possible secondary to ulcer.     EGD:  10/22/2018 Dr. Zuñiga:  Gastritis and duodentitis  Pathologic Diagnosis:     A: SMALL BOWEL BIOPSY:     - Chronic duodenitis, nonspecific.     B: STOMACH BIOPSY:     - Chronic gastritis, moderate, with focal intestinal metaplasia and reactive     gastropathic effect.     - Immunostain for H pylori-like organisms is negative         MR Enterography: 1/31/2019  Impression:  Minimal thickening of the sigmoid colon, differential considerations include nondistention versus mild colitis. No intra-abdominal free air, mesenteric inflammation or free fluid.      Past Medical History:  Past Medical History:   Diagnosis Date   • Bronchitis    • Ileitis, terminal (CMS/HCC) 10/22/2018   • Oral aphthous ulcer        Past Surgical History:  Past Surgical History:   Procedure Laterality Date   • Colonoscopy  10/22/2018    6 month recall, Crohn's disease, Dr Zuñiga   • Colonoscopy  10/09/2019    dr zuñiga   • Colonoscopy w biopsy  10/9/2013    ulcer ileocecal valve, 5 yr recall    • Cystourethroscopy,ureter catheter  1995    for workup of kidney infection - told was normal   • Esophagogastroduodenoscopy  10/22/2018    dr zuñiga       Social History:  Social History     Tobacco Use   • Smoking status: Never Smoker   •  Smokeless tobacco: Never Used   Substance Use Topics   • Alcohol use: No     Comment: OCC.   • Drug use: No       Medications:  Current Outpatient Medications   Medication Sig   • finasteride (PROSCAR) 5 MG tablet Take 1 tablet by mouth daily.   • oxymetazoline (AFRIN 12 HOUR) 0.05 % nasal spray Spray 2 sprays in each nostril 2 times daily.   • pantoprazole (PROTONIX) 40 MG tablet Take 1 tablet by mouth daily.   • budesonide (ENTOCORT EC) 3 MG 24 hr capsule Take 3 capsules by mouth every morning.   • tamsulosin (FLOMAX) 0.4 MG Cap Take 1 capsule by mouth daily after a meal.   • hydroCORTisone (ANUSOL-HC) 2.5 % rectal cream Apply to the anus 2 times daily   • hydroCORTisone (ANUSOL-HC) 25 MG suppository Place 1 suppository rectally nightly.   • albuterol-ipratropium (COMBIVENT RESPIMAT) 100-20 MCG/ACT inhaler Inhale 1 puff into the lungs every 4 hours as needed for Shortness of Breath.   • albuterol 108 (90 Base) MCG/ACT inhaler Inhale 2 puffs into the lungs every 4 hours as needed for Shortness of Breath or Wheezing.     No current facility-administered medications for this visit.        Allergies:  ALLERGIES:   Allergen Reactions   • Penicillins SWELLING       Family History:  Family History   Problem Relation Age of Onset   • Cancer Mother         COLON   • Arthritis Father    • Stroke Father          Review of Systems:  Constitutional: as per HPI above  Gastrointestinal: as per HPI above      PHYSICAL EXAM    Visit Vitals  /68   Ht 5' 8\" (1.727 m)   Wt 78 kg   BMI 26.15 kg/m²     Constitutional:  Alert and non-ill appearing.  No apparent acute distress.  Head:  Normocephalic, atraumatic.  Eyes:  Normal conjunctivae, no drainage.  ENT:  Moist oral mucosa.  Normal lips, teeth, and gums.  Pulmonary:  Non-labored respirations, normal aeration, CTA bilaterally, no wheezes, no rhonchi, no rales.  Cardiovascular:  Regular rate and rhythm, normal S1 and S2, no murmurs.  Abdomen:  Positive bowel sounds in all four  quadrants, soft, no distention, non-tender to palpation, no voluntary or involuntary guarding, no hepatosplenomegaly  Integumentary:  Warm and dry with no obvious rash, no jaundice.  Musculoskeletal:  Normal range of motion of all four extremities, no lower extremity edema.  Neurologic:  Alert and oriented.  Psychiatric:  Mood and affect were appropriate.  Judgment and insight appear appropriate.       LABS    WBC (K/mcL)   Date Value   04/06/2019 3.7 (L)     RBC (mil/mcL)   Date Value   04/06/2019 4.68     HCT (%)   Date Value   04/06/2019 41.2     HGB (g/dL)   Date Value   04/06/2019 13.8     PLT   Date Value   04/06/2019 189 K/mcL   08/29/2013 207 K/mcL   08/31/2012 222 K/uL         IMAGING & OTHER STUDIES      Exam: MRI ENTEROGRAPHY ABD PELVIS W WO CONTRAST     Indication: INFLAMMATORY BOWEL DISEASE     Comparison: None available     TECHNIQUE: Multiplanar multisequence MRI images of the abdomen and pelvis  were obtained without and with 8 mL of Gadavist.     Findings:      Lung bases:     No pleural effusions or focal signal abnormality at the lung bases. No  cardiomegaly or pericardial effusion.     ABDOMEN:     Minimal thickening of the sigmoid colon (image 15:38). No adjacent  mesenteric inflammation or free fluid is identified.     Liver, gallbladder, intra and extrahepatic bile ducts, spleen, pancreas, adrenal glands, kidneys, stomach, small bowel and colon are otherwise  grossly unremarkable in signal.     Appendix is not well visualized.     Aorta and IVC appear unremarkable in caliber. No intra abdominal free air or free fluid.  Subcentimeter short axis retroperitoneal and intra-abdominal lymph nodes  which are not enlarged by imaging criteria.        Pelvis:     Urinary bladder, prostate and seminal vesicles are grossly unremarkable in signal.     Limited visualization of bilateral hydroceles.     Marrow signal is grossly unremarkable.          Impression:  Minimal thickening of the sigmoid colon,  differential considerations  include nondistention versus mild colitis.     No intra-abdominal free air, mesenteric inflammation or free fluid.      All questions have been answered and both verbal and written instructions have been provided.  Patient expresses understanding of the homecare instructions and reasons to seek medical care.    The patient was treated under the supervision of Dr. Isaac Roy.    Sherri Bonilla PA-C     It is advisable to follow up with your primary care provider within the next 2-3 weeks to ensure your medications are appropriate and there are no underlying problems after your procedure.

## 2020-12-29 NOTE — RESEARCH COMMUNICATION NOTE - NS AS RESEARCH COMMUNICATION NOTE FT
Enrollment in the DJO X4 Brace Study was discussed with the patient during the preoperative period.  Patient was given the consent and brace information to review.  All questions addressed.  Patient consented on the day of surgery.  Inclusion/Exclusion Criteria reviewed prior to randomization.  Patient meets criteria for study randomization.  Randomization done via the [employer] system.  Patient randomized to study arm.  Patient notified.   Ashlee Pedroza RN, Research -[phone]
Patient assisted in downloading the [employer] Jourdan onto their phone.  Instructed in proper application of the brace and the application exercises.  Daily patient diary and brace “Instructions for Use” reviewed. Patient given enrollment information folder with copy of consent, daily patient diary with return envelope, brace instructions, and contact information for research staff and [facility] study representative provided.  Ashlee Pedroza, Research -[phone]

## 2020-12-29 NOTE — PHARMACOTHERAPY INTERVENTION NOTE - COMMENTS
Admission medication reconciliation POD1
Transition of Care video discharge education - medication calendar given to patient. Pharmacy fill confirmed.
Patient with history of H.pylori and Barretts esophagus but having considerable postop pain. Celecoxib 100mg q12h x 2-3 weeks only for multimodal pain management. Nexium increased to BID from QAM while on this regimen.

## 2020-12-29 NOTE — PROGRESS NOTE ADULT - SUBJECTIVE AND OBJECTIVE BOX
Orthopaedic Post Op Note    Procedure: Right TKR  Surgeon: Veda Khan    60y Male comfortable, without complaints. Was OOB with PT.  Reported pain score = 0  Denies N/V, CP, SOB, numbness/tingling of extremities.    PE:  Vital Signs Last 24 Hrs  T(C): 36.5 (28 Dec 2020 15:30), Max: 36.6 (28 Dec 2020 12:15)  T(F): 97.7 (28 Dec 2020 15:30), Max: 97.9 (28 Dec 2020 12:15)  HR: 78 (28 Dec 2020 15:30) (70 - 89)  BP: 104/62 (28 Dec 2020 15:30) (90/67 - 140/88)  RR: 18 (28 Dec 2020 15:30) (12 - 22)  SpO2: 95% (28 Dec 2020 15:30) (95% - 100%)  General: Pt alert and oriented   Lungs: + BS CTA bilaterally  Heart: +S1 & S2 heard, RRR  Abd: + BS heard, soft, NT, ND  Right Knee Dressing: C/D/I   Bilateral LEs:  Motor:   5/5 dorsiflexion, plantarflexion, EHL  Sensation intact to LT  2+ DP Pulses  SCDs in place    A/P: 60y Male POD#0 s/p Right TKR  - Stable  - Acetaminophen, Celebrex, Dilaudid, Oxycodone for Pain Control   - DVT ppx: Eliquis 2.5mg q 12h  - Tory op IV abx: Ancef  - PT, OT per protocol  - F/U AM Labs  DCP = home tomorrow pending PT, OT, medical clearance      
Discharge medication calendar:  Eliquis 2.5mg q12h x 12 days then ASA EC 81mg q12h x 4 weeks  APAP 1000mg q8h x 2-3 weeks  Celecoxib 100mg q12h x 2-3 weeks  Nexium 20mg AMHS (home med QAM)  Narcotic PRN  Docusate 100mg TID while taking narcotic  Miralax, Senna, or Bisacodyl PRN for treatment of constipation  
ARVIND AYALA   [id number]    Pt is a 60y year old Male s/p left TKR. pain is 7/10. Accepts offer for pain medication which he says works for him.  (+) Voids, tolerating regular diet. Denies chest pain/shortness of breath/nausea/vomitting.     Vital Signs Last 24 Hrs  T(C): 36.7 (28 Dec 2020 23:15), Max: 36.7 (28 Dec 2020 19:00)  T(F): 98.1 (28 Dec 2020 23:15), Max: 98.1 (28 Dec 2020 23:15)  HR: 82 (29 Dec 2020 00:29) (70 - 98)  BP: 108/70 (28 Dec 2020 23:15) (90/67 - 140/88)  BP(mean): --  RR: 17 (28 Dec 2020 23:15) (12 - 22)  SpO2: 93% (29 Dec 2020 00:29) (93% - 100%)      12-28 @ 07:01  -  12-29 @ 07:00  --------------------------------------------------------  IN: 4535 mL / OUT: 1300 mL / NET: 3235 mL                              13.7   17.35 )-----------( 223      ( 29 Dec 2020 06:50 )             41.2     12-29    138  |  104  |  16  ----------------------------<  127<H>  4.5   |  24  |  0.92    Ca    8.7      29 Dec 2020 06:50          PE:   LLE: Dressing CDI, SILT distally, (+2) DP pulses, EHL/FHL/TA intact, Capillary refill < 2 seconds. negative calf tenderness. PAS on. HV in place  CPM:    A: 60y year old Male s/p left TKR POD#1    Plan:   -DVT ppx = Eliquis, PAS.  -PT/OT = OOB, PT/OT to eval today  -Pain control adequate with current multimodal regimen.  -Medicine to follow   -continue to follow Labs as needed - leucocytosis likely secondary to intraop steroid.  -Primary  dressing removed to prineo  -Incentive spirometry, continue use of PAS  -Dispo: Probable home today if cleared by PT and medicine.
Patient is a 60y old  Male who presents with a chief complaint of Right Knee DJD, TKR (29 Dec 2020 07:32)    INTERVAL HPI/OVERNIGHT EVENTS: didnt sleep so well last night, having some issues with pain this AM (PA already changed oxy to dilaudid.)   tolerating diet     MEDICATIONS  (STANDING):  acetaminophen   Tablet .. 1000 milliGRAM(s) Oral every 8 hours  apixaban 2.5 milliGRAM(s) Oral <User Schedule>  celecoxib 100 milliGRAM(s) Oral every 12 hours  HYDROmorphone  Injectable 0.5 milliGRAM(s) IV Push every 3 hours  lactated ringers. 1000 milliLiter(s) (125 mL/Hr) IV Continuous <Continuous>  pantoprazole    Tablet 40 milliGRAM(s) Oral before breakfast  polyethylene glycol 3350 17 Gram(s) Oral at bedtime  senna 2 Tablet(s) Oral at bedtime    MEDICATIONS  (PRN):  aluminum hydroxide/magnesium hydroxide/simethicone Suspension 30 milliLiter(s) Oral four times a day PRN Indigestion  HYDROmorphone   Tablet 2 milliGRAM(s) Oral every 3 hours PRN Moderate Pain (4 - 6)  HYDROmorphone   Tablet 4 milliGRAM(s) Oral every 3 hours PRN Severe Pain (7 - 10)  magnesium hydroxide Suspension 30 milliLiter(s) Oral daily PRN Constipation  ondansetron Injectable 4 milliGRAM(s) IV Push every 6 hours PRN Nausea and/or Vomiting    Allergies  No Known Allergies    REVIEW OF SYSTEMS:  CONSTITUTIONAL: No fever, weight loss, or fatigue  EYES: No eye pain, visual disturbances, or discharge  ENMT:  No difficulty hearing, tinnitus, vertigo; No sinus or throat pain  NECK: No pain or stiffness  BREASTS: No pain, masses, or nipple discharge  RESPIRATORY: No cough, wheezing, chills or hemoptysis; No shortness of breath  CARDIOVASCULAR: No chest pain, palpitations, or lightheadedness  GASTROINTESTINAL: No abdominal or epigastric pain. No nausea, vomiting, or hematemesis; No diarrhea or constipation. No melena or hematochezia.  GENITOURINARY: No dysuria, frequency, hematuria, or incontinence  NEUROLOGICAL: No headaches, vertigo, memory loss, loss of strength, numbness, or tremors  SKIN: No itching, burning, rashes, or lesions   LYMPH NODES: No enlarged glands  ENDOCRINE: No heat or cold intolerance; No hair loss; No polydipsia or polyuria  MUSCULOSKELETAL: No back pain  PSYCHIATRIC: No depression, anxiety, or mood swings  HEME/LYMPH: No easy bruising, or bleeding gums  ALLERGY AND IMMUNOLOGIC: No hives or eczema    Vital Signs Last 24 Hrs  T(C): 36.7 (29 Dec 2020 11:24), Max: 36.9 (29 Dec 2020 03:30)  T(F): 98 (29 Dec 2020 11:24), Max: 98.4 (29 Dec 2020 03:30)  HR: 81 (29 Dec 2020 11:24) (70 - 98)  BP: 132/82 (29 Dec 2020 11:24) (90/67 - 132/82)  BP(mean): --  RR: 17 (29 Dec 2020 11:24) (14 - 18)  SpO2: 95% (29 Dec 2020 11:24) (93% - 98%)    PHYSICAL EXAM:  GENERAL: NAD, well-groomed, well-developed  HEAD:  Atraumatic, Normocephalic  EYES:  conjunctiva and sclera clear  ENMT: Moist mucous membranes  NECK: Supple, No JVD  NERVOUS SYSTEM:  Alert & Oriented X3, Good concentration; Bilateral LE mobile, sensation to light touch intact  CHEST/LUNG: Clear to auscultation bilaterally; No rales, rhonchi, wheezing, or rubs  HEART: Regular rate and rhythm; No murmurs, rubs, or gallops  ABDOMEN: Soft, Nontender, Nondistended; Bowel sounds present  EXTREMITIES:  2+ Peripheral Pulses, No clubbing or cyanosis  LYMPH: No lymphadenopathy noted  SKIN: No rashes or lesions  INCISION:  Dressing dry and intact    LABS:                        13.7   17.35 )-----------( 223      ( 29 Dec 2020 06:50 )             41.2     29 Dec 2020 06:50    138    |  104    |  16     ----------------------------<  127    4.5     |  24     |  0.92     Ca    8.7        29 Dec 2020 06:50      PT/INR - ( 28 Dec 2020 08:30 )   PT: 13.1 sec;   INR: 1.09 ratio         PTT - ( 28 Dec 2020 08:30 )  PTT:31.8 sec    CAPILLARY BLOOD GLUCOSE          RADIOLOGY & ADDITIONAL TESTS:    Imaging Personally Reviewed:      [ ] Consultant(s) Notes Reviewed  [x] Care Discussed with Consultants/Other Providers:  Ortho PA- plan of care

## 2020-12-29 NOTE — PROGRESS NOTE ADULT - PROBLEM SELECTOR PLAN 2
patient does not know settings.  prefers home machine but did tolerate.  will wear his own CPAP at home.   remote tele on 2w.

## 2020-12-29 NOTE — DISCHARGE NOTE NURSING/CASE MANAGEMENT/SOCIAL WORK - PATIENT PORTAL LINK FT
You can access the FollowMyHealth Patient Portal offered by [facility] by registering at the following website: [email or web address]. By joining [email or web address]’s FollowMyHealth portal, you will also be able to view your health information using other applications (apps) compatible with our system.

## 2020-12-29 NOTE — PROGRESS NOTE ADULT - PROBLEM SELECTOR PLAN 1
Pain Management: care Tylenol ATC/Celebrex ATC/ Dilaudid PRN and monitor  Continue PT/OT  DVT proph: [  ] low risk - Aspirin  [  ] high risk -Lovenox [ x ] high risk - Eliquis [  ] other:_________  DC plan:  [x  ] Home with

## 2020-12-29 NOTE — RESEARCH COMMUNICATION NOTE - NS AS RESEARCH STUDY NAME FT
A Phase 4, prospective, randomized trial to evaluate postoperative outcomes in total knee arthroplasty patients using the DJO X4 brace with the [employer]
A Phase 4, prospective, randomized trial to evaluate postoperative outcomes in total knee arthroplasty patients using the DJO X4 brace with the [employer]

## 2020-12-29 NOTE — DISCHARGE NOTE NURSING/CASE MANAGEMENT/SOCIAL WORK - CASE MANAGER'S NAME
[facility] RN/ Isaura Jon can be reached at [phone] or main office # [phone]

## 2020-12-29 NOTE — DISCHARGE NOTE NURSING/CASE MANAGEMENT/SOCIAL WORK - NSSCNAMETXT_GEN_ALL_CORE
[facility]   Please contact the home care agency at  [phone] if you have not heard from them by 12 noon on the day after your hospital discharge.   Nurse to visit the day after hospital discharge; Rehabilitation Therapists to follow

## 2021-01-01 NOTE — DISCHARGE NOTE ADULT - PATIENT PORTAL LINK FT
FIDELINA recv'd call from pt's mom requesting reservation for  - . Mom confirmed the following:    · Pt does ot have permanent housing located 15 miles or more from [facility]  · Parents are at least 18 years old or older and have valid State ID/'s license  · Family has not tested positive COVID-19 or been exposed to any other contagious illnesses (I.e. cold, flu, TB, lice, etc.)  · Pt has no open DCFS cases or involvement  · Pt family does not have a need for handicap accommodations  · Pt's primary language is English. Family does not require the use of interpretor services    FIDELINA submitted online referral to [facility] for mom and dad and provided mom with anticipatory guidance r/t [facility] reservations.    Eros CLARKE-04/15/1991  Minnie CLARKE-1995    Reservation request was made for 1 night.       You can access the [facility] Patient Portal, offered by [facility], by registering with the following website: [email or web address]

## 2021-01-11 ENCOUNTER — APPOINTMENT (OUTPATIENT)
Dept: ORTHOPEDIC SURGERY | Facility: CLINIC | Age: 61
End: 2021-01-11
Payer: COMMERCIAL

## 2021-01-11 ENCOUNTER — APPOINTMENT (OUTPATIENT)
Dept: INTERNAL MEDICINE | Facility: CLINIC | Age: 61
End: 2021-01-11
Payer: COMMERCIAL

## 2021-01-11 VITALS
SYSTOLIC BLOOD PRESSURE: 128 MMHG | DIASTOLIC BLOOD PRESSURE: 84 MMHG | HEART RATE: 79 BPM | BODY MASS INDEX: 40.58 KG/M2 | HEIGHT: 69 IN | WEIGHT: 274 LBS | OXYGEN SATURATION: 97 % | RESPIRATION RATE: 16 BRPM

## 2021-01-11 VITALS — SYSTOLIC BLOOD PRESSURE: 136 MMHG | HEART RATE: 85 BPM | DIASTOLIC BLOOD PRESSURE: 89 MMHG | TEMPERATURE: 97.5 F

## 2021-01-11 DIAGNOSIS — Z12.5 ENCOUNTER FOR SCREENING FOR MALIGNANT NEOPLASM OF PROSTATE: ICD-10-CM

## 2021-01-11 PROCEDURE — 99396 PREV VISIT EST AGE 40-64: CPT | Mod: 25

## 2021-01-11 PROCEDURE — 73562 X-RAY EXAM OF KNEE 3: CPT | Mod: RT

## 2021-01-11 PROCEDURE — 99024 POSTOP FOLLOW-UP VISIT: CPT

## 2021-01-11 PROCEDURE — 36415 COLL VENOUS BLD VENIPUNCTURE: CPT

## 2021-01-11 PROCEDURE — 99072 ADDL SUPL MATRL&STAF TM PHE: CPT

## 2021-01-11 NOTE — HEALTH RISK ASSESSMENT
[Yes] : Yes [Monthly or less (1 pt)] : Monthly or less (1 point) [1 or 2 (0 pts)] : 1 or 2 (0 points) [Never (0 pts)] : Never (0 points) [No] : In the past 12 months have you used drugs other than those required for medical reasons? No [No falls in past year] : Patient reported no falls in the past year [0] : 2) Feeling down, depressed, or hopeless: Not at all (0) [None] : None [With Family] : lives with family [Employed] : employed [] :  [Sexually Active] : sexually active [Feels Safe at Home] : Feels safe at home [Fully functional (bathing, dressing, toileting, transferring, walking, feeding)] : Fully functional (bathing, dressing, toileting, transferring, walking, feeding) [Fully functional (using the telephone, shopping, preparing meals, housekeeping, doing laundry, using] : Fully functional and needs no help or supervision to perform IADLs (using the telephone, shopping, preparing meals, housekeeping, doing laundry, using transportation, managing medications and managing finances) [Smoke Detector] : smoke detector [Carbon Monoxide Detector] : carbon monoxide detector [Seat Belt] :  uses seat belt [Sunscreen] : uses sunscreen [] : No [Audit-CScore] : 1 [[id number]] : 0 [EyeExamDate] : 10/20 [Change in mental status noted] : No change in mental status noted [High Risk Behavior] : no high risk behavior [Reports changes in hearing] : Reports no changes in hearing [Reports changes in vision] : Reports no changes in vision [Reports changes in dental health] : Reports no changes in dental health

## 2021-01-11 NOTE — PHYSICAL EXAM
[Well Nourished] : well nourished [Well-Appearing] : well-appearing [Well Developed] : well developed [Normal Sclera/Conjunctiva] : normal sclera/conjunctiva [PERRL] : pupils equal round and reactive to light [EOMI] : extraocular movements intact [Normal Outer Ear/Nose] : the outer ears and nose were normal in appearance [Normal Oropharynx] : the oropharynx was normal [No JVD] : no jugular venous distention [No Lymphadenopathy] : no lymphadenopathy [Supple] : supple [Thyroid Normal, No Nodules] : the thyroid was normal and there were no nodules present [No Respiratory Distress] : no respiratory distress  [No Accessory Muscle Use] : no accessory muscle use [Clear to Auscultation] : lungs were clear to auscultation bilaterally [Normal Rate] : normal rate  [Regular Rhythm] : with a regular rhythm [Normal S1, S2] : normal S1 and S2 [No Murmur] : no murmur heard [No Carotid Bruits] : no carotid bruits [No Abdominal Bruit] : a ~M bruit was not heard ~T in the abdomen [Pedal Pulses Present] : the pedal pulses are present [No Edema] : there was no peripheral edema [No Palpable Aorta] : no palpable aorta [Soft] : abdomen soft [Non Tender] : non-tender [Non-distended] : non-distended [No Masses] : no abdominal mass palpated [No HSM] : no HSM [Normal Bowel Sounds] : normal bowel sounds [Normal Posterior Cervical Nodes] : no posterior cervical lymphadenopathy [Normal Anterior Cervical Nodes] : no anterior cervical lymphadenopathy [No CVA Tenderness] : no CVA  tenderness [No Spinal Tenderness] : no spinal tenderness [No Joint Swelling] : no joint swelling [Grossly Normal Strength/Tone] : grossly normal strength/tone [No Rash] : no rash [Coordination Grossly Intact] : coordination grossly intact [No Focal Deficits] : no focal deficits [Normal Gait] : normal gait [Memory Grossly Normal] : memory grossly normal [Speech Grossly Normal] : speech grossly normal [Normal Affect] : the affect was normal [Alert and Oriented x3] : oriented to person, place, and time [Normal Mood] : the mood was normal [Normal Insight/Judgement] : insight and judgment were intact [No Acute Distress] : no acute distress [de-identified] : obese
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
## 2021-01-12 LAB
25(OH)D3 SERPL-MCNC: 19.5 NG/ML
ALBUMIN SERPL ELPH-MCNC: 5 G/DL
ALP BLD-CCNC: 58 U/L
ALT SERPL-CCNC: 30 U/L
ANION GAP SERPL CALC-SCNC: 16 MMOL/L
AST SERPL-CCNC: 19 U/L
BASOPHILS # BLD AUTO: 0.07 K/UL
BASOPHILS NFR BLD AUTO: 1 %
BILIRUB SERPL-MCNC: 0.5 MG/DL
BUN SERPL-MCNC: 17 MG/DL
CALCIUM SERPL-MCNC: 10.1 MG/DL
CHLORIDE SERPL-SCNC: 104 MMOL/L
CHOLEST SERPL-MCNC: 196 MG/DL
CO2 SERPL-SCNC: 20 MMOL/L
CREAT SERPL-MCNC: 1.06 MG/DL
EOSINOPHIL # BLD AUTO: 0.1 K/UL
EOSINOPHIL NFR BLD AUTO: 1.5 %
ESTIMATED AVERAGE GLUCOSE: 117 MG/DL
GLUCOSE SERPL-MCNC: 105 MG/DL
HBA1C MFR BLD HPLC: 5.7 %
HCT VFR BLD CALC: 47.4 %
HDLC SERPL-MCNC: 28 MG/DL
HGB BLD-MCNC: 15.3 G/DL
IMM GRANULOCYTES NFR BLD AUTO: 1 %
IRON SATN MFR SERPL: 37 %
IRON SERPL-MCNC: 106 UG/DL
LDLC SERPL CALC-MCNC: 117 MG/DL
LYMPHOCYTES # BLD AUTO: 1.54 K/UL
LYMPHOCYTES NFR BLD AUTO: 22.5 %
MAN DIFF?: NORMAL
MCHC RBC-ENTMCNC: 31.5 PG
MCHC RBC-ENTMCNC: 32.3 GM/DL
MCV RBC AUTO: 97.7 FL
MONOCYTES # BLD AUTO: 0.51 K/UL
MONOCYTES NFR BLD AUTO: 7.5 %
NEUTROPHILS # BLD AUTO: 4.54 K/UL
NEUTROPHILS NFR BLD AUTO: 66.5 %
NONHDLC SERPL-MCNC: 168 MG/DL
PLATELET # BLD AUTO: 320 K/UL
POTASSIUM SERPL-SCNC: 4.7 MMOL/L
PROT SERPL-MCNC: 7.4 G/DL
PSA SERPL-MCNC: 0.57 NG/ML
RBC # BLD: 4.85 M/UL
RBC # FLD: 13.2 %
SODIUM SERPL-SCNC: 141 MMOL/L
TIBC SERPL-MCNC: 291 UG/DL
TRIGL SERPL-MCNC: 252 MG/DL
TSH SERPL-ACNC: 1.26 UIU/ML
UIBC SERPL-MCNC: 184 UG/DL
WBC # FLD AUTO: 6.83 K/UL

## 2021-01-12 RX ORDER — ADHESIVE TAPE 3"X 2.3 YD
50 MCG TAPE, NON-MEDICATED TOPICAL
Refills: 0 | Status: ACTIVE | COMMUNITY

## 2021-02-02 ENCOUNTER — RX CHANGE (OUTPATIENT)
Age: 61
End: 2021-02-02

## 2021-02-02 RX ORDER — ROPINIROLE 0.25 MG/1
0.25 TABLET, FILM COATED ORAL DAILY
Qty: 30 | Refills: 3 | Status: DISCONTINUED | COMMUNITY
Start: 2021-01-11 | End: 2021-02-02

## 2021-03-02 ENCOUNTER — APPOINTMENT (OUTPATIENT)
Dept: ORTHOPEDIC SURGERY | Facility: CLINIC | Age: 61
End: 2021-03-02
Payer: COMMERCIAL

## 2021-03-02 VITALS — DIASTOLIC BLOOD PRESSURE: 83 MMHG | SYSTOLIC BLOOD PRESSURE: 144 MMHG | TEMPERATURE: 97.3 F | HEART RATE: 67 BPM

## 2021-03-02 PROCEDURE — 73562 X-RAY EXAM OF KNEE 3: CPT | Mod: RT

## 2021-03-02 PROCEDURE — 99024 POSTOP FOLLOW-UP VISIT: CPT

## 2021-03-02 RX ORDER — CELECOXIB 100 MG/1
100 CAPSULE ORAL
Qty: 40 | Refills: 0 | Status: COMPLETED | COMMUNITY
Start: 2020-12-29 | End: 2021-03-02

## 2021-03-02 RX ORDER — ASPIRIN ENTERIC COATED TABLETS 81 MG 81 MG/1
81 TABLET, DELAYED RELEASE ORAL
Qty: 14 | Refills: 0 | Status: COMPLETED | COMMUNITY
Start: 2021-02-02 | End: 2021-03-02

## 2021-03-02 RX ORDER — MUPIROCIN 20 MG/G
2 OINTMENT TOPICAL
Refills: 0 | Status: COMPLETED | COMMUNITY
End: 2021-03-02

## 2021-03-02 RX ORDER — CELECOXIB 200 MG/1
200 CAPSULE ORAL
Qty: 30 | Refills: 0 | Status: COMPLETED | COMMUNITY
Start: 2021-01-19 | End: 2021-03-02

## 2021-03-09 ENCOUNTER — APPOINTMENT (OUTPATIENT)
Dept: INTERNAL MEDICINE | Facility: CLINIC | Age: 61
End: 2021-03-09
Payer: COMMERCIAL

## 2021-03-09 VITALS
DIASTOLIC BLOOD PRESSURE: 80 MMHG | RESPIRATION RATE: 16 BRPM | OXYGEN SATURATION: 98 % | HEIGHT: 69 IN | HEART RATE: 67 BPM | SYSTOLIC BLOOD PRESSURE: 130 MMHG | WEIGHT: 260 LBS | BODY MASS INDEX: 38.51 KG/M2

## 2021-03-09 PROCEDURE — 99214 OFFICE O/P EST MOD 30 MIN: CPT | Mod: 25

## 2021-03-09 PROCEDURE — 99072 ADDL SUPL MATRL&STAF TM PHE: CPT

## 2021-03-09 RX ORDER — ROPINIROLE 0.25 MG/1
0.25 TABLET, FILM COATED ORAL DAILY
Qty: 90 | Refills: 2 | Status: DISCONTINUED | COMMUNITY
Start: 2021-02-02 | End: 2021-03-09

## 2021-03-09 RX ORDER — GABAPENTIN 300 MG/1
300 CAPSULE ORAL
Qty: 30 | Refills: 0 | Status: DISCONTINUED | COMMUNITY
Start: 2021-01-26 | End: 2021-03-09
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
## 2021-03-09 NOTE — HEALTH RISK ASSESSMENT
[Yes] : Yes [Monthly or less (1 pt)] : Monthly or less (1 point) [1 or 2 (0 pts)] : 1 or 2 (0 points) [Never (0 pts)] : Never (0 points) [No] : In the past 12 months have you used drugs other than those required for medical reasons? No [0] : 2) Feeling down, depressed, or hopeless: Not at all (0) [] : No [Audit-CScore] : 1 [[id number]] : 0

## 2021-03-09 NOTE — PHYSICAL EXAM
[No Acute Distress] : no acute distress [Well Nourished] : well nourished [Well Developed] : well developed [Well-Appearing] : well-appearing [Normal Sclera/Conjunctiva] : normal sclera/conjunctiva [PERRL] : pupils equal round and reactive to light [EOMI] : extraocular movements intact [Normal Outer Ear/Nose] : the outer ears and nose were normal in appearance [Normal Oropharynx] : the oropharynx was normal [No JVD] : no jugular venous distention [No Lymphadenopathy] : no lymphadenopathy [Supple] : supple [Thyroid Normal, No Nodules] : the thyroid was normal and there were no nodules present [No Respiratory Distress] : no respiratory distress  [No Accessory Muscle Use] : no accessory muscle use [Clear to Auscultation] : lungs were clear to auscultation bilaterally [Normal Rate] : normal rate  [Regular Rhythm] : with a regular rhythm [Normal S1, S2] : normal S1 and S2 [No Murmur] : no murmur heard [No Carotid Bruits] : no carotid bruits [No Abdominal Bruit] : a ~M bruit was not heard ~T in the abdomen [Pedal Pulses Present] : the pedal pulses are present [No Edema] : there was no peripheral edema [No Palpable Aorta] : no palpable aorta [Soft] : abdomen soft [Non Tender] : non-tender [Non-distended] : non-distended [No Masses] : no abdominal mass palpated [No HSM] : no HSM [Normal Bowel Sounds] : normal bowel sounds [Normal Posterior Cervical Nodes] : no posterior cervical lymphadenopathy [Normal Anterior Cervical Nodes] : no anterior cervical lymphadenopathy [No CVA Tenderness] : no CVA  tenderness [No Spinal Tenderness] : no spinal tenderness [No Joint Swelling] : no joint swelling [Grossly Normal Strength/Tone] : grossly normal strength/tone [No Rash] : no rash [Coordination Grossly Intact] : coordination grossly intact [No Focal Deficits] : no focal deficits [Normal Gait] : normal gait [Speech Grossly Normal] : speech grossly normal [Memory Grossly Normal] : memory grossly normal [Normal Affect] : the affect was normal [Alert and Oriented x3] : oriented to person, place, and time [Normal Mood] : the mood was normal [Normal Insight/Judgement] : insight and judgment were intact [de-identified] : obese

## 2021-03-10 LAB
25(OH)D3 SERPL-MCNC: 30.1 NG/ML
ALBUMIN SERPL ELPH-MCNC: 4.7 G/DL
ALP BLD-CCNC: 68 U/L
ALT SERPL-CCNC: 25 U/L
ANION GAP SERPL CALC-SCNC: 12 MMOL/L
AST SERPL-CCNC: 17 U/L
BILIRUB SERPL-MCNC: 0.3 MG/DL
BUN SERPL-MCNC: 19 MG/DL
CALCIUM SERPL-MCNC: 9.8 MG/DL
CHLORIDE SERPL-SCNC: 103 MMOL/L
CHOLEST SERPL-MCNC: 175 MG/DL
CO2 SERPL-SCNC: 24 MMOL/L
CREAT SERPL-MCNC: 0.89 MG/DL
ESTIMATED AVERAGE GLUCOSE: 111 MG/DL
GLUCOSE SERPL-MCNC: 108 MG/DL
HBA1C MFR BLD HPLC: 5.5 %
HDLC SERPL-MCNC: 32 MG/DL
LDLC SERPL CALC-MCNC: 102 MG/DL
MAGNESIUM SERPL-MCNC: 2 MG/DL
NONHDLC SERPL-MCNC: 143 MG/DL
POTASSIUM SERPL-SCNC: 4.7 MMOL/L
PROT SERPL-MCNC: 7.3 G/DL
SODIUM SERPL-SCNC: 138 MMOL/L
TRIGL SERPL-MCNC: 208 MG/DL
TSH SERPL-ACNC: 1.05 UIU/ML

## 2021-03-11 NOTE — PRE-OP CHECKLIST - HEIGHT IN FEET
5 Elidel Counseling: Patient may experience a mild burning sensation during topical application. Elidel is not approved in children less than 2 years of age. There have been case reports of hematologic and skin malignancies in patients using topical calcineurin inhibitors although causality is questionable.

## 2021-03-29 ENCOUNTER — APPOINTMENT (OUTPATIENT)
Dept: NEUROLOGY | Facility: CLINIC | Age: 61
End: 2021-03-29
Payer: COMMERCIAL

## 2021-03-29 VITALS
WEIGHT: 258 LBS | BODY MASS INDEX: 38.1 KG/M2 | SYSTOLIC BLOOD PRESSURE: 130 MMHG | HEART RATE: 77 BPM | DIASTOLIC BLOOD PRESSURE: 89 MMHG | OXYGEN SATURATION: 96 %

## 2021-03-29 VITALS
WEIGHT: 260 LBS | DIASTOLIC BLOOD PRESSURE: 89 MMHG | HEIGHT: 69 IN | BODY MASS INDEX: 38.51 KG/M2 | OXYGEN SATURATION: 95 % | SYSTOLIC BLOOD PRESSURE: 128 MMHG | HEART RATE: 75 BPM

## 2021-03-29 PROCEDURE — 99072 ADDL SUPL MATRL&STAF TM PHE: CPT

## 2021-03-29 PROCEDURE — 99205 OFFICE O/P NEW HI 60 MIN: CPT

## 2021-03-29 RX ORDER — HYDROMORPHONE HYDROCHLORIDE 2 MG/1
2 TABLET ORAL
Qty: 40 | Refills: 0 | Status: DISCONTINUED | COMMUNITY
Start: 2021-01-08 | End: 2021-03-29

## 2021-03-29 RX ORDER — HYDROMORPHONE HYDROCHLORIDE 2 MG/1
2 TABLET ORAL
Qty: 30 | Refills: 0 | Status: DISCONTINUED | COMMUNITY
Start: 2021-03-02 | End: 2021-03-29

## 2021-03-29 NOTE — ASU PATIENT PROFILE, ADULT - REASON FOR ADMISSION, PROFILE
----- Message from Karena Nathan RN sent at 3/29/2021  8:18 AM CDT -----  Regarding: labs    ----- Message -----  From: Katy Maldonado NP  Sent: 3/29/2021   7:56 AM CDT  To: [place] [facility]    Hello:  Please call Gerard Best and tell him his blood test results are stable.  Thank you,    Katy          "im here to ,check my stomach "

## 2021-03-29 NOTE — HISTORY OF PRESENT ILLNESS
[FreeTextEntry1] : This is a 60-year-old right-handed male who presents with chief complaints of restless leg.  At this visit he is accompanied by his wife.  History is obtained from both of them.\par \par Patient states that he has had symptoms consistent with restless leg for approximately 10 years.  Initially they were mild and tolerable.  However since his right knee surgery in December 2020 he has noticed that his restless leg is become much worse.  He has symptoms mostly when he is trying to sit, relax or sleep.  They do wake him up at night and he has been very restless.  Overall he states he has been miserable with the symptoms.  He is uncomfortable even if he sitting for a short period of time, driving.  If he massages his legs or gets up and walks his symptoms get better.  Sometimes he needs to run on the treadmill to make his symptoms improve.\par \par He has tried gabapentin unsure how much or why it was stopped.  He also takes Tylenol and ibuprofen as needed.  Recently he was initiated on pramipexole 0.125 mg which she has titrated up to 2 tablets at bedtime.  So far he is not had much relief.  He denies any side effects on it.\par \par He has significant family history of Parkinson's disease.  He is 1 of 10 siblings and all of them have RLS.  His mother also has RLS.  One of his sisters Yvette is on pramipexole 0.5 mg 2 tablets a day.  Patient tried it for a few days and found it to be helpful\par \par Review of systems a complete review of systems is performed and is negative except as listed in HPI\par \par Social history patient drinks 1 cup of coffee a day.  He does not consume any alcohol does not smoke\par

## 2021-03-29 NOTE — PHYSICAL EXAM
[General Appearance - Alert] : alert [General Appearance - In No Acute Distress] : in no acute distress [Oriented To Time, Place, And Person] : oriented to person, place, and time [Impaired Insight] : insight and judgment were intact [Affect] : the affect was normal [Person] : oriented to person [Place] : oriented to place [Time] : oriented to time [Concentration Intact] : normal concentrating ability [Visual Intact] : visual attention was ~T not ~L decreased [Naming Objects] : no difficulty naming common objects [Repeating Phrases] : no difficulty repeating a phrase [Writing A Sentence] : no difficulty writing a sentence [Fluency] : fluency intact [Comprehension] : comprehension intact [Reading] : reading intact [Past History] : adequate knowledge of personal past history [Cranial Nerves Optic (II)] : visual acuity intact bilaterally,  visual fields full to confrontation, pupils equal round and reactive to light [Cranial Nerves Oculomotor (III)] : extraocular motion intact [Cranial Nerves Trigeminal (V)] : facial sensation intact symmetrically [Cranial Nerves Facial (VII)] : face symmetrical [Cranial Nerves Vestibulocochlear (VIII)] : hearing was intact bilaterally [Cranial Nerves Glossopharyngeal (IX)] : tongue and palate midline [Cranial Nerves Accessory (XI - Cranial And Spinal)] : head turning and shoulder shrug symmetric [Cranial Nerves Hypoglossal (XII)] : there was no tongue deviation with protrusion [Motor Tone] : muscle tone was normal in all four extremities [Motor Strength] : muscle strength was normal in all four extremities [No Muscle Atrophy] : normal bulk in all four extremities [Sensation Tactile Decrease] : light touch was intact [Balance] : balance was intact [Past-pointing] : there was no past-pointing [Tremor] : no tremor present [2+] : Ankle jerk left 2+ [Plantar Reflex Right Only] : normal on the right [Plantar Reflex Left Only] : normal on the left [Extraocular Movements] : extraocular movements were intact [Outer Ear] : the ears and nose were normal in appearance [Neck Appearance] : the appearance of the neck was normal [Abnormal Walk] : normal gait [] : no rash

## 2021-03-29 NOTE — DISCUSSION/SUMMARY
[FreeTextEntry1] : This is a 60-year-old right-handed male who presents with chief complaints of longstanding restless leg syndrome.  Patient is very bothered by his symptoms it keeps him from sleeping at night.  Symptoms are also present during daytime.\par Significant family history of restless leg\par Neurological exam is nonfocal\par \par Impression-RLS\par \par Plan\par Discussed different treatment options with dopamine agonists which included Neupro patch, increasing dose of pramipexole or trying ropinirole.  Patient states that he has tried ropinirole in the past without much benefit.  Gabapentin/Horizant may be another option.\par \par After above discussion we decided to increase dose of pramipexole by 0.125 mg every 3 to 4 days.  Side effects were discussed in detail.  Patient may also use 0.125 mg as needed up to 2 times a day during daytime in addition to the night dose.\par \par Labs-B12, folate, ferritin\par \par All questions were addressed and answered\par Follow-up in 1 month

## 2021-03-30 LAB
FERRITIN SERPL-MCNC: 226 NG/ML
FOLATE SERPL-MCNC: 19.4 NG/ML
VIT B12 SERPL-MCNC: 406 PG/ML

## 2021-03-31 ENCOUNTER — RX CHANGE (OUTPATIENT)
Age: 61
End: 2021-03-31

## 2021-04-26 ENCOUNTER — RX CHANGE (OUTPATIENT)
Age: 61
End: 2021-04-26

## 2021-04-26 RX ORDER — PRAMIPEXOLE DIHYDROCHLORIDE 0.25 MG/1
0.25 TABLET ORAL
Qty: 180 | Refills: 0 | Status: ACTIVE | COMMUNITY
Start: 2021-03-09 | End: 1900-01-01

## 2021-04-27 ENCOUNTER — APPOINTMENT (OUTPATIENT)
Dept: ORTHOPEDIC SURGERY | Facility: CLINIC | Age: 61
End: 2021-04-27
Payer: COMMERCIAL

## 2021-04-27 VITALS — HEART RATE: 82 BPM | DIASTOLIC BLOOD PRESSURE: 87 MMHG | SYSTOLIC BLOOD PRESSURE: 130 MMHG | TEMPERATURE: 96.2 F

## 2021-04-27 DIAGNOSIS — Z86.718 PERSONAL HISTORY OF OTHER VENOUS THROMBOSIS AND EMBOLISM: ICD-10-CM

## 2021-04-27 DIAGNOSIS — Z87.39 PERSONAL HISTORY OF OTHER DISEASES OF THE MUSCULOSKELETAL SYSTEM AND CONNECTIVE TISSUE: ICD-10-CM

## 2021-04-27 PROCEDURE — 99072 ADDL SUPL MATRL&STAF TM PHE: CPT

## 2021-04-27 PROCEDURE — 20610 DRAIN/INJ JOINT/BURSA W/O US: CPT | Mod: LT

## 2021-04-27 PROCEDURE — 99212 OFFICE O/P EST SF 10 MIN: CPT | Mod: 25

## 2021-04-27 RX ORDER — LORATADINE 5 MG/5 ML
SOLUTION, ORAL ORAL
Refills: 0 | Status: ACTIVE | COMMUNITY

## 2021-04-27 RX ADMIN — LIDOCAINE HYDROCHLORIDE 3 %: 10 INJECTION, SOLUTION INFILTRATION; PERINEURAL at 00:00

## 2021-04-27 RX ADMIN — METHYLPREDNISOLONE ACETATE 2 MG/ML: 40 INJECTION, SUSPENSION INTRALESIONAL; INTRAMUSCULAR; INTRASYNOVIAL; SOFT TISSUE at 00:00

## 2021-04-28 RX ORDER — LIDOCAINE HYDROCHLORIDE 10 MG/ML
1 INJECTION, SOLUTION INFILTRATION; PERINEURAL
Refills: 0 | Status: COMPLETED | OUTPATIENT
Start: 2021-04-27

## 2021-04-28 RX ORDER — METHYLPRED ACET/NACL,ISO-OS/PF 40 MG/ML
40 VIAL (ML) INJECTION
Qty: 1 | Refills: 0 | Status: COMPLETED | OUTPATIENT
Start: 2021-04-27

## 2021-05-03 ENCOUNTER — APPOINTMENT (OUTPATIENT)
Dept: NEUROLOGY | Facility: CLINIC | Age: 61
End: 2021-05-03

## 2021-06-16 ENCOUNTER — APPOINTMENT (OUTPATIENT)
Dept: INTERNAL MEDICINE | Facility: CLINIC | Age: 61
End: 2021-06-16

## 2021-08-13 ENCOUNTER — APPOINTMENT (OUTPATIENT)
Dept: GASTROENTEROLOGY | Facility: CLINIC | Age: 61
End: 2021-08-13

## 2021-09-02 ENCOUNTER — NON-APPOINTMENT (OUTPATIENT)
Age: 61
End: 2021-09-02

## 2021-11-12 ENCOUNTER — OUTPATIENT (OUTPATIENT)
Dept: OUTPATIENT SERVICES | Facility: HOSPITAL | Age: 61
LOS: 1 days | End: 2021-11-12
Payer: COMMERCIAL

## 2021-11-12 VITALS
TEMPERATURE: 99 F | HEART RATE: 89 BPM | SYSTOLIC BLOOD PRESSURE: 136 MMHG | RESPIRATION RATE: 16 BRPM | HEIGHT: 70 IN | WEIGHT: 274.92 LBS | OXYGEN SATURATION: 97 % | DIASTOLIC BLOOD PRESSURE: 92 MMHG

## 2021-11-12 DIAGNOSIS — Z96.651 PRESENCE OF RIGHT ARTIFICIAL KNEE JOINT: Chronic | ICD-10-CM

## 2021-11-12 DIAGNOSIS — Z87.19 PERSONAL HISTORY OF OTHER DISEASES OF THE DIGESTIVE SYSTEM: ICD-10-CM

## 2021-11-12 DIAGNOSIS — K21.00 GASTRO-ESOPHAGEAL REFLUX DISEASE WITH ESOPHAGITIS, WITHOUT BLEEDING: ICD-10-CM

## 2021-11-12 DIAGNOSIS — Z98.890 OTHER SPECIFIED POSTPROCEDURAL STATES: Chronic | ICD-10-CM

## 2021-11-12 DIAGNOSIS — G47.33 OBSTRUCTIVE SLEEP APNEA (ADULT) (PEDIATRIC): ICD-10-CM

## 2021-11-12 DIAGNOSIS — I26.99 OTHER PULMONARY EMBOLISM WITHOUT ACUTE COR PULMONALE: ICD-10-CM

## 2021-11-12 LAB
ANION GAP SERPL CALC-SCNC: 11 MMOL/L — SIGNIFICANT CHANGE UP (ref 7–14)
BUN SERPL-MCNC: 23 MG/DL — SIGNIFICANT CHANGE UP (ref 7–23)
CALCIUM SERPL-MCNC: 10 MG/DL — SIGNIFICANT CHANGE UP (ref 8.4–10.5)
CHLORIDE SERPL-SCNC: 104 MMOL/L — SIGNIFICANT CHANGE UP (ref 98–107)
CO2 SERPL-SCNC: 26 MMOL/L — SIGNIFICANT CHANGE UP (ref 22–31)
CREAT SERPL-MCNC: 0.98 MG/DL — SIGNIFICANT CHANGE UP (ref 0.5–1.3)
GLUCOSE SERPL-MCNC: 96 MG/DL — SIGNIFICANT CHANGE UP (ref 70–99)
HCT VFR BLD CALC: 46.3 % — SIGNIFICANT CHANGE UP (ref 39–50)
HGB BLD-MCNC: 16.1 G/DL — SIGNIFICANT CHANGE UP (ref 13–17)
MCHC RBC-ENTMCNC: 31.6 PG — SIGNIFICANT CHANGE UP (ref 27–34)
MCHC RBC-ENTMCNC: 34.8 GM/DL — SIGNIFICANT CHANGE UP (ref 32–36)
MCV RBC AUTO: 91 FL — SIGNIFICANT CHANGE UP (ref 80–100)
NRBC # BLD: 0 /100 WBCS — SIGNIFICANT CHANGE UP
NRBC # FLD: 0 K/UL — SIGNIFICANT CHANGE UP
PLATELET # BLD AUTO: 220 K/UL — SIGNIFICANT CHANGE UP (ref 150–400)
POTASSIUM SERPL-MCNC: 4.4 MMOL/L — SIGNIFICANT CHANGE UP (ref 3.5–5.3)
POTASSIUM SERPL-SCNC: 4.4 MMOL/L — SIGNIFICANT CHANGE UP (ref 3.5–5.3)
RBC # BLD: 5.09 M/UL — SIGNIFICANT CHANGE UP (ref 4.2–5.8)
RBC # FLD: 13 % — SIGNIFICANT CHANGE UP (ref 10.3–14.5)
SODIUM SERPL-SCNC: 141 MMOL/L — SIGNIFICANT CHANGE UP (ref 135–145)
WBC # BLD: 8.31 K/UL — SIGNIFICANT CHANGE UP (ref 3.8–10.5)
WBC # FLD AUTO: 8.31 K/UL — SIGNIFICANT CHANGE UP (ref 3.8–10.5)

## 2021-11-12 PROCEDURE — 93010 ELECTROCARDIOGRAM REPORT: CPT

## 2021-11-12 RX ORDER — SODIUM CHLORIDE 9 MG/ML
1000 INJECTION, SOLUTION INTRAVENOUS
Refills: 0 | Status: DISCONTINUED | OUTPATIENT
Start: 2021-12-06 | End: 2021-12-20

## 2021-11-12 NOTE — H&P PST ADULT - PROBLEM SELECTOR PLAN 1
Pt is scheduled for gastroendoduodenoscopy on 12/6/21.  Verbal and written pre op instructions reviewed with patient and pt able to verbalize understanding.   Pt instructed to follow surgeon's guidelines regarding COVID testing preop.   Pt instructed to take nexium AM of surgery with a sip of water, pt able to verbalize understanding. Pt is scheduled for gastroendoduodenoscopy on 12/6/21.  Verbal and written pre op instructions reviewed with patient and pt able to verbalize understanding.   Pt instructed to follow surgeon's guidelines regarding COVID testing preop.   Pt instructed to take Nexium AM of surgery with a sip of water, pt able to verbalize understanding.

## 2021-11-12 NOTE — H&P PST ADULT - HISTORY OF PRESENT ILLNESS
61 yo male with preop dx. barnes's esophagus presents to pre surgical testing.  Pt reports he is due for surveillance testing for Barnes's esophagus and complains of abdominal bloating.  Pt is scheduled for gastroendoduodenoscopy.

## 2021-11-12 NOTE — H&P PST ADULT - NSICDXPASTSURGICALHX_GEN_ALL_CORE_FT
PAST SURGICAL HISTORY:  History of laminectomy 2000 L4-L5    History of lithotripsy     S/P total knee replacement, right 12/2020
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
## 2021-11-30 ENCOUNTER — APPOINTMENT (OUTPATIENT)
Dept: GASTROENTEROLOGY | Facility: CLINIC | Age: 61
End: 2021-11-30
Payer: COMMERCIAL

## 2021-11-30 VITALS
OXYGEN SATURATION: 98 % | TEMPERATURE: 97 F | DIASTOLIC BLOOD PRESSURE: 78 MMHG | HEIGHT: 69 IN | SYSTOLIC BLOOD PRESSURE: 124 MMHG | HEART RATE: 83 BPM

## 2021-11-30 DIAGNOSIS — R19.4 CHANGE IN BOWEL HABIT: ICD-10-CM

## 2021-11-30 PROBLEM — I82.409 ACUTE EMBOLISM AND THROMBOSIS OF UNSPECIFIED DEEP VEINS OF UNSPECIFIED LOWER EXTREMITY: Chronic | Status: ACTIVE | Noted: 2020-12-17

## 2021-11-30 PROBLEM — J69.0 PNEUMONITIS DUE TO INHALATION OF FOOD AND VOMIT: Chronic | Status: ACTIVE | Noted: 2021-11-12

## 2021-11-30 PROBLEM — G47.33 OBSTRUCTIVE SLEEP APNEA (ADULT) (PEDIATRIC): Chronic | Status: ACTIVE | Noted: 2018-05-04

## 2021-11-30 PROCEDURE — 99214 OFFICE O/P EST MOD 30 MIN: CPT

## 2021-11-30 RX ORDER — PEG-3350, SODIUM SULFATE, SODIUM CHLORIDE, POTASSIUM CHLORIDE, SODIUM ASCORBATE AND ASCORBIC ACID 7.5-2.691G
100 KIT ORAL
Qty: 1 | Refills: 0 | Status: ACTIVE | COMMUNITY
Start: 2021-11-30 | End: 1900-01-01

## 2021-12-03 ENCOUNTER — APPOINTMENT (OUTPATIENT)
Dept: DISASTER EMERGENCY | Facility: CLINIC | Age: 61
End: 2021-12-03

## 2021-12-03 NOTE — ASU PATIENT PROFILE, ADULT - FALL HARM RISK - UNIVERSAL INTERVENTIONS
Bed in lowest position, wheels locked, appropriate side rails in place/Call bell, personal items and telephone in reach/Instruct patient to call for assistance before getting out of bed or chair/Non-slip footwear when patient is out of bed/[place] to call system/Physically safe environment - no spills, clutter or unnecessary equipment/Purposeful Proactive Rounding/Room/bathroom lighting operational, light cord in reach

## 2021-12-05 LAB — SARS-COV-2 N GENE NPH QL NAA+PROBE: NOT DETECTED

## 2021-12-05 NOTE — PHYSICAL EXAM
[General Appearance - Alert] : alert [General Appearance - In No Acute Distress] : in no acute distress [Sclera] : the sclera and conjunctiva were normal [Extraocular Movements] : extraocular movements were intact [Strabismus] : no strabismus was seen [Outer Ear] : the ears and nose were normal in appearance [Examination Of The Oral Cavity] : the lips and gums were normal [Oropharynx] : the oropharynx was normal [Neck Appearance] : the appearance of the neck was normal [Neck Cervical Mass (___cm)] : no neck mass was observed [Thyroid Diffuse Enlargement] : the thyroid was not enlarged [Thyroid Nodule] : there were no palpable thyroid nodules [] : no respiratory distress [Respiration, Rhythm And Depth] : normal respiratory rhythm and effort [Exaggerated Use Of Accessory Muscles For Inspiration] : no accessory muscle use [Auscultation Breath Sounds / Voice Sounds] : lungs were clear to auscultation bilaterally [Heart Rate And Rhythm] : heart rate was normal and rhythm regular [Heart Sounds] : normal S1 and S2 [Murmurs] : no murmurs [Edema] : there was no peripheral edema [Bowel Sounds] : normal bowel sounds [Abdomen Soft] : soft [Abdomen Tenderness] : non-tender [Abdomen Mass (___ Cm)] : no abdominal mass palpated [No Focal Deficits] : no focal deficits [FreeTextEntry1] : aox3 [Impaired Insight] : insight and judgment were intact [Affect] : the affect was normal

## 2021-12-05 NOTE — HISTORY OF PRESENT ILLNESS
[FreeTextEntry1] : Follow up: 8/2020\par Plan after last visit:\par History of colon polyps (V12.72) (Z86.010)\par Obesity (278.00) (E66.9)\par Gill's esophagus with low grade dysplasia (530.85) (K22.710)\par Hiatal hernia (553.3) (K44.9)\par Gastroesophageal reflux disease with esophagitis (530.11) (K21.0)\par \par 58 yo M pmh obesity, h/o HP s/p eradication, GERD c/b SSBE with LGD s/p Ablation with Chandu Stewart MD who presents now for follow up. Overall, he is doing well. Still some bloating but does not wish to make dietary/lifestyle changes. Advised that response may be more limited without them, but can try probiotic at this time. Has EGD set up with Pat in 10/2020 for surveillance of BE. Will remain on PPI give SSBE.\par \par Impression:\par 1) Bloating\par 2) GERD c/b SSBE with LGD s/p RFA x 1\par 3) Colon Polyps\par 4) Obesity\par 5) H/o HP s/p eradication\par \par Plan:\par 1) Probiotic daily\par 2) Advised on Low FODMAP diet\par 3) If no response, then will need SIBO breath test\par 4) C/w PPI (off for the above tests)\par 5) Colon 2021\par 6) EGD with AT in 10/2020\par 7) All discussed at length. All questions answered. Plan agreed upon. \par 8) RV 6 months. \par \par ----------------------------------------------------------\par \par Currently:\par \par SSBE with LGD\par s/p RFA x 1\par Due for surveillance at 1 year -> has f/u scheduled for next week\par Still on Nexium\par No dysphagia, weight loss\par No new tobacco\par Weight stable but still obesity\par \par GERD - minimal symptoms; on PPI\par \par Colon Polyps\par Due for surveillance\par \par Obesity:\par Similar to wife, struggling to lose weight\par Also interested in referral to medical obesity center\par \par Bloating:\par Still persistent\par Some constipation and some distension\par No n/v\par MInimal abd pain\par Does feel its distended, though tough to say given obesity\par Probiotic of limited benefit\par Never had breath test

## 2021-12-05 NOTE — ASSESSMENT
[FreeTextEntry1] : 59 yo M pmh obesity, h/o HP s/p eradication, GERD c/b SSBE with LGD s/p Ablation with Chandu Stewart MD and is due for 1 year surveillance (negative on last surveillance) who presents now for follow up. Still doing well. Repeat EGD next week for surveillance.  If negative, will transfer back to my own care for barretts surveillance.  Due for colonoscopy, will schedule on separate day per his request.  Bloating still bothering patient intermittently, unclear if associated with decreased stools.  Has been on long term PPI, so SIBO is also possible.\par \par Impression:\par 1) Bloating - persistent\par 2) GERD c/b SSBE with LGD s/p RFA x 1 -> negative dysplasia on last EGD\par 3) Colon Polyps - due for colon\par 4) Obesity\par 5) H/o HP s/p eradication\par \par Plan:\par 1) C/w Probiotic intermittently - has helped a bit with bloating\par 2) Lifelong PPI\par 3) SIBO breath test\par 4) Fiber for constipation, altered bowels\par 5) EGD with Pat\par 6) Colonoscopy with Juice\par 7) Referral to medical obesity center\par 8) All discussed at length. All questions answered. Plan agreed upon. \par 9) RV 3 months

## 2021-12-06 ENCOUNTER — RESULT REVIEW (OUTPATIENT)
Age: 61
End: 2021-12-06

## 2021-12-06 ENCOUNTER — OUTPATIENT (OUTPATIENT)
Dept: OUTPATIENT SERVICES | Facility: HOSPITAL | Age: 61
LOS: 1 days | Discharge: HOME | End: 2021-12-06
Payer: COMMERCIAL

## 2021-12-06 ENCOUNTER — APPOINTMENT (OUTPATIENT)
Dept: GASTROENTEROLOGY | Facility: HOSPITAL | Age: 61
End: 2021-12-06

## 2021-12-06 VITALS
TEMPERATURE: 98 F | WEIGHT: 270.07 LBS | HEIGHT: 69 IN | HEART RATE: 87 BPM | RESPIRATION RATE: 20 BRPM | DIASTOLIC BLOOD PRESSURE: 80 MMHG | OXYGEN SATURATION: 94 % | SYSTOLIC BLOOD PRESSURE: 106 MMHG

## 2021-12-06 VITALS — RESPIRATION RATE: 18 BRPM | HEART RATE: 76 BPM | SYSTOLIC BLOOD PRESSURE: 113 MMHG | DIASTOLIC BLOOD PRESSURE: 81 MMHG

## 2021-12-06 DIAGNOSIS — Z98.890 OTHER SPECIFIED POSTPROCEDURAL STATES: Chronic | ICD-10-CM

## 2021-12-06 DIAGNOSIS — K21.00 GASTRO-ESOPHAGEAL REFLUX DISEASE WITH ESOPHAGITIS, WITHOUT BLEEDING: ICD-10-CM

## 2021-12-06 DIAGNOSIS — Z96.651 PRESENCE OF RIGHT ARTIFICIAL KNEE JOINT: Chronic | ICD-10-CM

## 2021-12-06 PROCEDURE — 43239 EGD BIOPSY SINGLE/MULTIPLE: CPT | Mod: GC

## 2021-12-06 PROCEDURE — 88305 TISSUE EXAM BY PATHOLOGIST: CPT | Mod: 26

## 2021-12-06 RX ORDER — SODIUM CHLORIDE 9 MG/ML
500 INJECTION, SOLUTION INTRAVENOUS
Refills: 0 | Status: DISCONTINUED | OUTPATIENT
Start: 2021-12-06 | End: 2021-12-20

## 2022-02-23 ENCOUNTER — APPOINTMENT (OUTPATIENT)
Dept: GASTROENTEROLOGY | Facility: HOSPITAL | Age: 62
End: 2022-02-23

## 2022-02-23 ENCOUNTER — OUTPATIENT (OUTPATIENT)
Dept: OUTPATIENT SERVICES | Facility: HOSPITAL | Age: 62
LOS: 1 days | End: 2022-02-23
Payer: COMMERCIAL

## 2022-02-23 ENCOUNTER — RESULT REVIEW (OUTPATIENT)
Age: 62
End: 2022-02-23

## 2022-02-23 VITALS
TEMPERATURE: 98 F | RESPIRATION RATE: 18 BRPM | OXYGEN SATURATION: 96 % | HEART RATE: 94 BPM | HEIGHT: 69 IN | WEIGHT: 265 LBS | DIASTOLIC BLOOD PRESSURE: 77 MMHG | SYSTOLIC BLOOD PRESSURE: 120 MMHG

## 2022-02-23 VITALS
OXYGEN SATURATION: 96 % | DIASTOLIC BLOOD PRESSURE: 67 MMHG | RESPIRATION RATE: 16 BRPM | HEART RATE: 75 BPM | SYSTOLIC BLOOD PRESSURE: 123 MMHG

## 2022-02-23 DIAGNOSIS — Z98.890 OTHER SPECIFIED POSTPROCEDURAL STATES: Chronic | ICD-10-CM

## 2022-02-23 DIAGNOSIS — Z96.651 PRESENCE OF RIGHT ARTIFICIAL KNEE JOINT: Chronic | ICD-10-CM

## 2022-02-23 DIAGNOSIS — D36.9 BENIGN NEOPLASM, UNSPECIFIED SITE: ICD-10-CM

## 2022-02-23 PROCEDURE — 45380 COLONOSCOPY AND BIOPSY: CPT | Mod: PT

## 2022-02-23 PROCEDURE — 45380 COLONOSCOPY AND BIOPSY: CPT

## 2022-02-23 PROCEDURE — 88305 TISSUE EXAM BY PATHOLOGIST: CPT

## 2022-02-23 PROCEDURE — 88305 TISSUE EXAM BY PATHOLOGIST: CPT | Mod: 26

## 2022-02-23 DEVICE — CATH THERMODIL PACE 7.5FR: Type: IMPLANTABLE DEVICE | Status: FUNCTIONAL

## 2022-02-23 DEVICE — NET RETRV ROT ROTH 2.5MMX230CM: Type: IMPLANTABLE DEVICE | Status: FUNCTIONAL

## 2022-02-23 DEVICE — KIT A-LINE 1LUM 20G X 12CM SAFE KIT: Type: IMPLANTABLE DEVICE | Status: FUNCTIONAL

## 2022-02-23 DEVICE — KIT CVC 2LUM MAC 9FR CHG: Type: IMPLANTABLE DEVICE | Status: FUNCTIONAL

## 2022-02-23 RX ORDER — ONDANSETRON 8 MG/1
4 TABLET, FILM COATED ORAL ONCE
Refills: 0 | Status: DISCONTINUED | OUTPATIENT
Start: 2022-02-23 | End: 2022-03-09

## 2022-02-23 RX ORDER — SODIUM CHLORIDE 9 MG/ML
500 INJECTION INTRAMUSCULAR; INTRAVENOUS; SUBCUTANEOUS
Refills: 0 | Status: COMPLETED | OUTPATIENT
Start: 2022-02-23 | End: 2022-02-23

## 2022-02-23 RX ADMIN — SODIUM CHLORIDE 30 MILLILITER(S): 9 INJECTION INTRAMUSCULAR; INTRAVENOUS; SUBCUTANEOUS at 08:52

## 2022-02-23 NOTE — ASU PREOP CHECKLIST - BLOOD AVAILABLE
[address]  [address]                    EEG STUDY REPORT              
_______________________________________________________________________________
 
Name:       YESSICA KNIGHT                Room:           [address] 
M.RJoaquín#:  [id number]      Account #:      [id number]  
Admission:  08/30/20     Attend Phys:    Khushboo Langford MD 
Discharge:  09/01/20     Date of Birth:  [birth year]  
         Report #: [id number]
                                                                     [id number]  
_______________________________________________________________________________
THIS REPORT FOR:  //name//                      
 
CC: Khushboo English
 
DATE OF SERVICE:  09/01/2020
 
 
This patient's EEG was done by placing the electrode by standard 10-20 system of
electrode placement.  Both referential and sequential montages were used for
recording.  Background activity in this patient's EEG is about 9 Hz and 30
microvolts.  The patient went to sleep that is associated with bilateral slowing
and vertex sharp waves.  Throughout the record, no active epileptiform activity
was noticed.
 
IMPRESSION:  This patient's EEG is within normal limit.
 
Thank you very much for this referral.
 
 
 
 
 
 
 
 
 
 
 
 
 
 
 
 
 
 
 
 
 
 
 
 
 
 
<ELECTRONICALLY SIGNED>
                                        By:  Zane Gooden MD         
09/02/20     1208
D: 09/01/20 1713_______________________________________
T: 09/01/20 1754Pleo Gooden MD            /nt
n/a

## 2022-02-23 NOTE — ASU PATIENT PROFILE, ADULT - FALL HARM RISK - UNIVERSAL INTERVENTIONS
Bed in lowest position, wheels locked, appropriate side rails in place/Call bell, personal items and telephone in reach/Instruct patient to call for assistance before getting out of bed or chair/Non-slip footwear when patient is out of bed/[place] to call system/Physically safe environment - no spills, clutter or unnecessary equipment/Purposeful Proactive Rounding/Room/bathroom lighting operational, light cord in reach
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
## 2022-02-24 LAB — SURGICAL PATHOLOGY STUDY: SIGNIFICANT CHANGE UP

## 2022-03-02 ENCOUNTER — NON-APPOINTMENT (OUTPATIENT)
Age: 62
End: 2022-03-02

## 2022-03-28 ENCOUNTER — APPOINTMENT (OUTPATIENT)
Dept: INTERNAL MEDICINE | Facility: CLINIC | Age: 62
End: 2022-03-28
Payer: COMMERCIAL

## 2022-03-28 ENCOUNTER — NON-APPOINTMENT (OUTPATIENT)
Age: 62
End: 2022-03-28

## 2022-03-28 VITALS
HEIGHT: 69 IN | BODY MASS INDEX: 42.51 KG/M2 | DIASTOLIC BLOOD PRESSURE: 80 MMHG | WEIGHT: 287 LBS | SYSTOLIC BLOOD PRESSURE: 126 MMHG | RESPIRATION RATE: 16 BRPM | OXYGEN SATURATION: 97 % | HEART RATE: 74 BPM

## 2022-03-28 DIAGNOSIS — Z00.00 ENCOUNTER FOR GENERAL ADULT MEDICAL EXAMINATION W/OUT ABNORMAL FINDINGS: ICD-10-CM

## 2022-03-28 DIAGNOSIS — Z87.891 PERSONAL HISTORY OF NICOTINE DEPENDENCE: ICD-10-CM

## 2022-03-28 PROCEDURE — 99396 PREV VISIT EST AGE 40-64: CPT | Mod: 25

## 2022-03-28 PROCEDURE — 93000 ELECTROCARDIOGRAM COMPLETE: CPT

## 2022-03-28 NOTE — HEALTH RISK ASSESSMENT
[Former] : Former [Yes] : Yes [Monthly or less (1 pt)] : Monthly or less (1 point) [1 or 2 (0 pts)] : 1 or 2 (0 points) [Never (0 pts)] : Never (0 points) [No] : In the past 12 months have you used drugs other than those required for medical reasons? No [No falls in past year] : Patient reported no falls in the past year [0] : 2) Feeling down, depressed, or hopeless: Not at all (0) [PHQ-2 Negative - No further assessment needed] : PHQ-2 Negative - No further assessment needed [None] : None [With Family] : lives with family [Employed] : employed [] :  [Feels Safe at Home] : Feels safe at home [Fully functional (bathing, dressing, toileting, transferring, walking, feeding)] : Fully functional (bathing, dressing, toileting, transferring, walking, feeding) [Fully functional (using the telephone, shopping, preparing meals, housekeeping, doing laundry, using] : Fully functional and needs no help or supervision to perform IADLs (using the telephone, shopping, preparing meals, housekeeping, doing laundry, using transportation, managing medications and managing finances) [Smoke Detector] : smoke detector [Carbon Monoxide Detector] : carbon monoxide detector [Seat Belt] :  uses seat belt [Sunscreen] : uses sunscreen [Audit-CScore] : 1 [CXS3Ccfia] : 0 [Change in mental status noted] : No change in mental status noted [High Risk Behavior] : no high risk behavior [Reports changes in hearing] : Reports no changes in hearing [Reports changes in vision] : Reports no changes in vision [Reports changes in dental health] : Reports no changes in dental health
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
## 2022-03-28 NOTE — PHYSICAL EXAM
[No Acute Distress] : no acute distress [Well Nourished] : well nourished [Well Developed] : well developed [Well-Appearing] : well-appearing [Normal Sclera/Conjunctiva] : normal sclera/conjunctiva [PERRL] : pupils equal round and reactive to light [EOMI] : extraocular movements intact [Normal Outer Ear/Nose] : the outer ears and nose were normal in appearance [Normal Oropharynx] : the oropharynx was normal [No JVD] : no jugular venous distention [No Lymphadenopathy] : no lymphadenopathy [Supple] : supple [Thyroid Normal, No Nodules] : the thyroid was normal and there were no nodules present [No Respiratory Distress] : no respiratory distress  [No Accessory Muscle Use] : no accessory muscle use [Clear to Auscultation] : lungs were clear to auscultation bilaterally [Normal Rate] : normal rate  [Regular Rhythm] : with a regular rhythm [No Murmur] : no murmur heard [Normal S1, S2] : normal S1 and S2 [No Carotid Bruits] : no carotid bruits [No Abdominal Bruit] : a ~M bruit was not heard ~T in the abdomen [Pedal Pulses Present] : the pedal pulses are present [No Palpable Aorta] : no palpable aorta [No Edema] : there was no peripheral edema [Soft] : abdomen soft [Non Tender] : non-tender [Non-distended] : non-distended [No Masses] : no abdominal mass palpated [No HSM] : no HSM [Normal Bowel Sounds] : normal bowel sounds [Normal Posterior Cervical Nodes] : no posterior cervical lymphadenopathy [Normal Anterior Cervical Nodes] : no anterior cervical lymphadenopathy [No CVA Tenderness] : no CVA  tenderness [No Spinal Tenderness] : no spinal tenderness [No Joint Swelling] : no joint swelling [Grossly Normal Strength/Tone] : grossly normal strength/tone [No Rash] : no rash [Coordination Grossly Intact] : coordination grossly intact [No Focal Deficits] : no focal deficits [Normal Gait] : normal gait [Speech Grossly Normal] : speech grossly normal [Memory Grossly Normal] : memory grossly normal [Normal Affect] : the affect was normal [Alert and Oriented x3] : oriented to person, place, and time [Normal Insight/Judgement] : insight and judgment were intact [Normal Mood] : the mood was normal [Normal Voice/Communication] : normal voice/communication [de-identified] : obese

## 2022-03-28 NOTE — REVIEW OF SYSTEMS
[Joint Pain] : joint pain [Negative] : Heme/Lymph [Fatigue] : fatigue [FreeTextEntry9] : restless legs/discomfort

## 2022-03-28 NOTE — COUNSELING
[Potential consequences of obesity discussed] : Potential consequences of obesity discussed [Benefits of weight loss discussed] : Benefits of weight loss discussed [Weigh Self Weekly] : weigh self weekly [Encouraged to increase physical activity] : Encouraged to increase physical activity [None] : None [Good understanding] : Patient has a good understanding of lifestyle changes and steps needed to achieve self management goal

## 2022-03-28 NOTE — PLAN
[FreeTextEntry1] : Discussed with the patient health care maintenance.  \par Discussed age and condition appropriate vaccinations.  \par Discussed age appropriate cancer screening testing as indicated, including colon cancer screening/colonoscopy, prostate cancer screening/PSA testing, testicular cancer screening/self exam and skin cancer screening.  \par Discussed protection from the sun.  \par Discussed healthy diet, exercise and weight control for health.\par Discussed healthy habits including abstaining from smoking and drugs and abstention/moderation with alcohol.\par Discussed routine regular ophthalmology and dental follow up.\par Routine labs discussed with the patient and sent. \par EKG: NSR@74, WNL

## 2022-03-30 LAB
25(OH)D3 SERPL-MCNC: 20.1 NG/ML
ALBUMIN SERPL ELPH-MCNC: 5 G/DL
ALP BLD-CCNC: 51 U/L
ALT SERPL-CCNC: 45 U/L
ANION GAP SERPL CALC-SCNC: 13 MMOL/L
AST SERPL-CCNC: 28 U/L
BASOPHILS # BLD AUTO: 0.09 K/UL
BASOPHILS NFR BLD AUTO: 1.3 %
BILIRUB SERPL-MCNC: 0.5 MG/DL
BUN SERPL-MCNC: 19 MG/DL
CALCIUM SERPL-MCNC: 10 MG/DL
CHLORIDE SERPL-SCNC: 104 MMOL/L
CHOLEST SERPL-MCNC: 209 MG/DL
CO2 SERPL-SCNC: 23 MMOL/L
CREAT SERPL-MCNC: 0.88 MG/DL
EGFR: 97 ML/MIN/1.73M2
EOSINOPHIL # BLD AUTO: 0.16 K/UL
EOSINOPHIL NFR BLD AUTO: 2.3 %
ESTIMATED AVERAGE GLUCOSE: 123 MG/DL
GLUCOSE SERPL-MCNC: 115 MG/DL
HBA1C MFR BLD HPLC: 5.9 %
HCT VFR BLD CALC: 48.8 %
HDLC SERPL-MCNC: 35 MG/DL
HGB BLD-MCNC: 15.9 G/DL
IMM GRANULOCYTES NFR BLD AUTO: 1 %
LDLC SERPL CALC-MCNC: 125 MG/DL
LYMPHOCYTES # BLD AUTO: 1.84 K/UL
LYMPHOCYTES NFR BLD AUTO: 26.1 %
MAN DIFF?: NORMAL
MCHC RBC-ENTMCNC: 31 PG
MCHC RBC-ENTMCNC: 32.6 GM/DL
MCV RBC AUTO: 95.1 FL
MONOCYTES # BLD AUTO: 0.6 K/UL
MONOCYTES NFR BLD AUTO: 8.5 %
NEUTROPHILS # BLD AUTO: 4.28 K/UL
NEUTROPHILS NFR BLD AUTO: 60.8 %
NONHDLC SERPL-MCNC: 174 MG/DL
PLATELET # BLD AUTO: 205 K/UL
POTASSIUM SERPL-SCNC: 4.7 MMOL/L
PROT SERPL-MCNC: 7.6 G/DL
PSA SERPL-MCNC: 0.4 NG/ML
RBC # BLD: 5.13 M/UL
RBC # FLD: 13.6 %
SODIUM SERPL-SCNC: 141 MMOL/L
TRIGL SERPL-MCNC: 244 MG/DL
TSH SERPL-ACNC: 1.78 UIU/ML
WBC # FLD AUTO: 7.04 K/UL

## 2022-06-21 ENCOUNTER — APPOINTMENT (OUTPATIENT)
Dept: GASTROENTEROLOGY | Facility: CLINIC | Age: 62
End: 2022-06-21

## 2022-06-21 VITALS
BODY MASS INDEX: 40.43 KG/M2 | SYSTOLIC BLOOD PRESSURE: 125 MMHG | HEART RATE: 76 BPM | OXYGEN SATURATION: 97 % | DIASTOLIC BLOOD PRESSURE: 80 MMHG | WEIGHT: 273 LBS | HEIGHT: 69 IN | TEMPERATURE: 98 F

## 2022-06-21 PROCEDURE — 99215 OFFICE O/P EST HI 40 MIN: CPT

## 2022-06-21 RX ORDER — ESOMEPRAZOLE MAGNESIUM 40 MG/1
40 CAPSULE, DELAYED RELEASE ORAL
Qty: 30 | Refills: 6 | Status: ACTIVE | COMMUNITY
Start: 2022-06-21 | End: 1900-01-01

## 2022-06-21 RX ORDER — ESOMEPRAZOLE MAGNESIUM 20 MG/1
20 CAPSULE, DELAYED RELEASE ORAL DAILY
Refills: 0 | Status: ACTIVE | COMMUNITY

## 2022-07-18 NOTE — PHYSICAL EXAM
[General Appearance - Alert] : alert [General Appearance - In No Acute Distress] : in no acute distress [Sclera] : the sclera and conjunctiva were normal [Extraocular Movements] : extraocular movements were intact [Strabismus] : no strabismus was seen [Outer Ear] : the ears and nose were normal in appearance [Examination Of The Oral Cavity] : the lips and gums were normal [Oropharynx] : the oropharynx was normal [Neck Appearance] : the appearance of the neck was normal [Neck Cervical Mass (___cm)] : no neck mass was observed [Thyroid Diffuse Enlargement] : the thyroid was not enlarged [Thyroid Nodule] : there were no palpable thyroid nodules [] : no respiratory distress [Respiration, Rhythm And Depth] : normal respiratory rhythm and effort [Exaggerated Use Of Accessory Muscles For Inspiration] : no accessory muscle use [Auscultation Breath Sounds / Voice Sounds] : lungs were clear to auscultation bilaterally [Heart Rate And Rhythm] : heart rate was normal and rhythm regular [Heart Sounds] : normal S1 and S2 [Murmurs] : no murmurs [Bowel Sounds] : normal bowel sounds [Abdomen Soft] : soft [Abdomen Tenderness] : non-tender [Abdomen Mass (___ Cm)] : no abdominal mass palpated [No Focal Deficits] : no focal deficits [FreeTextEntry1] : aox3 [Impaired Insight] : insight and judgment were intact [Affect] : the affect was normal

## 2022-07-18 NOTE — ASSESSMENT
[FreeTextEntry1] : 62 yo M pmh obesity, h/o HP s/p eradication, GERD c/b SSBE with LGD s/p Ablation with Chandu Stewart MD and is due for 1 year surveillance (negative x2 surveillance), colon polyps, who presents now for follow up. Overall, doing okay from GI Standpoint.  UTD Barretts, Polyp surveillances.  BRBPR in the setting of recent constipation, but now fully resolved.  Major residual GI complaint is bloating, which appears to be mostly dietary related. Given long standing PPI will r/o SIBO.  From health standpoint, morbid obesity is of the biggest concern.  He feels ready to try medical interventions and wants to work with a multi disciplinary team.\par \par Impression:\par 1) Bloating - persistent\par 2) GERD c/b SSBE with LGD s/p RFA x 1 -> negative dysplasia on last EGD - due 12/2022\par 3) Colon Polyps - repeat 2027\par 4) Morbid Obesity -> He is ready for change\par 5) H/o HP s/p eradication\par 6) BRBPR - resolved\par 7) Intermittent constipation\par \par Plan:\par 1) SIBO breath test\par 2) Lifelong PPI - risks, benefits, limitations reviewed\par 3) Miralax + Metamucil\par 4) Referral to [facility] team for SLP-1 and Obesity management.  Dietary, lifestyle counselling provided today.\par 5) EGD in 1 year with Pat as planned\par 6) Colon 2027\par 7) All discussed at length. All questions answered. Plan agreed upon. \par 8) RV 6 months

## 2022-07-18 NOTE — HISTORY OF PRESENT ILLNESS
[FreeTextEntry1] : Last visit: 11/2021\par Plan after last visit:\par Altered bowel habits (787.99) (R19.4)\par Gill's esophagus with low grade dysplasia (530.85) (K22.710)\par History of colon polyps (V12.72) (Z86.010)\par Obesity (278.00) (E66.9)\par Gastroesophageal reflux disease with esophagitis (530.11) (K21.00)\par Bloating symptom (787.3) (R14.0)\par \par 61 yo M pmh obesity, h/o HP s/p eradication, GERD c/b SSBE with LGD s/p Ablation with Cahndu Stewart MD and is due for 1 year surveillance (negative on last surveillance) who presents now for follow up. Still doing well. Repeat EGD next week for surveillance. If negative, will transfer back to my own care for barretts surveillance. Due for colonoscopy, will schedule on separate day per his request. Bloating still bothering patient intermittently, unclear if associated with decreased stools. Has been on long term PPI, so SIBO is also possible.\par \par Impression:\par 1) Bloating - persistent\par 2) GERD c/b SSBE with LGD s/p RFA x 1 -> negative dysplasia on last EGD\par 3) Colon Polyps - due for colon\par 4) Obesity\par 5) H/o HP s/p eradication\par \par Plan:\par 1) C/w Probiotic intermittently - has helped a bit with bloating\par 2) Lifelong PPI\par 3) SIBO breath test\par 4) Fiber for constipation, altered bowels\par 5) EGD with Pat\par 6) Colonoscopy with Juice\par 7) Referral to medical obesity center\par 8) All discussed at length. All questions answered. Plan agreed upon. \par 9) RV 3 months. \par -----------------------------------------------------------------------\par \par Since last visit:\par - Had EGD with Pat\par - Had Colon with Juice\par \par Currently:\par \par SSBE c/b LGB s/p RFA x1 -> negative bx 12/2021\par Due for repeat in 1 year\par No complaints\par No dysphagia, odynophagia, gerd\par On PPI\par No new tobacco, working on weight loss (see below)\par \par Colon Polyps:\par 5 year follow up 2/2027\par \par Obesity:\par Saw PCP - unclear if they want to start SLP1\par Wants to start SLP1 at this point\par Has DM, HL and comorbidities of obesity\par Is impacting QOL in terms of activity and joint aches\par Wants to work with Obesity center\par He feels that he is ready at this point, though he has mentioned this in past he feels he is ready to 'take the leap'\par \par Bloating:\par Still there and intermittently bothersome\par No true distension\par Intermittent constipation\par Unclear if the bloating overlaps with the constipation\par Has not tried much\par \par BRBPR \par Saw some blood on stool/tissue people\par Hard stools\par There was discomfort when came out\par Took Miralax/Metamucil and felt much better\par At this point - no pain on defecation, no hemorrhoids, no more blood, and constipation controlled\par \par \par -----------------------------------------------------------------\par Prior Workup:\par Colon 2/2022\par 4 mm polyp in descending colon s/p bx forcep\par \par Path:\par Final Diagnosis\par Colon, descending, polyp, biopsy\par - Hyperplastic polyp\par \par EGD 12/2021\par 2 cm HH, C0M2\par Otherwise normal EGD\par s/p bx and Brush\par \par Path:\par Final Diagnosis\par 1. Gastric, cardia, biopsy:\par \par - Gastric cardio-oxyntic mucosa with chronic inactive gastritis.\par - Squamocolumnar junction with chronic inactive inflammation.\par -  No morphologic evidence of Helicobacter microorganisms.\par       - Negative for intestinal metaplasia.\par \par 2. Esophagus, distal @ 37, biopsy:\par - Squamocolumnar junction with chronic inactive inflammation.\par -  No morphologic evidence of Helicobacter microorganisms.\par       - Negative for intestinal metaplasia.\par \par 3. Esophagus, distal @ 36, biopsy:\par - Gastric cardio-oxyntic mucosa with chronic inactive gastritis.\par -  No morphologic evidence of Helicobacter microorganisms.\par       - Negative for intestinal metaplasia.\par - Squamous epithelium with no significant diagnostic alterations.\par \par 4. Esophagus, distal @ 31, biopsy:\par - Squamocolumnar junction with chronic inactive inflammation.\par -  No morphologic evidence of Helicobacter microorganisms.\par       - Negative for intestinal metaplasia.\par Verified by: Marti Early MD\par \par \par \par Last PCP:\par Counseling\par \par Weight, Healthy Eating and Activity Counseling: \par Potential consequences of obesity discussed \par Benefits of weight loss discussed \par Encouraged to increase physical activity \par Plan To Achieve Goals: weigh self weekly \par Patient has a good understanding of disease, goals and obesity follow-up plan  \par \par Barriers to Goals: \par None \par Patient has a good understanding of lifestyle changes and steps needed to achieve self management goal

## 2022-08-01 NOTE — H&P PST ADULT - FUNCTIONAL SCREEN CURRENT LEVEL: COMMUNICATION, MLM
Boogie returns for f/u abdominal pain, constipation, and vomiting. History is provided by the patient and his parents  He has had issues with abdominal pain since January, along with nausea and headaches.    He also had a concussion in February, leading to headaches. This has resolved.  Has tried:  Seen in ED 3/5/22 - given enema. Recommended Miralax 7 capful cleanout, followed by miralax 1 capful daily x 2 weeks, Pepcid, Pepto Bismol Did not improve with avoiding milk.  Seen in April and started back on Miralax.  22:  tTG IgA and IgA normal. Stool H pylori and calprotectin done bu we did not get results.  Over the summer, he has only complained of abdominal pain with nausea if he has gone too long without eating or if he did not eat enough. Symptoms resolve with eating.   Eating well.  Denies heartburn, regurgitation and dysphagia.  Denies flatulence, belching or bloating.  Stooling 1-2x/day, bristol type 3-4, no straining or anal pain. Denies blood in stool.   --------------- PRIOR TESTIN22: KUB: scattered fecal material. 22: Head CT neg 3/5/22:  CMP nl x HCO3 18, ALT 38. WBC 17.0, Hgb 12.7, Plts 270, CRP 0.5, U/A neg KUB: Moderate volume fecal material without obstruction. 0 = understands/communicates without difficulty

## 2022-08-15 ENCOUNTER — APPOINTMENT (OUTPATIENT)
Dept: INTERNAL MEDICINE | Facility: CLINIC | Age: 62
End: 2022-08-15

## 2022-08-15 VITALS
OXYGEN SATURATION: 96 % | RESPIRATION RATE: 16 BRPM | HEIGHT: 69 IN | DIASTOLIC BLOOD PRESSURE: 78 MMHG | BODY MASS INDEX: 40.73 KG/M2 | SYSTOLIC BLOOD PRESSURE: 120 MMHG | HEART RATE: 77 BPM | WEIGHT: 275 LBS

## 2022-08-15 PROCEDURE — 36415 COLL VENOUS BLD VENIPUNCTURE: CPT

## 2022-08-15 PROCEDURE — 99214 OFFICE O/P EST MOD 30 MIN: CPT | Mod: 25

## 2022-08-15 RX ORDER — CLOTRIMAZOLE AND BETAMETHASONE DIPROPIONATE 10; .5 MG/G; MG/G
1-0.05 CREAM TOPICAL
Qty: 45 | Refills: 0 | Status: COMPLETED | COMMUNITY
Start: 2022-07-19

## 2022-08-15 RX ORDER — PREDNISONE 10 MG/1
10 TABLET ORAL
Qty: 10 | Refills: 0 | Status: COMPLETED | COMMUNITY
Start: 2022-05-20

## 2022-08-15 RX ORDER — SILDENAFIL 100 MG/1
100 TABLET, FILM COATED ORAL
Qty: 10 | Refills: 0 | Status: ACTIVE | COMMUNITY
Start: 2022-07-28

## 2022-08-15 RX ORDER — CEPHALEXIN 500 MG/1
500 CAPSULE ORAL
Qty: 21 | Refills: 0 | Status: COMPLETED | COMMUNITY
Start: 2022-05-20
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
## 2022-08-15 NOTE — PHYSICAL EXAM
[No Acute Distress] : no acute distress [Well Nourished] : well nourished [Well Developed] : well developed [Well-Appearing] : well-appearing [Normal Voice/Communication] : normal voice/communication [Normal Sclera/Conjunctiva] : normal sclera/conjunctiva [PERRL] : pupils equal round and reactive to light [EOMI] : extraocular movements intact [Normal Outer Ear/Nose] : the outer ears and nose were normal in appearance [Normal Oropharynx] : the oropharynx was normal [No JVD] : no jugular venous distention [No Lymphadenopathy] : no lymphadenopathy [Supple] : supple [Thyroid Normal, No Nodules] : the thyroid was normal and there were no nodules present [No Respiratory Distress] : no respiratory distress  [No Accessory Muscle Use] : no accessory muscle use [Clear to Auscultation] : lungs were clear to auscultation bilaterally [Normal Rate] : normal rate  [Regular Rhythm] : with a regular rhythm [Normal S1, S2] : normal S1 and S2 [No Murmur] : no murmur heard [No Carotid Bruits] : no carotid bruits [No Abdominal Bruit] : a ~M bruit was not heard ~T in the abdomen [Pedal Pulses Present] : the pedal pulses are present [No Edema] : there was no peripheral edema [No Palpable Aorta] : no palpable aorta [Soft] : abdomen soft [Non Tender] : non-tender [Non-distended] : non-distended [No Masses] : no abdominal mass palpated [No HSM] : no HSM [Normal Bowel Sounds] : normal bowel sounds [Normal Posterior Cervical Nodes] : no posterior cervical lymphadenopathy [Normal Anterior Cervical Nodes] : no anterior cervical lymphadenopathy [No CVA Tenderness] : no CVA  tenderness [No Spinal Tenderness] : no spinal tenderness [No Joint Swelling] : no joint swelling [Grossly Normal Strength/Tone] : grossly normal strength/tone [No Rash] : no rash [Coordination Grossly Intact] : coordination grossly intact [No Focal Deficits] : no focal deficits [Normal Gait] : normal gait [Speech Grossly Normal] : speech grossly normal [Memory Grossly Normal] : memory grossly normal [Normal Affect] : the affect was normal [Alert and Oriented x3] : oriented to person, place, and time [Normal Mood] : the mood was normal [Normal Insight/Judgement] : insight and judgment were intact [de-identified] : obese

## 2022-08-15 NOTE — REVIEW OF SYSTEMS
[Fatigue] : fatigue [Joint Pain] : joint pain [Negative] : Heme/Lymph [FreeTextEntry9] : restless legs/discomfort

## 2022-08-15 NOTE — HEALTH RISK ASSESSMENT
[Former] : Former [Yes] : Yes [Monthly or less (1 pt)] : Monthly or less (1 point) [1 or 2 (0 pts)] : 1 or 2 (0 points) [Never (0 pts)] : Never (0 points) [No] : In the past 12 months have you used drugs other than those required for medical reasons? No [0] : 2) Feeling down, depressed, or hopeless: Not at all (0) [Audit-CScore] : 1 [[id number]] : 0

## 2022-08-19 ENCOUNTER — APPOINTMENT (OUTPATIENT)
Dept: BARIATRICS/WEIGHT MGMT | Facility: CLINIC | Age: 62
End: 2022-08-19

## 2022-08-30 NOTE — H&P ADULT - PROBLEM SELECTOR PLAN 4
H/o knee OA. No pain currently  - Pain control with Tylenol as needed [Follow-Up Visit] : a follow-up pain management visit

## 2022-09-23 ENCOUNTER — APPOINTMENT (OUTPATIENT)
Dept: BARIATRICS/WEIGHT MGMT | Facility: CLINIC | Age: 62
End: 2022-09-23

## 2022-09-23 PROCEDURE — 99204 OFFICE O/P NEW MOD 45 MIN: CPT | Mod: 95

## 2022-09-23 PROCEDURE — 99214 OFFICE O/P EST MOD 30 MIN: CPT | Mod: 95

## 2022-09-24 NOTE — REASON FOR VISIT
[Home] : at home, [unfilled] , at the time of the visit. [Other Location: e.g. Home (Enter Location, City,State)___] : at [unfilled] [Spouse] : spouse [Initial Evaluation] : an initial evaluation [FreeTextEntry1] : obesity

## 2022-09-24 NOTE — HISTORY OF PRESENT ILLNESS
[FreeTextEntry1] : 62 yo man with obesity\par \par had 2 jobs, busy, difficult time losing weight because of time restraints.  has retired\par taking nexium for chronic GERD, better since taking that medication\par \par 5'9\par weight = 275 lbs\par \par SOCIAL:\par retired \par lives with wife who does most of the cooking\par drinks beer on occasion\par \par FOOD:\par "loves food", wife a good cook\par eating more plant based food recently, but prior would eat large, portions, snack, eat out and do so with wife\par \par PHYSICAL ACTIVITY:\par enjoys a lot of activity but has not been particularly active in general\par mainly walking when he is active\par \par SLEEP:\par has MARÍA - but now feels refrehsed when waking up\par \par Please refer to intake forms for complete weight and related history.\par \par \par \par

## 2022-09-24 NOTE — ASSESSMENT
[FreeTextEntry1] : BARIATRIC SURGERY HISTORY: none\par \par OBESITY COMORBIDITIES: MARÍA, pre-dm\par \par ANTI-OBESITY MEDICATIONS: none\par \par OBESITY MEDICATION SIDE EFFECTS: n/a\par \par \par \par Recommend the following:\par \par reviewed metabolic labs\par whole foods based eating strategy limiting refined carbs and added fats\par will do this conjointly with wife\par work with NP on intesnive lifestyle modification\par more moderate intensity cardio eventually, start with walking\par consider GLP-1 agonist but would like to discuss this with gastroenterologist given substantial GERD hx\par cont CPAP\par \par rtc in 4 weeks.\par

## 2022-10-10 ENCOUNTER — NON-APPOINTMENT (OUTPATIENT)
Age: 62
End: 2022-10-10

## 2022-10-25 ENCOUNTER — APPOINTMENT (OUTPATIENT)
Dept: GASTROENTEROLOGY | Facility: CLINIC | Age: 62
End: 2022-10-25

## 2022-10-25 VITALS
BODY MASS INDEX: 42.36 KG/M2 | TEMPERATURE: 98 F | SYSTOLIC BLOOD PRESSURE: 125 MMHG | DIASTOLIC BLOOD PRESSURE: 80 MMHG | OXYGEN SATURATION: 98 % | HEART RATE: 80 BPM | WEIGHT: 286 LBS | RESPIRATION RATE: 16 BRPM | HEIGHT: 69 IN

## 2022-10-25 DIAGNOSIS — K62.89 OTHER SPECIFIED DISEASES OF ANUS AND RECTUM: ICD-10-CM

## 2022-10-25 PROCEDURE — 99214 OFFICE O/P EST MOD 30 MIN: CPT

## 2022-10-25 RX ORDER — MINERAL OIL, PETROLATUM, PHENYLEPHRINE HCL 2.5; 140; 749 MG/G; MG/G; MG/G
0.25-14-74.9 OINTMENT TOPICAL
Qty: 1 | Refills: 0 | Status: ACTIVE | COMMUNITY
Start: 2022-10-25 | End: 1900-01-01

## 2022-10-31 NOTE — PHYSICAL EXAM
[Alert] : alert [Normal Voice/Communication] : normal voice/communication [No Acute Distress] : no acute distress [Obese (BMI >= 30)] : obese (BMI >= 30) [Sclera] : the sclera and conjunctiva were normal [Hearing Threshold Finger Rub Not [place]] : hearing was normal [Normal Lips/Gums] : the lips and gums were normal [Oropharynx] : the oropharynx was normal [Normal Appearance] : the appearance of the neck was normal [No Neck Mass] : no neck mass was observed [No Respiratory Distress] : no respiratory distress [No Acc Muscle Use] : no accessory muscle use [Respiration, Rhythm And Depth] : normal respiratory rhythm and effort [Auscultation Breath Sounds / Voice Sounds] : lungs were clear to auscultation bilaterally [Heart Rate And Rhythm] : heart rate was normal and rhythm regular [Normal S1, S2] : normal S1 and S2 [Murmurs] : no murmurs [Bowel Sounds] : normal bowel sounds [Abdomen Tenderness] : non-tender [No Masses] : no abdominal mass palpated [Abdomen Soft] : soft [] : no hepatosplenomegaly [Cervical Lymph Nodes Enlarged Posterior Bilaterally] : no posterior cervical lymphadenopathy [Supraclavicular Lymph Nodes Enlarged Bilaterally] : no supraclavicular lymphadenopathy [Axillary Lymph Nodes Enlarged Bilaterally] : no axillary lymphadenopathy [Abnormal Walk] : normal gait [Involuntary Movements] : no involuntary movements were seen [No Focal Deficits] : no focal deficits [Oriented To Time, Place, And Person] : oriented to person, place, and time [Normal Affect] : the affect was normal [Normal Mood] : the mood was normal [de-identified] : non rigid

## 2022-10-31 NOTE — ASSESSMENT
[FreeTextEntry1] : 62 yo M pmh obesity, h/o HP s/p eradication, GERD c/b SSBE with LGD s/p Ablation with Chandu Stewart MD and is due for 1 year surveillance (negative x2 surveillance), colon polyps, who presents now for follow up.  Continuing to work on weight loss.  No absolute contraindication to SLP-1, and planning to start soon.  Has EGD planned.  UTD now with CRC Screening.  Bloating improving with dietary modifications.  Major complaint is anal itching c/w history of hemorrhoids.\par \par Impression:\par 1) Bloating - improved\par 2) Anal itching\par 3) GERD c/b SSBE with LGD s/p RFA x 1 -> negative dysplasia on last EGD - due 12/2022\par 4) Colon Polyps - repeat 2027\par 5) Morbid Obesity -> Non contraindication to SLP1\par 6) H/o HP s/p eradication\par 7) BRBPR - resolved\par 8) Intermittent constipation - controlled\par \par Plan:\par 1) Anal hygiene\par 2) Preparation H +/- steroid suppository\par 3) Planned to start SLP-1 with Dr. Epps in a few weeks.  We reviewed that I fully expect him to have GI side effects as he starts the meds and that we will need to see how he reacts as his body adjusts to the medication \par 4) Lifelong PPI\par 5) Planned for EGD for surveillance\par 6) Miralax + Metamucil\par 7) Colon 2027\par 8) All discussed at length. All questions answered. Plan agreed upon. \par 9) RV 6 months; if no response to anal itching treatment, may need flex sig and will let me know re: follow up

## 2022-10-31 NOTE — HISTORY OF PRESENT ILLNESS
[FreeTextEntry1] : Last visit: 6/2022\par Plan after last visit:\par Gill's esophagus with low grade dysplasia (530.85) (K22.710)\par Hiatal hernia (553.3) (K44.9)\par History of colon polyps (V12.72) (Z86.010)\par Obesity (278.00) (E66.9)\par Bloating symptom (787.3) (R14.0)\par Constipation, chronic (564.00) (K59.09)\par BRBPR (bright red blood per rectum) (569.3) (K62.5)\par \par 60 yo M pmh obesity, h/o HP s/p eradication, GERD c/b SSBE with LGD s/p Ablation with Chandu Stewart MD and is due for 1 year surveillance (negative x2 surveillance), colon polyps, who presents now for follow up. Overall, doing okay from GI Standpoint. UTD Barretts, Polyp surveillances. BRBPR in the setting of recent constipation, but now fully resolved. Major residual GI complaint is bloating, which appears to be mostly dietary related. Given long standing PPI will r/o SIBO. From health standpoint, morbid obesity is of the biggest concern. He feels ready to try medical interventions and wants to work with a multi disciplinary team.\par \par Impression:\par 1) Bloating - persistent\par 2) GERD c/b SSBE with LGD s/p RFA x 1 -> negative dysplasia on last EGD - due 12/2022\par 3) Colon Polyps - repeat 2027\par 4) Morbid Obesity -> He is ready for change\par 5) H/o HP s/p eradication\par 6) BRBPR - resolved\par 7) Intermittent constipation\par \par Plan:\par 1) SIBO breath test\par 2) Lifelong PPI - risks, benefits, limitations reviewed\par 3) Miralax + Metamucil\par 4) Referral to Gil Epps team for SLP-1 and Obesity management. Dietary, lifestyle counselling provided today.\par 5) EGD in 1 year with Pat as planned\par 6) Colon 2027\par 7) All discussed at length. All questions answered. Plan agreed upon. \par 8) RV 6 months. \par ----------------------------------------------------------------------------------\par \par Since last visit:\par Did not get SIBO test\par Saw Gil Epps\par \par Currently:\par Has an appt with Gil Epps in 2 week - will start SLP1 at that time\par Bloating is much better\par Has Endoscopy planned for 12/2021 for barretts surveillance\par Still continued with anal itching - unclear if dermatitis\par Still taking Metamucil and MIRALAX  with good regular BMs\par No blood in the stool\par No severe straining given the above\par Denies n/v/gerd/weight gain or loss\par \par \par \par \par -------------------------------------------------------------------------------------\par \par Last visit: 9/2022 - [facility]ADOR RODRÍGUEZ\par Obesity (278.00) (E66.9)\par Elevated hemoglobin A1c (790.29) (R73.09)\par \par BARIATRIC SURGERY HISTORY: none\par \par OBESITY COMORBIDITIES: MARÍA, pre-dm\par \par ANTI-OBESITY MEDICATIONS: none\par \par OBESITY MEDICATION SIDE EFFECTS: n/a\par \par \par \par Recommend the following:\par \par reviewed metabolic labs\par whole foods based eating strategy limiting refined carbs and added fats\par will do this conjointly with wife\par work with NP on intensive lifestyle modification\par more moderate intensity cardio eventually, start with walking\par consider GLP-1 agonist but would like to discuss this with gastroenterologist given substantial GERD hx\par cont CPAP\par \par rtc in 4 weeks.

## 2022-11-08 ENCOUNTER — APPOINTMENT (OUTPATIENT)
Dept: BARIATRICS/WEIGHT MGMT | Facility: CLINIC | Age: 62
End: 2022-11-08

## 2022-11-08 PROCEDURE — 99213 OFFICE O/P EST LOW 20 MIN: CPT | Mod: 95

## 2022-11-10 NOTE — REASON FOR VISIT
[Initial Evaluation] : an initial evaluation [FreeTextEntry1] : obesity [Home] : at home, [unfilled] , at the time of the visit. [Other Location: e.g. Home (Enter Location, City,State)___] : at [unfilled] [Spouse] : spouse

## 2022-11-10 NOTE — ASSESSMENT
[FreeTextEntry1] : BARIATRIC SURGERY HISTORY: none\par \par OBESITY COMORBIDITIES: MARÍA, pre-dm\par \par ANTI-OBESITY MEDICATIONS: none\par \par OBESITY MEDICATION SIDE EFFECTS: n/a\par \par \par \par Recommend the following:\par \par \par whole foods based eating strategy limiting refined carbs and added fats\par will do this conjointly with wife\par work with NP on intesnive lifestyle modification\par will start with semaglutide 0.25 mg as soon as URI symptoms ease up\par cont CPAP\par \par rtc in 4-6 weeks.\par

## 2022-11-10 NOTE — HISTORY OF PRESENT ILLNESS
[FreeTextEntry1] : 60 yo man with obesity\par \par 5'9\par weight = 275 lbs (not sure if changed since last visit\par \par he and his wife have been eating poorly\par he has subsequently developed a URI and is suffering from malaise and not physically active\par he received semaglutide in mail but has not started it yet\par \par otherwise without new complaints.

## 2022-11-15 ENCOUNTER — APPOINTMENT (OUTPATIENT)
Dept: INTERNAL MEDICINE | Facility: CLINIC | Age: 62
End: 2022-11-15

## 2022-11-15 VITALS
SYSTOLIC BLOOD PRESSURE: 124 MMHG | RESPIRATION RATE: 16 BRPM | OXYGEN SATURATION: 98 % | HEART RATE: 82 BPM | DIASTOLIC BLOOD PRESSURE: 82 MMHG | BODY MASS INDEX: 42.21 KG/M2 | HEIGHT: 69 IN | WEIGHT: 285 LBS

## 2022-11-15 DIAGNOSIS — Z23 ENCOUNTER FOR IMMUNIZATION: ICD-10-CM

## 2022-11-15 LAB
25(OH)D3 SERPL-MCNC: 37.1 NG/ML
ALBUMIN SERPL ELPH-MCNC: 4.7 G/DL
ALP BLD-CCNC: 47 U/L
ALT SERPL-CCNC: 39 U/L
ANION GAP SERPL CALC-SCNC: 14 MMOL/L
AST SERPL-CCNC: 23 U/L
BASOPHILS # BLD AUTO: 0.09 K/UL
BASOPHILS NFR BLD AUTO: 1.5 %
BILIRUB SERPL-MCNC: 0.5 MG/DL
BUN SERPL-MCNC: 17 MG/DL
CALCIUM SERPL-MCNC: 9.9 MG/DL
CHLORIDE SERPL-SCNC: 104 MMOL/L
CHOLEST SERPL-MCNC: 223 MG/DL
CO2 SERPL-SCNC: 21 MMOL/L
CREAT SERPL-MCNC: 0.81 MG/DL
EGFR: 100 ML/MIN/1.73M2
EOSINOPHIL # BLD AUTO: 0.17 K/UL
EOSINOPHIL NFR BLD AUTO: 2.7 %
ESTIMATED AVERAGE GLUCOSE: 123 MG/DL
GLUCOSE SERPL-MCNC: 126 MG/DL
HBA1C MFR BLD HPLC: 5.9 %
HCT VFR BLD CALC: 49.5 %
HDLC SERPL-MCNC: 31 MG/DL
HGB BLD-MCNC: 16 G/DL
IMM GRANULOCYTES NFR BLD AUTO: 0.5 %
LDLC SERPL CALC-MCNC: 122 MG/DL
LYMPHOCYTES # BLD AUTO: 1.68 K/UL
LYMPHOCYTES NFR BLD AUTO: 27.1 %
MAN DIFF?: NORMAL
MCHC RBC-ENTMCNC: 31.1 PG
MCHC RBC-ENTMCNC: 32.3 GM/DL
MCV RBC AUTO: 96.3 FL
MONOCYTES # BLD AUTO: 0.6 K/UL
MONOCYTES NFR BLD AUTO: 9.7 %
NEUTROPHILS # BLD AUTO: 3.63 K/UL
NEUTROPHILS NFR BLD AUTO: 58.5 %
NONHDLC SERPL-MCNC: 192 MG/DL
PLATELET # BLD AUTO: 196 K/UL
POTASSIUM SERPL-SCNC: 4.2 MMOL/L
PROT SERPL-MCNC: 7.3 G/DL
RBC # BLD: 5.14 M/UL
RBC # FLD: 13.5 %
SODIUM SERPL-SCNC: 139 MMOL/L
TRIGL SERPL-MCNC: 350 MG/DL
TSH SERPL-ACNC: 1.33 UIU/ML
WBC # FLD AUTO: 6.2 K/UL

## 2022-11-15 PROCEDURE — 99214 OFFICE O/P EST MOD 30 MIN: CPT | Mod: 25

## 2022-11-15 PROCEDURE — G0008: CPT

## 2022-11-15 PROCEDURE — 36415 COLL VENOUS BLD VENIPUNCTURE: CPT

## 2022-11-15 PROCEDURE — 90686 IIV4 VACC NO PRSV 0.5 ML IM: CPT

## 2022-11-15 NOTE — PHYSICAL EXAM
[No Acute Distress] : no acute distress [Well Nourished] : well nourished [Well Developed] : well developed [Well-Appearing] : well-appearing [Normal Voice/Communication] : normal voice/communication [Normal Sclera/Conjunctiva] : normal sclera/conjunctiva [PERRL] : pupils equal round and reactive to light [EOMI] : extraocular movements intact [Normal Outer Ear/Nose] : the outer ears and nose were normal in appearance [No JVD] : no jugular venous distention [No Lymphadenopathy] : no lymphadenopathy [Supple] : supple [Thyroid Normal, No Nodules] : the thyroid was normal and there were no nodules present [No Respiratory Distress] : no respiratory distress  [No Accessory Muscle Use] : no accessory muscle use [Clear to Auscultation] : lungs were clear to auscultation bilaterally [Normal Rate] : normal rate  [Regular Rhythm] : with a regular rhythm [Normal S1, S2] : normal S1 and S2 [No Murmur] : no murmur heard [No Carotid Bruits] : no carotid bruits [No Abdominal Bruit] : a ~M bruit was not heard ~T in the abdomen [Pedal Pulses Present] : the pedal pulses are present [No Edema] : there was no peripheral edema [No Palpable Aorta] : no palpable aorta [Soft] : abdomen soft [Non Tender] : non-tender [Non-distended] : non-distended [No Masses] : no abdominal mass palpated [No HSM] : no HSM [Normal Bowel Sounds] : normal bowel sounds [Normal Posterior Cervical Nodes] : no posterior cervical lymphadenopathy [Normal Anterior Cervical Nodes] : no anterior cervical lymphadenopathy [No CVA Tenderness] : no CVA  tenderness [No Spinal Tenderness] : no spinal tenderness [No Joint Swelling] : no joint swelling [Grossly Normal Strength/Tone] : grossly normal strength/tone [No Rash] : no rash [Coordination Grossly Intact] : coordination grossly intact [No Focal Deficits] : no focal deficits [Normal Gait] : normal gait [Speech Grossly Normal] : speech grossly normal [Memory Grossly Normal] : memory grossly normal [Normal Affect] : the affect was normal [Alert and Oriented x3] : oriented to person, place, and time [Normal Mood] : the mood was normal [Normal Insight/Judgement] : insight and judgment were intact [de-identified] : obese

## 2022-11-15 NOTE — HEALTH RISK ASSESSMENT
[Former] : Former [Yes] : Yes [Monthly or less (1 pt)] : Monthly or less (1 point) [1 or 2 (0 pts)] : 1 or 2 (0 points) [Never (0 pts)] : Never (0 points) [No] : In the past 12 months have you used drugs other than those required for medical reasons? No [0] : 2) Feeling down, depressed, or hopeless: Not at all (0) [PHQ-2 Negative - No further assessment needed] : PHQ-2 Negative - No further assessment needed [Audit-CScore] : 1 [SAM9Dbttp] : 0
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
## 2022-11-23 ENCOUNTER — APPOINTMENT (OUTPATIENT)
Dept: BARIATRICS/WEIGHT MGMT | Facility: CLINIC | Age: 62
End: 2022-11-23

## 2022-11-25 NOTE — DISCHARGE NOTE NURSING/CASE MANAGEMENT/SOCIAL WORK - NSDPLANG ASIS_GEN_ALL_CORE
Outreach Attempt was made to schedule Overdue Care Gap.    The Outcome of the Outreach was Contact was not made, letter/portal message sent. Care Gaps discussed included Colorectal, HTN and Immunizations.    LOV: 5/4/22 Pt due for office visit. Hypertension follow up. Please contact pt to schedule.   
Writer called and spoke with patient stating message below. Patient stated that he is aware now of needing to schedule and will call back in one week to schedule appointment.   
No

## 2022-12-06 ENCOUNTER — OUTPATIENT (OUTPATIENT)
Dept: OUTPATIENT SERVICES | Facility: HOSPITAL | Age: 62
LOS: 1 days | End: 2022-12-06

## 2022-12-06 VITALS
SYSTOLIC BLOOD PRESSURE: 124 MMHG | HEIGHT: 69 IN | HEART RATE: 72 BPM | DIASTOLIC BLOOD PRESSURE: 80 MMHG | TEMPERATURE: 98 F | OXYGEN SATURATION: 98 % | RESPIRATION RATE: 16 BRPM | WEIGHT: 250 LBS

## 2022-12-06 DIAGNOSIS — K22.710 BARRETT'S ESOPHAGUS WITH LOW GRADE DYSPLASIA: ICD-10-CM

## 2022-12-06 DIAGNOSIS — Z98.890 OTHER SPECIFIED POSTPROCEDURAL STATES: Chronic | ICD-10-CM

## 2022-12-06 DIAGNOSIS — K21.9 GASTRO-ESOPHAGEAL REFLUX DISEASE WITHOUT ESOPHAGITIS: ICD-10-CM

## 2022-12-06 DIAGNOSIS — E66.01 MORBID (SEVERE) OBESITY DUE TO EXCESS CALORIES: ICD-10-CM

## 2022-12-06 DIAGNOSIS — G47.33 OBSTRUCTIVE SLEEP APNEA (ADULT) (PEDIATRIC): ICD-10-CM

## 2022-12-06 DIAGNOSIS — U07.1 COVID-19: ICD-10-CM

## 2022-12-06 DIAGNOSIS — Z96.651 PRESENCE OF RIGHT ARTIFICIAL KNEE JOINT: Chronic | ICD-10-CM

## 2022-12-06 LAB
ALBUMIN SERPL ELPH-MCNC: 4.6 G/DL — SIGNIFICANT CHANGE UP (ref 3.3–5)
ALP SERPL-CCNC: 47 U/L — SIGNIFICANT CHANGE UP (ref 40–120)
ALT FLD-CCNC: 43 U/L — HIGH (ref 4–41)
ANION GAP SERPL CALC-SCNC: 9 MMOL/L — SIGNIFICANT CHANGE UP (ref 7–14)
AST SERPL-CCNC: 27 U/L — SIGNIFICANT CHANGE UP (ref 4–40)
BILIRUB SERPL-MCNC: 0.4 MG/DL — SIGNIFICANT CHANGE UP (ref 0.2–1.2)
BUN SERPL-MCNC: 14 MG/DL — SIGNIFICANT CHANGE UP (ref 7–23)
CALCIUM SERPL-MCNC: 9.6 MG/DL — SIGNIFICANT CHANGE UP (ref 8.4–10.5)
CHLORIDE SERPL-SCNC: 103 MMOL/L — SIGNIFICANT CHANGE UP (ref 98–107)
CO2 SERPL-SCNC: 26 MMOL/L — SIGNIFICANT CHANGE UP (ref 22–31)
CREAT SERPL-MCNC: 0.85 MG/DL — SIGNIFICANT CHANGE UP (ref 0.5–1.3)
EGFR: 98 ML/MIN/1.73M2 — SIGNIFICANT CHANGE UP
GLUCOSE SERPL-MCNC: 89 MG/DL — SIGNIFICANT CHANGE UP (ref 70–99)
HCT VFR BLD CALC: 46.8 % — SIGNIFICANT CHANGE UP (ref 39–50)
HGB BLD-MCNC: 15.6 G/DL — SIGNIFICANT CHANGE UP (ref 13–17)
MCHC RBC-ENTMCNC: 31 PG — SIGNIFICANT CHANGE UP (ref 27–34)
MCHC RBC-ENTMCNC: 33.3 GM/DL — SIGNIFICANT CHANGE UP (ref 32–36)
MCV RBC AUTO: 93 FL — SIGNIFICANT CHANGE UP (ref 80–100)
NRBC # BLD: 0 /100 WBCS — SIGNIFICANT CHANGE UP (ref 0–0)
NRBC # FLD: 0 K/UL — SIGNIFICANT CHANGE UP (ref 0–0)
PLATELET # BLD AUTO: 214 K/UL — SIGNIFICANT CHANGE UP (ref 150–400)
POTASSIUM SERPL-MCNC: 4.4 MMOL/L — SIGNIFICANT CHANGE UP (ref 3.5–5.3)
POTASSIUM SERPL-SCNC: 4.4 MMOL/L — SIGNIFICANT CHANGE UP (ref 3.5–5.3)
PROT SERPL-MCNC: 7.3 G/DL — SIGNIFICANT CHANGE UP (ref 6–8.3)
RBC # BLD: 5.03 M/UL — SIGNIFICANT CHANGE UP (ref 4.2–5.8)
RBC # FLD: 13.4 % — SIGNIFICANT CHANGE UP (ref 10.3–14.5)
SODIUM SERPL-SCNC: 138 MMOL/L — SIGNIFICANT CHANGE UP (ref 135–145)
WBC # BLD: 6.57 K/UL — SIGNIFICANT CHANGE UP (ref 3.8–10.5)
WBC # FLD AUTO: 6.57 K/UL — SIGNIFICANT CHANGE UP (ref 3.8–10.5)

## 2022-12-06 PROCEDURE — 93010 ELECTROCARDIOGRAM REPORT: CPT

## 2022-12-06 NOTE — H&P PST ADULT - PROBLEM SELECTOR PLAN 1
Pt is scheduled for gastroendoduodenoscopy on 12/6/21.  Verbal and written pre op instructions reviewed with patient and pt able to verbalize understanding.   Pt instructed to follow surgeon's guidelines regarding COVID testing preop.   Pt instructed to take Nexium AM of surgery with a sip of water, pt able to verbalize understanding. Patient tentatively scheduled for gastroendoscopy on 12/19/2022 .  Pre-op instructions provided.  Pt verbalized understanding with return demonstration.

## 2022-12-06 NOTE — H&P PST ADULT - GASTROINTESTINAL COMMENTS
Hx- of . barnes's esophagus Hx- of  barnes's esophagus. Pt says he does EGD every year for barnes's esophagus surveillance Pre op dx- barnes's esophagus with low grade dysplasia

## 2022-12-06 NOTE — H&P PST ADULT - HISTORY OF PRESENT ILLNESS
61 year old male with preop dx. barnes's esophagus presents to pre surgical testing. 61 year old male with preop dx of barnes's esophagus with low grade dysplasia is scheduled for gastroendoscopy.

## 2022-12-06 NOTE — H&P PST ADULT - MUSCULOSKELETAL
details… ROM intact/no joint swelling/no joint erythema/no joint warmth/no calf tenderness ROM intact/no joint swelling/no joint erythema/no joint warmth/no calf tenderness/normal gait/extremities exam
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
## 2022-12-06 NOTE — H&P PST ADULT - NEGATIVE MUSCULOSKELETAL SYMPTOMS
No indicators present
no neck pain/no arm pain L/no arm pain R/no back pain/no leg pain L/no leg pain R

## 2022-12-06 NOTE — H&P PST ADULT - PROBLEM SELECTOR PLAN 4
Pt Covid positive 11/08/2022.  Copy of Covid positive results in chart.  As per hospital policy , pt does not need to repeat covid test pre op.

## 2022-12-06 NOTE — H&P PST ADULT - ANESTHESIA, PREVIOUS REACTION, PROFILE
Pt 's wife says that in 2 occasions - pt aspirated after intubation ( 15 years ago)  and had hematoma after intubation ( 2020)  .Denies family Hx of malignant hyperthermia/nausea/vomiting nausea/vomiting

## 2022-12-08 RX ORDER — PEN NEEDLE, DIABETIC 32 GX 1/6"
32G X 4 MM NEEDLE, DISPOSABLE MISCELLANEOUS
Qty: 12 | Refills: 1 | Status: ACTIVE | COMMUNITY
Start: 2022-12-08 | End: 1900-01-01

## 2022-12-13 ENCOUNTER — APPOINTMENT (OUTPATIENT)
Dept: BARIATRICS/WEIGHT MGMT | Facility: CLINIC | Age: 62
End: 2022-12-13

## 2022-12-13 PROCEDURE — 99213 OFFICE O/P EST LOW 20 MIN: CPT | Mod: 95

## 2022-12-13 RX ORDER — PEN NEEDLE, DIABETIC 29 G X1/2"
32G X 4 MM NEEDLE, DISPOSABLE MISCELLANEOUS
Qty: 1 | Refills: 1 | Status: ACTIVE | COMMUNITY
Start: 2022-12-13 | End: 1900-01-01
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
## 2022-12-19 ENCOUNTER — RESULT REVIEW (OUTPATIENT)
Age: 62
End: 2022-12-19

## 2022-12-19 ENCOUNTER — OUTPATIENT (OUTPATIENT)
Dept: OUTPATIENT SERVICES | Facility: HOSPITAL | Age: 62
LOS: 1 days | Discharge: HOME | End: 2022-12-19

## 2022-12-19 ENCOUNTER — APPOINTMENT (OUTPATIENT)
Dept: GASTROENTEROLOGY | Facility: HOSPITAL | Age: 62
End: 2022-12-19

## 2022-12-19 VITALS
RESPIRATION RATE: 17 BRPM | WEIGHT: 279.99 LBS | HEIGHT: 69 IN | SYSTOLIC BLOOD PRESSURE: 134 MMHG | OXYGEN SATURATION: 97 % | TEMPERATURE: 97 F | HEART RATE: 73 BPM | DIASTOLIC BLOOD PRESSURE: 70 MMHG

## 2022-12-19 VITALS
DIASTOLIC BLOOD PRESSURE: 82 MMHG | OXYGEN SATURATION: 100 % | HEART RATE: 71 BPM | RESPIRATION RATE: 12 BRPM | SYSTOLIC BLOOD PRESSURE: 123 MMHG

## 2022-12-19 DIAGNOSIS — Z96.651 PRESENCE OF RIGHT ARTIFICIAL KNEE JOINT: Chronic | ICD-10-CM

## 2022-12-19 DIAGNOSIS — Z98.890 OTHER SPECIFIED POSTPROCEDURAL STATES: Chronic | ICD-10-CM

## 2022-12-19 DIAGNOSIS — K22.710 BARRETT'S ESOPHAGUS WITH LOW GRADE DYSPLASIA: ICD-10-CM

## 2022-12-19 PROCEDURE — 88305 TISSUE EXAM BY PATHOLOGIST: CPT | Mod: 26

## 2022-12-19 PROCEDURE — 43239 EGD BIOPSY SINGLE/MULTIPLE: CPT | Mod: GC

## 2022-12-19 NOTE — ASU PREOP CHECKLIST - BP NONINVASIVE DIASTOLIC (MM HG)
70 Consent (Near Eyelid Margin)/Introductory Paragraph: The rationale for Mohs was explained to the patient and consent was obtained. The risks, benefits and alternatives to therapy were discussed in detail. Specifically, the risks of ectropion or eyelid deformity, infection, scarring, bleeding, prolonged wound healing, incomplete removal, allergy to anesthesia, nerve injury and recurrence were addressed. Prior to the procedure, the treatment site was clearly identified and confirmed by the patient. All components of Universal Protocol/PAUSE Rule completed.

## 2022-12-22 PROBLEM — K22.710 BARRETT'S ESOPHAGUS WITH LOW GRADE DYSPLASIA: Chronic | Status: ACTIVE | Noted: 2022-12-06

## 2022-12-22 PROBLEM — E66.9 OBESITY, UNSPECIFIED: Chronic | Status: ACTIVE | Noted: 2022-12-06

## 2022-12-27 NOTE — ASSESSMENT
[FreeTextEntry1] : BARIATRIC SURGERY HISTORY: none\par \par OBESITY COMORBIDITIES: MARÍA, pre-dm\par \par ANTI-OBESITY MEDICATIONS: none\par \par OBESITY MEDICATION SIDE EFFECTS: n/a\par \par \par \par Recommend the following:\par \par \par whole foods based eating strategy limiting refined carbs and added fats\par will do this conjointly with wife\par work with NP on intensive lifestyle modification\par increase ozempic to .5 mg and then might switch to triulicity as they believe it is covered by insurance\par cont CPAP\par \par rtc in 4-6 weeks.\par

## 2022-12-27 NOTE — HISTORY OF PRESENT ILLNESS
[FreeTextEntry1] : 62 yo man with obesity\par \par 5'9\par weight had increasd to 286 but down a few lbs since starting semaglutide\par he has been tolerating 0.25 mg well\par of note last a1c was >6.5\par he and his wife have not been paying attention to what they are eating but he might have slight appetite reduction and slightly smaller portion sizes\par no exercise\par has no met with NP for lifestyle counseling\par \par

## 2022-12-28 ENCOUNTER — APPOINTMENT (OUTPATIENT)
Dept: BARIATRICS/WEIGHT MGMT | Facility: CLINIC | Age: 62
End: 2022-12-28
Payer: COMMERCIAL

## 2022-12-28 PROCEDURE — 99213 OFFICE O/P EST LOW 20 MIN: CPT | Mod: 95

## 2022-12-30 NOTE — ED PROVIDER NOTE - CONSTITUTIONAL MENTATION
MFM Follow-up Visit    Gaaytri Ho (: 1991) is a 27 y.o. [employer] at 27w3d with 3/2/2023, Alternate DENNISE Entry. Presents for evaluation of the following chief complaint(s):  High Risk Pregnancy (BMI>35, Hx  demise secondary to ONTD/multiple anomalies, Anxiety, Maternal PVCs)     Patient is working full time (40 hours/week) at [employer] in [place]. Scheduled to see Primary OB [facility]) on 2023. Follow-up with Cardiology on 2023. Pt having difficulty with missing work for OB/MFM appts. Requests intermittent FMLA for visits as she will require visits throughout pregnancy due to high risk nature of her pregnancy- appropriate request.     No HAs, edema. Denies preeclamptic symptoms. Reports good fetal movement. No bleeding, LOF or contractions. Reports cramping with vaginal pressure. Pt has not started Flagyl for BV, concerns regarding medication, reassured. Pt with hx recurrent BV. Counseling re safety of this medication. Was sent to [facility]riage for extended monitoring by OB on 22 d/t fetal tachycardia; was d/c'd home that day. Interval history since prior appt reviewed and updated as indicated. Review of Systems - per HPI; otherwise unremarkable. Exam:     Vitals:    22 1111   BP: 110/70     Recent Mood: fatigued    Recent Labs reviewed and addressed. Ultrasound: Please see formal ultrasound report under imaging tab. ASSESSMENT/PLAN:  Patient Active Problem List    Diagnosis Date Noted    Supervision of high risk pregnancy in third trimester 08/15/2017     Priority: High     OB hx:  G1:  LTCS w/ G1 at 38w4d w/ GHS on 2018 due to BPD estimated 10.5cm, multiple fetal anomalies, no diagnosis ever made (8#2.9) --subsequent  demise at approx 1 mo age  G4:   Repeat LTCS at 39w0d w/ Dr. Merritt Smyth on 18 (7#1.1)  22 at [facility]: Normal NT and nasal bone. Genetic counseling done by MD.  [facility].   22 at [facility]: Normal early anatomy; AC 54%  10/25/22 at [facility]: Normal anatomy and echo; AC 31%, EVIE 16.8cm, CL 4.5  12/30/2022 at [facility]: Appropriate growth; AC 21%, Overall 43%, EVIE 13.3 cm, UA Dopplers WNL, BPP 8/8. F/U MFM 4-5 weeks for growth/BPP        History of fetal anomaly in prior pregnancy, currently pregnant in third trimester 12/15/2022     Priority: Medium    Anemia during pregnancy in third trimester 11/30/2022     Priority: Medium     Hemoglobin 10.9 (11/29/2022)  10.5 on 12-28      Pelvic adhesive disease 10/17/2022     Priority: Medium    Maternal arrhythmia affecting pregnancy, antepartum, third trimester 09/15/2022     Priority: Medium     9/15/22  C/o heart palpitations-- has apple watch and has been as high as in the 180s on occasion (reviewed watch and has had several episodes of 140's +)   Denies having this prior to pregnancy  Denies any assoc CP/SOB, calf pain/swelling   Some anxiety but denies anything excessive   Denies any excessive caffeine intake  Dose use Cocoa butter daily  Discussed CV changes in pregnancy and progesterone effect on smooth mm   Will refer to cards for eval, possible Holter monitor/Echo   10/25/22 at [facility]: Saw cardiology last week, holter monitor for 3 days. EKG scheduled for 11/11/2022 and then follow up with Dr. Adeline Cantu at [facility]. Improved since cutting out cocoa butter. Echo (11/11/22): wnl; EF 55 - 60%. See Cards note 11/22/22:  \"Persists- pvcs on monitor- trial of mag if ok with OBGYN\"     11/29/22:  Rx Magnesium oxide given today (400mg BID )   12/30/22 [facility]:         Anxiety during pregnancy 08/29/2022     Priority: Medium     08/29/22 at [facility]: Pt reports increase anxiety being in our office due to hx of anomalies in previous pregnancy 4 years ago. Reports feeling \"down\" and laying around at home. Feeling \"extra irritable\".  Discussed starting SSRI.    09/26/22 at [facility]: Reports stable mood  10/25/22 at [facility]: Reports stable mood, discussed Zoloft, declined for now. 22 [facility]: Reports stable mood, some life stressors, declines starting Zoloft at this time. Start Zoloft 50 mg po daily as desired      Obesity affecting pregnancy in third trimester 2017     Priority: Medium     Pregravid BMI 37  No history of gestational diabetes  Hemoglobin A1c elevated at 5.9--> needs early 2-hour glucose tolerance test at 16-18wks  Please schedule  States wants a more \"natural\" option then Glucola--counseled on option of checking fsbg  --> no hx of GDM, will plan routine timing of glycemic control at 24-28wk with glucose checking (2022 [facility])      Prefers to do a 2hr 75mg glucola drink off of [facility] website at 28wks (pt will buy it herself on their websitea and bring w/ her) - PASSED      Pre-existing essential hypertension affecting pregnancy in third trimester 2017     Priority: Medium      Diagnosed 2012 in ER w/ \"stroke range bps\" per pt report  No longer taking Norvasc --pt self Dc'd around  and no meds since  No hx GISELA w/ G1/G2   Bp wnl on preg intake  Baseline HELLP labs within normal limits  Baseline PC ratio 0.1   TSH wnl  Last EKG wnl 2018   ASA 162mg recommended  22 at [facility]: /82  22 at [facility]: BP WNL  10/25/22 at [facility]: BP /74    22 [facility]: BP WNL, no PreE symptoms. Treatment of chronic HTN is recommended to maintain blood pressure in normal to mild range (<140/90). Low dose Aspirin (162 mg daily qhs) is recommended to be started by 12-16 weeks for the prevention of preeclampsia in high risk women; some benefit seen with starting up to 28 weeks. Recommend serial growth ultrasounds in third trimester, as well as  testing to monitor maternal and fetal well-being  chronic hypertension not on meds is 38-39 weeks, all timing adjusted based on maternal and fetal condition.    Close monitoring of blood pressure for first 2 weeks postpartum, consider home blood pressure monitoring, medication to maintain <150/100. ([facility] preeclampsia monitoring program continues through 14 days pp.)  Recommend in office BP check day 5-7 after delivery. Recommend 20mg PO lasix x 5 days beginning PPD #1      History of 2  sections 2018     Priority: Low     Hx LTCS x2:   G1:  LTCS w/ G1 at 38w4d w/ GHS on 2018 due to BPD estimated 10.5cm, multiple fetal anomalies, no diagnosis ever made (8#2.9) --subsequent  demise at approx 1 mo age    G4:   Repeat LTCS at 39w0d w/ Dr. Cooney First on 18 (7#1.1)    *Op note from Dr. Cooney First reviewed: \"adhesions, significant bleeding during case, poor exposure due to obesity/adhesions\";  \"very difficult visualization due to obesity/adhesions. Consider vertical midline skin incision if has another c/s\"    Pt states now considering TOLAC--counseled extensively and will continue to do so    9/15/22:  discussed, again, w/ pt her hx and last op note. Would not at all attempt  due to significant increased risks to both her and baby and would recommend referral to LIA for delivery if she were insistent on TOLAC. After extensive counseling pt agreeable to repeat c/s HOWEVER, states does NOT want a vertical midline incision. Pt counseled that in a life/death situation I cannot promise that a vertical incision may not be necessary.   Pt voices understanding         History of fetal anomaly in prior pregnancy, currently pregnant, third trimester 2018     Priority: Low       G1:  LTCS w/ G1 at 38w4d w/ GHS on 2018 due to BPD estimated 10.5cm, multiple fetal anomalies, no diagnosis ever made (8#2.9) --subsequent  demise at approx 1 mo age    (increased risk for NTD w/ future babies-->needs extra folic acid until end of 1st trimester (rx Folate 4mg every day)        History of neural tube defect in infant in prior pregnancy, currently pregnant 2018     Priority: Low      See Hx of Fetal Anomaly Overview            PTL precautions  HTN precautions. BV counseling. Mood evaluated today; PHQ2 reviewed. Counseling re possibility of peripartum worsening. Mood Reassuring today  Recommend lifestyle/behavioral modifications to enhance mood (exercise, sunshine, mood apps, nutritional modifications, etc). Addressed normal pregnancy complaints, reassured and offered suggestions for care  Reviewed gestational age precautions and activity goals/limitations  Nutritional counseling as well as specific goals based on current maternal and fetal status  Options for GERD therapy if becomes symptomatic over course of pregnancy  Gestational age appropriate preventive care regarding communicable disease transmission and vaccines as appropriate (including flu, TDaP, and COVID.)  Additional plans and concerns as documented in problem list.   All questions answered and concerns discussed. An electronic signature was used to authenticate this note. Lupis Salazar MD    I have spent 34 minutes reviewing previous notes, test results and face to face with the patient discussing the diagnosis and importance of compliance with the treatment plan, in addition to ultrasound findings, as well as documenting on the day of the visit (2022). Return for 4-5 weeks for growth/BPP.     Patient Active Problem List   Diagnosis Code    Supervision of high risk pregnancy in third trimester O09.93    Obesity affecting pregnancy in third trimester O99.213    History of 2  sections Z98.891    History of fetal anomaly in prior pregnancy, currently pregnant, third trimester O09.293    Pre-existing essential hypertension affecting pregnancy in third trimester O10.013    History of neural tube defect in infant in prior pregnancy, currently pregnant O09.299    Anxiety during pregnancy O99.340, F41.9    Maternal arrhythmia affecting pregnancy, antepartum, third trimester O99.413, I49.9    Pelvic adhesive disease N73.6    Anemia during pregnancy in third trimester O99.013    History of fetal anomaly in prior pregnancy, currently pregnant in third trimester O09.293     Visit Diagnoses         Codes    31 weeks gestation of pregnancy     Z3A.31    BMI 37.0-37.9, adult     Z68.37 alert/awake/oriented to person, place, time/situation

## 2022-12-31 NOTE — HISTORY OF PRESENT ILLNESS
[FreeTextEntry1] : This is a 62 year old male  being seen obesity followup visit. \par \par Patient has not weighed himself \par Patient is currently on ozempic 0.5mg but is no longer covered so trulicity was put in. He has not gotten trulicity yet \par Feels affect on appetite\par His wife notices  his stomach is getting smaller, patient does not notice any changes \par \par \par Doing house renovations \par since retired he is trying to keep active in the house \par No regimented exercise routine \par \par He just got a smart watch for [place],will start tracking steps

## 2022-12-31 NOTE — ASSESSMENT
[FreeTextEntry1] : BARIATRIC SURGERY HISTORY: none\par \par OBESITY COMORBIDITIES: MARÍA, pre-dm\par \par ANTI-OBESITY MEDICATIONS: ozempic \par \par OBESITY MEDICATION SIDE EFFECTS: n/a\par \par Plan: \par \par Focus on high fiber, lean animal protein\par whole food\par  3 meals\par prepare in advance \par track steps and encourage daily physical activity \par start trulicity when received \par \par f/u 2-3 weeks \par

## 2023-01-03 ENCOUNTER — NON-APPOINTMENT (OUTPATIENT)
Age: 63
End: 2023-01-03

## 2023-01-11 NOTE — ASSESSMENT
Case: [id number] Date/Time: 01/11/23 1330    Procedure: EC-SUSSY W/ CONT, CL-ICD,PM,BIV LEAD EXTRACTION W/ ANESTHESIA, BUSBY - DR. GIBSON    Location: [place] [address] / SURGERY [facility]    Surgeons: Recoveryonly Surgery        71y/o F with cardiac pacemaker in situ and recent generator change (12/2022) presents for lead extraction by Dr. Gibson.    Past Medical History:   Diagnosis Date   • A-fib (HCC)    • Delayed emergence from general anesthesia 12/14/2022    I had difficukty awakening in recovery and then slept for hours after i returned home   • High cholesterol 2015    medicated   • HLD (hyperlipidemia)    • Hypothyroid     takes levothyroxine   • Pacemaker 8/2013   • PONV (postoperative nausea and vomiting) Dec 14    I was very sick during recovery   • SSS (sick sinus syndrome) ([facility])      Past Surgical History:   Procedure Laterality Date   • OTHER CARDIAC SURGERY  08/2013    Pacemaker implant   • GYN SURGERY  1981    Ovarian cyst removal   • OTHER ORTHOPEDIC SURGERY  2019    Fell on ice & broke my left arm& elbow       Physical Exam    Airway   Mallampati: II  TM distance: >3 FB  Neck ROM: full       Cardiovascular   Rhythm: irregular  Rate: abnormal  (-) murmur     Dental   (+) upper dentures, lower dentures           Pulmonary - normal exam  Breath sounds clear to auscultation     Abdominal    Neurological - normal exam               Anesthesia Plan    ASA 3   ASA physical status 3 criteria: implanted pacemaker    Plan - general       Airway plan will be ETT  SUSSY Planned  (Arterial line)      Induction: intravenous    Postoperative Plan: Postoperative administration of opioids is intended.    Pertinent diagnostic labs and testing reviewed    Informed Consent:    Anesthetic plan and risks discussed with patient.    Use of blood products discussed with: patient whom consented to blood products.          [FreeTextEntry1] : Discussed with the patient health care maintenance.  \par Discussed age and condition appropriate vaccinations.  \par Discussed age appropriate cancer screening testing as indicated, including colon cancer screening/colonoscopy, prostate cancer screening/PSA testing, testicular cancer screening/self exam and skin cancer screening.  \par Discussed protection from the sun.  \par Discussed healthy diet, exercise and weight control for health.\par Discussed healthy habits including abstaining from smoking and drugs and abstention/moderation with alcohol.\par Discussed routine regular ophthalmology and dental follow up.\par Routine labs discussed with the patient and sent.

## 2023-01-24 ENCOUNTER — APPOINTMENT (OUTPATIENT)
Dept: BARIATRICS/WEIGHT MGMT | Facility: CLINIC | Age: 63
End: 2023-01-24
Payer: COMMERCIAL

## 2023-01-24 PROCEDURE — 99213 OFFICE O/P EST LOW 20 MIN: CPT | Mod: 95

## 2023-01-24 NOTE — ASSESSMENT
[FreeTextEntry1] : BARIATRIC SURGERY HISTORY: none\par \par OBESITY COMORBIDITIES: MARÍA, pre-dm\par \par ANTI-OBESITY MEDICATIONS: none\par \par OBESITY MEDICATION SIDE EFFECTS: n/a\par \par \par \par Recommend the following:\par \par \par whole foods based eating strategy limiting refined carbs and added fats\par will do this conjointly with wife\par cont work with NP on intensive lifestyle modification\par stay on ozempic 0.25mg until another GLP-1 prescription can be obtained but will ultimately need higher dosing\par cont CPAP\par \par rtc in 4-6 weeks.\par

## 2023-01-24 NOTE — REASON FOR VISIT
[Follow-Up] : a follow-up visit [FreeTextEntry1] : obesity [Home] : at home, [unfilled] , at the time of the visit. [Other Location: e.g. Home (Enter Location, City,State)___] : at [unfilled] [Spouse] : spouse

## 2023-01-24 NOTE — HISTORY OF PRESENT ILLNESS
[FreeTextEntry1] : 60 yo man with obesity\par \par 5'9\par feels weight has been stable on ozmepic 0.25 mg\par has not increased dose at all\par of note last a1c was >6.5\par had some issues with pain in feet and was concerned given hx of multiple DVTs in past\par was found to have vascular insufficiency and a "procedure" was done.\par has been in a lot of pain and much less mobile the past 1-2 weeks\par has been unable to obtain trulicity\par otherwise no new complaints\par \par

## 2023-02-16 ENCOUNTER — APPOINTMENT (OUTPATIENT)
Dept: INTERNAL MEDICINE | Facility: CLINIC | Age: 63
End: 2023-02-16
Payer: COMMERCIAL

## 2023-02-16 VITALS
HEIGHT: 69 IN | HEART RATE: 74 BPM | BODY MASS INDEX: 41.92 KG/M2 | SYSTOLIC BLOOD PRESSURE: 122 MMHG | WEIGHT: 283 LBS | OXYGEN SATURATION: 98 % | DIASTOLIC BLOOD PRESSURE: 80 MMHG | RESPIRATION RATE: 16 BRPM

## 2023-02-16 LAB
25(OH)D3 SERPL-MCNC: 41.5 NG/ML
ALBUMIN SERPL ELPH-MCNC: 4.7 G/DL
ALP BLD-CCNC: 51 U/L
ALT SERPL-CCNC: 32 U/L
ANION GAP SERPL CALC-SCNC: 13 MMOL/L
AST SERPL-CCNC: 21 U/L
BILIRUB SERPL-MCNC: 0.3 MG/DL
BUN SERPL-MCNC: 21 MG/DL
CALCIUM SERPL-MCNC: 9.8 MG/DL
CHLORIDE SERPL-SCNC: 101 MMOL/L
CHOLEST SERPL-MCNC: 211 MG/DL
CO2 SERPL-SCNC: 24 MMOL/L
CREAT SERPL-MCNC: 0.93 MG/DL
EGFR: 93 ML/MIN/1.73M2
ESTIMATED AVERAGE GLUCOSE: 143 MG/DL
GLUCOSE SERPL-MCNC: 105 MG/DL
HBA1C MFR BLD HPLC: 6.6 %
HDLC SERPL-MCNC: 31 MG/DL
LDLC SERPL CALC-MCNC: 101 MG/DL
NONHDLC SERPL-MCNC: 180 MG/DL
POTASSIUM SERPL-SCNC: 4.7 MMOL/L
PROT SERPL-MCNC: 7.8 G/DL
SODIUM SERPL-SCNC: 139 MMOL/L
TRIGL SERPL-MCNC: 393 MG/DL

## 2023-02-16 PROCEDURE — 99214 OFFICE O/P EST MOD 30 MIN: CPT | Mod: 25

## 2023-02-16 NOTE — PHYSICAL EXAM
[No Acute Distress] : no acute distress [Well Nourished] : well nourished [Well Developed] : well developed [Well-Appearing] : well-appearing [Normal Voice/Communication] : normal voice/communication [Normal Sclera/Conjunctiva] : normal sclera/conjunctiva [PERRL] : pupils equal round and reactive to light [EOMI] : extraocular movements intact [Normal Outer Ear/Nose] : the outer ears and nose were normal in appearance [No JVD] : no jugular venous distention [No Lymphadenopathy] : no lymphadenopathy [Supple] : supple [Thyroid Normal, No Nodules] : the thyroid was normal and there were no nodules present [No Respiratory Distress] : no respiratory distress  [No Accessory Muscle Use] : no accessory muscle use [Clear to Auscultation] : lungs were clear to auscultation bilaterally [Normal Rate] : normal rate  [Regular Rhythm] : with a regular rhythm [Normal S1, S2] : normal S1 and S2 [No Murmur] : no murmur heard [No Carotid Bruits] : no carotid bruits [No Abdominal Bruit] : a ~M bruit was not heard ~T in the abdomen [Pedal Pulses Present] : the pedal pulses are present [No Edema] : there was no peripheral edema [No Palpable Aorta] : no palpable aorta [Soft] : abdomen soft [Non Tender] : non-tender [Non-distended] : non-distended [No Masses] : no abdominal mass palpated [No HSM] : no HSM [Normal Bowel Sounds] : normal bowel sounds [No CVA Tenderness] : no CVA  tenderness [No Spinal Tenderness] : no spinal tenderness [No Joint Swelling] : no joint swelling [Grossly Normal Strength/Tone] : grossly normal strength/tone [No Rash] : no rash [Coordination Grossly Intact] : coordination grossly intact [No Focal Deficits] : no focal deficits [Normal Gait] : normal gait [Speech Grossly Normal] : speech grossly normal [Memory Grossly Normal] : memory grossly normal [Normal Affect] : the affect was normal [Alert and Oriented x3] : oriented to person, place, and time [Normal Mood] : the mood was normal [Normal Insight/Judgement] : insight and judgment were intact [de-identified] : obese

## 2023-02-16 NOTE — HEALTH RISK ASSESSMENT
[Yes] : Yes [Monthly or less (1 pt)] : Monthly or less (1 point) [1 or 2 (0 pts)] : 1 or 2 (0 points) [Never (0 pts)] : Never (0 points) [No] : In the past 12 months have you used drugs other than those required for medical reasons? No [0] : 2) Feeling down, depressed, or hopeless: Not at all (0) [PHQ-2 Negative - No further assessment needed] : PHQ-2 Negative - No further assessment needed [Former] : Former [Audit-CScore] : 1 [[id number]] : 0
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
## 2023-02-22 NOTE — ASU PREOP CHECKLIST - WARM FLUIDS/WARM BLANKETS
no 60-year-old male history of hypertension dyslipidemia diabetes with planned aortic valve replacement tomorrow by [facility] here evaluation of syncope. Patient initially called as a STEMI however canceled upon reassessment.  Had a cath in December that did not have any CAD.  Labs at this time noted for elevated potassium to 6.2, on repeat in the VBG potassium is 5.6.  Mild increasing creatinine, mild increase in troponin and repeat increased to 0.3.  Cardiology with cardiology for both aware.  Likely secondary to demand and strain on the myocardium.  Patient remained stable, will be given 1 dose of Lokelma and be transferred to [facility] for definitive care. ED to ED transfer.

## 2023-03-21 ENCOUNTER — APPOINTMENT (OUTPATIENT)
Dept: BARIATRICS/WEIGHT MGMT | Facility: CLINIC | Age: 63
End: 2023-03-21
Payer: COMMERCIAL

## 2023-03-21 PROCEDURE — 99213 OFFICE O/P EST LOW 20 MIN: CPT | Mod: 95

## 2023-03-21 NOTE — HISTORY OF PRESENT ILLNESS
[FreeTextEntry1] : 60 yo man with obesity\par \par 5'9\par today's weight = 272 lbs \par has tolerated trulicity 1.5mg \par weight has been coming down - down 13 lbs in past 2.5 month\par recent A1c down to 5.7, tot chol and non-hdl chol down, but still with HDL of 30 and non-HDL - 152\par \par eating much better.  going out to eat once per week with half portions but otherwise from home, no fried foods (they have air frier) and more whole foods, produce and fiber\par \par had family emergency and now he and wife have custody of  child\par previously they had been walking every day \par \par otherwise happy with progress\par \par

## 2023-03-21 NOTE — ASSESSMENT
[FreeTextEntry1] : BARIATRIC SURGERY HISTORY: none\par \par OBESITY COMORBIDITIES: MARÍA, pre-dm\par \par ANTI-OBESITY MEDICATIONS: none\par \par OBESITY MEDICATION SIDE EFFECTS: n/a\par \par \par \par Recommend the following:\par \par \par whole foods based eating strategy limiting refined carbs and added fats\par will do this conjointly with wife\par cont work with NP on intensive lifestyle modification\par cont on trulicity 1.5 mg for now as he has new shipment but for weight loss can consider going up to 3mg and then 4.5 mg\par glycemic control much better\par emphasized exercise to increase HDL as possible\par \par rtc in 4-6 weeks.\par

## 2023-05-16 ENCOUNTER — APPOINTMENT (OUTPATIENT)
Dept: INTERNAL MEDICINE | Facility: CLINIC | Age: 63
End: 2023-05-16
Payer: COMMERCIAL

## 2023-05-16 VITALS
HEART RATE: 78 BPM | SYSTOLIC BLOOD PRESSURE: 118 MMHG | DIASTOLIC BLOOD PRESSURE: 80 MMHG | HEIGHT: 69 IN | WEIGHT: 258 LBS | OXYGEN SATURATION: 97 % | BODY MASS INDEX: 38.21 KG/M2 | RESPIRATION RATE: 16 BRPM

## 2023-05-16 LAB
ALBUMIN SERPL ELPH-MCNC: 4.7 G/DL
ALP BLD-CCNC: 49 U/L
ALT SERPL-CCNC: 33 U/L
ANION GAP SERPL CALC-SCNC: 14 MMOL/L
APPEARANCE: CLEAR
AST SERPL-CCNC: 22 U/L
BACTERIA: NEGATIVE
BASOPHILS # BLD AUTO: 0.08 K/UL
BASOPHILS NFR BLD AUTO: 1.3 %
BILIRUB SERPL-MCNC: 0.5 MG/DL
BILIRUBIN URINE: NEGATIVE
BLOOD URINE: NEGATIVE
BUN SERPL-MCNC: 15 MG/DL
CALCIUM SERPL-MCNC: 9.9 MG/DL
CHLORIDE SERPL-SCNC: 100 MMOL/L
CHOLEST SERPL-MCNC: 182 MG/DL
CO2 SERPL-SCNC: 22 MMOL/L
COLOR: NORMAL
CREAT SERPL-MCNC: 0.92 MG/DL
CREAT SPEC-SCNC: 78 MG/DL
EGFR: 93 ML/MIN/1.73M2
EOSINOPHIL # BLD AUTO: 0.17 K/UL
EOSINOPHIL NFR BLD AUTO: 2.7 %
ESTIMATED AVERAGE GLUCOSE: 117 MG/DL
FERRITIN SERPL-MCNC: 144 NG/ML
GLUCOSE QUALITATIVE U: NEGATIVE
GLUCOSE SERPL-MCNC: 82 MG/DL
HBA1C MFR BLD HPLC: 5.7 %
HCT VFR BLD CALC: 48.3 %
HDLC SERPL-MCNC: 30 MG/DL
HGB BLD-MCNC: 15.8 G/DL
HYALINE CASTS: 0 /LPF
IMM GRANULOCYTES NFR BLD AUTO: 0.5 %
IRON SATN MFR SERPL: 35 %
IRON SERPL-MCNC: 100 UG/DL
KETONES URINE: NEGATIVE
LDLC SERPL CALC-MCNC: 102 MG/DL
LEUKOCYTE ESTERASE URINE: NEGATIVE
LYMPHOCYTES # BLD AUTO: 1.82 K/UL
LYMPHOCYTES NFR BLD AUTO: 29.2 %
MAN DIFF?: NORMAL
MCHC RBC-ENTMCNC: 31.7 PG
MCHC RBC-ENTMCNC: 32.7 GM/DL
MCV RBC AUTO: 97 FL
MICROALBUMIN 24H UR DL<=1MG/L-MCNC: <1.2 MG/DL
MICROALBUMIN/CREAT 24H UR-RTO: NORMAL MG/G
MICROSCOPIC-UA: NORMAL
MONOCYTES # BLD AUTO: 0.8 K/UL
MONOCYTES NFR BLD AUTO: 12.8 %
NEUTROPHILS # BLD AUTO: 3.33 K/UL
NEUTROPHILS NFR BLD AUTO: 53.5 %
NITRITE URINE: NEGATIVE
NONHDLC SERPL-MCNC: 152 MG/DL
PH URINE: 6
PLATELET # BLD AUTO: 222 K/UL
POTASSIUM SERPL-SCNC: 4.4 MMOL/L
PROT SERPL-MCNC: 7.2 G/DL
PROTEIN URINE: NEGATIVE
RBC # BLD: 4.98 M/UL
RBC # FLD: 13.4 %
RED BLOOD CELLS URINE: 1 /HPF
SODIUM SERPL-SCNC: 137 MMOL/L
SPECIFIC GRAVITY URINE: 1.01
SQUAMOUS EPITHELIAL CELLS: 0 /HPF
TIBC SERPL-MCNC: 287 UG/DL
TRIGL SERPL-MCNC: 249 MG/DL
UIBC SERPL-MCNC: 188 UG/DL
UROBILINOGEN URINE: NORMAL
WBC # FLD AUTO: 6.23 K/UL
WHITE BLOOD CELLS URINE: 0 /HPF

## 2023-05-16 PROCEDURE — 36415 COLL VENOUS BLD VENIPUNCTURE: CPT

## 2023-05-16 PROCEDURE — 99214 OFFICE O/P EST MOD 30 MIN: CPT | Mod: 25

## 2023-05-16 RX ORDER — HYDROMORPHONE HYDROCHLORIDE 2 MG/1
2 TABLET ORAL
Qty: 20 | Refills: 0 | Status: DISCONTINUED | COMMUNITY
Start: 2021-03-29 | End: 2023-05-16

## 2023-05-16 RX ORDER — AMOXICILLIN 500 MG/1
500 CAPSULE ORAL
Qty: 20 | Refills: 4 | Status: DISCONTINUED | COMMUNITY
Start: 2021-01-11 | End: 2023-05-16

## 2023-05-16 NOTE — HEALTH RISK ASSESSMENT
[Yes] : Yes [Monthly or less (1 pt)] : Monthly or less (1 point) [1 or 2 (0 pts)] : 1 or 2 (0 points) [Never (0 pts)] : Never (0 points) [No] : In the past 12 months have you used drugs other than those required for medical reasons? No [0] : 2) Feeling down, depressed, or hopeless: Not at all (0) [PHQ-2 Negative - No further assessment needed] : PHQ-2 Negative - No further assessment needed [Former] : Former [Audit-CScore] : 1 [[id number]] : 0
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
## 2023-05-16 NOTE — PHYSICAL EXAM
[No Acute Distress] : no acute distress [Well Nourished] : well nourished [Well Developed] : well developed [Well-Appearing] : well-appearing [Normal Voice/Communication] : normal voice/communication [Normal Sclera/Conjunctiva] : normal sclera/conjunctiva [PERRL] : pupils equal round and reactive to light [EOMI] : extraocular movements intact [Normal Outer Ear/Nose] : the outer ears and nose were normal in appearance [No JVD] : no jugular venous distention [No Lymphadenopathy] : no lymphadenopathy [Supple] : supple [Thyroid Normal, No Nodules] : the thyroid was normal and there were no nodules present [No Respiratory Distress] : no respiratory distress  [No Accessory Muscle Use] : no accessory muscle use [Clear to Auscultation] : lungs were clear to auscultation bilaterally [Normal Rate] : normal rate  [Regular Rhythm] : with a regular rhythm [Normal S1, S2] : normal S1 and S2 [No Murmur] : no murmur heard [No Carotid Bruits] : no carotid bruits [No Abdominal Bruit] : a ~M bruit was not heard ~T in the abdomen [Pedal Pulses Present] : the pedal pulses are present [No Edema] : there was no peripheral edema [No Palpable Aorta] : no palpable aorta [Soft] : abdomen soft [Non Tender] : non-tender [Non-distended] : non-distended [No Masses] : no abdominal mass palpated [No HSM] : no HSM [Normal Bowel Sounds] : normal bowel sounds [No CVA Tenderness] : no CVA  tenderness [No Spinal Tenderness] : no spinal tenderness [No Joint Swelling] : no joint swelling [Grossly Normal Strength/Tone] : grossly normal strength/tone [No Rash] : no rash [Coordination Grossly Intact] : coordination grossly intact [No Focal Deficits] : no focal deficits [Normal Gait] : normal gait [Speech Grossly Normal] : speech grossly normal [Memory Grossly Normal] : memory grossly normal [Normal Affect] : the affect was normal [Alert and Oriented x3] : oriented to person, place, and time [Normal Mood] : the mood was normal [Normal Insight/Judgement] : insight and judgment were intact [de-identified] : obese

## 2023-06-23 ENCOUNTER — APPOINTMENT (OUTPATIENT)
Dept: GASTROENTEROLOGY | Facility: HOSPITAL | Age: 63
End: 2023-06-23
Payer: COMMERCIAL

## 2023-06-23 VITALS
HEIGHT: 69 IN | WEIGHT: 264 LBS | TEMPERATURE: 98 F | OXYGEN SATURATION: 98 % | DIASTOLIC BLOOD PRESSURE: 82 MMHG | SYSTOLIC BLOOD PRESSURE: 126 MMHG | BODY MASS INDEX: 39.1 KG/M2 | HEART RATE: 90 BPM

## 2023-06-23 DIAGNOSIS — Z86.010 PERSONAL HISTORY OF COLONIC POLYPS: ICD-10-CM

## 2023-06-23 PROCEDURE — 99214 OFFICE O/P EST MOD 30 MIN: CPT

## 2023-07-13 PROBLEM — Z86.010 HISTORY OF COLON POLYPS: Status: ACTIVE | Noted: 2018-02-27

## 2023-07-13 NOTE — ASSESSMENT
[FreeTextEntry1] : 61 yo M pmh obesity, h/o HP s/p eradication, GERD c/b SSBE with LGD s/p Ablation with Chandu Stewart MD and UTD with surveillance, colon polyps, who presents now for follow up. Now on Trulicity with improving weight loss.  Bloating and bowel habits are both improving with the weight loss and overall he is doing quite well.\par \par Impression:\par 1) Bloating - improved\par 2) Anal itching\par 3) GERD c/b SSBE with LGD s/p RFA x 1 -> negative dysplasia on last EGD - due 12/2022\par 4) Colon Polyps - repeat 2027\par 5) Morbid Obesity -> On Trulicity with steady weight loss\par 6) H/o HP s/p eradication\par 7) BRBPR - resolved\par 8) Intermittent constipation - controlled\par \par Plan:\par 1) Can continue with anal hygiene and prn meds\par 2) c/w trulicity and f/u with Mich RODRÍGUEZ\par 3) Lifelong PPI\par 4) Planned for EGD for surveillance with Pat\par 5) Continue with Miralax + Metamucil - okay to ween down on miralax if not needed as much\par 6) Colon 2027\par 7) All discussed at length. All questions answered. Plan agreed upon. \par 8) RPV 1 year

## 2023-07-13 NOTE — HISTORY OF PRESENT ILLNESS
[FreeTextEntry1] : Last visit: 10/2022\par 62 yo M pmh obesity, h/o HP s/p eradication, GERD c/b SSBE with LGD s/p Ablation with Chandu Stewart MD and is due for 1 year surveillance (negative x2 surveillance), colon polyps, who presents now for follow up. Continuing to work on weight loss. No absolute contraindication to SLP-1, and planning to start soon. Has EGD planned. UTD now with CRC Screening. Bloating improving with dietary modifications. Major complaint is anal itching c/w history of hemorrhoids.\par \par Impression:\par 1) Bloating - improved\par 2) Anal itching\par 3) GERD c/b SSBE with LGD s/p RFA x 1 -> negative dysplasia on last EGD - due 12/2022\par 4) Colon Polyps - repeat 2027\par 5) Morbid Obesity -> Non contraindication to SLP1\par 6) H/o HP s/p eradication\par 7) BRBPR - resolved\par 8) Intermittent constipation - controlled\par \par Plan:\par 1) Anal hygiene\par 2) Preparation H +/- steroid suppository\par 3) Planned to start SLP-1 with Dr. Epps in a few weeks. We reviewed that I fully expect him to have GI side effects as he starts the meds and that we will need to see how he reacts as his body adjusts to the medication \par 4) Lifelong PPI\par 5) Planned for EGD for surveillance\par 6) Miralax + Metamucil\par 7) Colon 2027\par 8) All discussed at length. All questions answered. Plan agreed upon. \par 9) RV 6 months; if no response to anal itching treatment, may need flex sig and will let me know re: follow up. \par \par ------------------------------------------------------------------------------\par \par Since last visit:\par - Started Trulicity\par \par Currently:\par \par Obesity:\par 13-15 lb weight loss\par Trulicity with minimal side effects, no GI side effects\par Doing well overall\par Notes has better energy and bloating has improved with weight loss\par Going for walks, exercising well\par Follows with Mich RODRÍGUEZ\par \par SSBE:\par UTD with surveillance\par Still on PPI, tolerating well\par \par Colon Polyps:\par Due 2027, no red flags\par \par Anal Itching:\par Much improved with using bedet and bowel hygiene\par Has not needed topical agents in a bit\par \par Bloating:\par Has modified some diet and lost weight\par Combination of which has led to sig relief\par There is still breakthrough symptoms when not as rigorous with diet

## 2023-07-13 NOTE — PHYSICAL EXAM
[Alert] : alert [Normal Voice/Communication] : normal voice/communication [No Acute Distress] : no acute distress [Obese (BMI >= 30)] : obese (BMI >= 30) [Sclera] : the sclera and conjunctiva were normal [Hearing Threshold Finger Rub Not [place]] : hearing was normal [Normal Lips/Gums] : the lips and gums were normal [Oropharynx] : the oropharynx was normal [Normal Appearance] : the appearance of the neck was normal [No Neck Mass] : no neck mass was observed [No Respiratory Distress] : no respiratory distress [No Acc Muscle Use] : no accessory muscle use [Respiration, Rhythm And Depth] : normal respiratory rhythm and effort [Auscultation Breath Sounds / Voice Sounds] : lungs were clear to auscultation bilaterally [Heart Rate And Rhythm] : heart rate was normal and rhythm regular [Normal S1, S2] : normal S1 and S2 [Murmurs] : no murmurs [Bowel Sounds] : normal bowel sounds [Abdomen Tenderness] : non-tender [No Masses] : no abdominal mass palpated [Abdomen Soft] : soft [] : no hepatosplenomegaly [Cervical Lymph Nodes Enlarged Posterior Bilaterally] : no posterior cervical lymphadenopathy [Supraclavicular Lymph Nodes Enlarged Bilaterally] : no supraclavicular lymphadenopathy [Axillary Lymph Nodes Enlarged Bilaterally] : no axillary lymphadenopathy [Abnormal Walk] : normal gait [Involuntary Movements] : no involuntary movements were seen [No Focal Deficits] : no focal deficits [Oriented To Time, Place, And Person] : oriented to person, place, and time [Normal Affect] : the affect was normal [Normal Mood] : the mood was normal [de-identified] : non rigid

## 2023-07-13 NOTE — BRIEF OPERATIVE NOTE - NSICDXBRIEFPOSTOP_GEN_ALL_CORE_FT
DISPLAY PLAN FREE TEXT
POST-OP DIAGNOSIS:  Primary osteoarthritis of right knee 28-Dec-2020 12:31:54  Collin Rehman

## 2023-07-26 ENCOUNTER — APPOINTMENT (OUTPATIENT)
Dept: BARIATRICS/WEIGHT MGMT | Facility: CLINIC | Age: 63
End: 2023-07-26
Payer: COMMERCIAL

## 2023-07-26 PROCEDURE — 99213 OFFICE O/P EST LOW 20 MIN: CPT | Mod: 95

## 2023-07-28 NOTE — HISTORY OF PRESENT ILLNESS
[FreeTextEntry1] : 60 yo man with obesity\par \par 5'9\par today's weight = 257lbs down 29 lb since max weight 9 months ago\par has tolerated trulicity 1.5mg \par weight has been coming down - down 15 lbs in past 4 months \par walking daughter's dog every day which is providing some activity\par but still a lot of sleep deprivation and stress because of taking care of grandchild\par \par he and wife are generally eating better and more vegetables though not always time for food prep\par \par \par \par otherwise happy with progress\par \par

## 2023-07-28 NOTE — ASSESSMENT
[FreeTextEntry1] : BARIATRIC SURGERY HISTORY: none\par \par OBESITY COMORBIDITIES: MARÍA, pre-dm\par \par ANTI-OBESITY MEDICATIONS: on trulicity for DM2\par \par OBESITY MEDICATION SIDE EFFECTS: none\par \par \par \par Recommend the following:\par \par \par whole foods based eating strategy limiting refined carbs and added fats\par will do this conjointly with wife\par increase trulicity to 3mg  and then 4.5 mg\par glycemic control much better\par \par rtc in 4-6 weeks.\par

## 2023-08-10 ENCOUNTER — APPOINTMENT (OUTPATIENT)
Dept: INTERNAL MEDICINE | Facility: CLINIC | Age: 63
End: 2023-08-10
Payer: COMMERCIAL

## 2023-08-10 VITALS
DIASTOLIC BLOOD PRESSURE: 82 MMHG | WEIGHT: 264 LBS | RESPIRATION RATE: 16 BRPM | HEART RATE: 80 BPM | BODY MASS INDEX: 39.1 KG/M2 | HEIGHT: 69 IN | SYSTOLIC BLOOD PRESSURE: 118 MMHG | OXYGEN SATURATION: 97 %

## 2023-08-10 DIAGNOSIS — R73.09 OTHER ABNORMAL GLUCOSE: ICD-10-CM

## 2023-08-10 LAB
25(OH)D3 SERPL-MCNC: 38.5 NG/ML
ALBUMIN SERPL ELPH-MCNC: 4.8 G/DL
ALP BLD-CCNC: 57 U/L
ALT SERPL-CCNC: 29 U/L
ANION GAP SERPL CALC-SCNC: 12 MMOL/L
AST SERPL-CCNC: 21 U/L
BASOPHILS # BLD AUTO: 0.09 K/UL
BASOPHILS NFR BLD AUTO: 1 %
BILIRUB SERPL-MCNC: 0.5 MG/DL
BUN SERPL-MCNC: 19 MG/DL
CALCIUM SERPL-MCNC: 10.5 MG/DL
CHLORIDE SERPL-SCNC: 107 MMOL/L
CHOLEST SERPL-MCNC: 199 MG/DL
CO2 SERPL-SCNC: 25 MMOL/L
CREAT SERPL-MCNC: 1.05 MG/DL
EGFR: 80 ML/MIN/1.73M2
EOSINOPHIL # BLD AUTO: 0.13 K/UL
EOSINOPHIL NFR BLD AUTO: 1.5 %
ESTIMATED AVERAGE GLUCOSE: 111 MG/DL
GLUCOSE SERPL-MCNC: 103 MG/DL
HBA1C MFR BLD HPLC: 5.5 %
HCT VFR BLD CALC: 49.1 %
HDLC SERPL-MCNC: 33 MG/DL
HGB BLD-MCNC: 16.3 G/DL
IMM GRANULOCYTES NFR BLD AUTO: 0.5 %
LDLC SERPL CALC-MCNC: 113 MG/DL
LYMPHOCYTES # BLD AUTO: 1.87 K/UL
LYMPHOCYTES NFR BLD AUTO: 21.4 %
MAN DIFF?: NORMAL
MCHC RBC-ENTMCNC: 31.6 PG
MCHC RBC-ENTMCNC: 33.2 GM/DL
MCV RBC AUTO: 95.2 FL
MONOCYTES # BLD AUTO: 0.7 K/UL
MONOCYTES NFR BLD AUTO: 8 %
NEUTROPHILS # BLD AUTO: 5.92 K/UL
NEUTROPHILS NFR BLD AUTO: 67.6 %
NONHDLC SERPL-MCNC: 166 MG/DL
PLATELET # BLD AUTO: 224 K/UL
POTASSIUM SERPL-SCNC: 5 MMOL/L
PROT SERPL-MCNC: 7.7 G/DL
RBC # BLD: 5.16 M/UL
RBC # FLD: 13.8 %
SODIUM SERPL-SCNC: 145 MMOL/L
TRIGL SERPL-MCNC: 262 MG/DL
WBC # FLD AUTO: 8.75 K/UL

## 2023-08-10 PROCEDURE — 99214 OFFICE O/P EST MOD 30 MIN: CPT | Mod: 25

## 2023-08-10 PROCEDURE — 36415 COLL VENOUS BLD VENIPUNCTURE: CPT

## 2023-08-10 RX ORDER — DULAGLUTIDE 0.75 MG/.5ML
0.75 INJECTION, SOLUTION SUBCUTANEOUS
Qty: 4 | Refills: 1 | Status: DISCONTINUED | COMMUNITY
Start: 2022-12-13 | End: 2023-08-10

## 2023-08-10 RX ORDER — DULAGLUTIDE 0.75 MG/.5ML
0.75 INJECTION, SOLUTION SUBCUTANEOUS
Qty: 1 | Refills: 5 | Status: DISCONTINUED | COMMUNITY
Start: 2022-12-13 | End: 2023-08-10

## 2023-08-10 RX ORDER — DULAGLUTIDE 1.5 MG/.5ML
1.5 INJECTION, SOLUTION SUBCUTANEOUS
Qty: 3 | Refills: 1 | Status: DISCONTINUED | COMMUNITY
Start: 2023-01-25 | End: 2023-08-10

## 2023-08-10 NOTE — HEALTH RISK ASSESSMENT
[Yes] : Yes [Monthly or less (1 pt)] : Monthly or less (1 point) [1 or 2 (0 pts)] : 1 or 2 (0 points) [Never (0 pts)] : Never (0 points) [No] : In the past 12 months have you used drugs other than those required for medical reasons? No [0] : 2) Feeling down, depressed, or hopeless: Not at all (0) [PHQ-2 Negative - No further assessment needed] : PHQ-2 Negative - No further assessment needed [Former] : Former [> 15 Years] : > 15 Years [Audit-CScore] : 1 [[id number]] : 0 [de-identified] : quit a long time ago

## 2023-08-10 NOTE — PHYSICAL EXAM
[No Acute Distress] : no acute distress [Well Nourished] : well nourished [Well Developed] : well developed [Well-Appearing] : well-appearing [Normal Voice/Communication] : normal voice/communication [Normal Sclera/Conjunctiva] : normal sclera/conjunctiva [PERRL] : pupils equal round and reactive to light [EOMI] : extraocular movements intact [Normal Outer Ear/Nose] : the outer ears and nose were normal in appearance [No JVD] : no jugular venous distention [No Lymphadenopathy] : no lymphadenopathy [Supple] : supple [Thyroid Normal, No Nodules] : the thyroid was normal and there were no nodules present [No Respiratory Distress] : no respiratory distress  [No Accessory Muscle Use] : no accessory muscle use [Clear to Auscultation] : lungs were clear to auscultation bilaterally [Normal Rate] : normal rate  [Regular Rhythm] : with a regular rhythm [Normal S1, S2] : normal S1 and S2 [No Murmur] : no murmur heard [No Carotid Bruits] : no carotid bruits [No Abdominal Bruit] : a ~M bruit was not heard ~T in the abdomen [Pedal Pulses Present] : the pedal pulses are present [No Edema] : there was no peripheral edema [No Palpable Aorta] : no palpable aorta [Soft] : abdomen soft [Non Tender] : non-tender [Non-distended] : non-distended [No Masses] : no abdominal mass palpated [No HSM] : no HSM [Normal Bowel Sounds] : normal bowel sounds [No CVA Tenderness] : no CVA  tenderness [No Spinal Tenderness] : no spinal tenderness [No Joint Swelling] : no joint swelling [Grossly Normal Strength/Tone] : grossly normal strength/tone [No Rash] : no rash [Coordination Grossly Intact] : coordination grossly intact [No Focal Deficits] : no focal deficits [Normal Gait] : normal gait [Speech Grossly Normal] : speech grossly normal [Memory Grossly Normal] : memory grossly normal [Normal Affect] : the affect was normal [Alert and Oriented x3] : oriented to person, place, and time [Normal Mood] : the mood was normal [Normal Insight/Judgement] : insight and judgment were intact [Normal Oropharynx] : the oropharynx was normal [de-identified] : obese
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
## 2023-10-16 ENCOUNTER — OUTPATIENT (OUTPATIENT)
Dept: OUTPATIENT SERVICES | Facility: HOSPITAL | Age: 63
LOS: 1 days | End: 2023-10-16
Payer: COMMERCIAL

## 2023-10-16 ENCOUNTER — APPOINTMENT (OUTPATIENT)
Dept: BARIATRICS/WEIGHT MGMT | Facility: CLINIC | Age: 63
End: 2023-10-16
Payer: COMMERCIAL

## 2023-10-16 DIAGNOSIS — Z98.890 OTHER SPECIFIED POSTPROCEDURAL STATES: Chronic | ICD-10-CM

## 2023-10-16 DIAGNOSIS — E11.69 TYPE 2 DIABETES MELLITUS WITH OTHER SPECIFIED COMPLICATION: ICD-10-CM

## 2023-10-16 DIAGNOSIS — Z96.651 PRESENCE OF RIGHT ARTIFICIAL KNEE JOINT: Chronic | ICD-10-CM

## 2023-10-16 DIAGNOSIS — E66.01 MORBID (SEVERE) OBESITY DUE TO EXCESS CALORIES: ICD-10-CM

## 2023-10-16 DIAGNOSIS — I10 ESSENTIAL (PRIMARY) HYPERTENSION: ICD-10-CM

## 2023-10-16 DIAGNOSIS — E66.9 OBESITY, UNSPECIFIED: ICD-10-CM

## 2023-10-16 PROCEDURE — 99213 OFFICE O/P EST LOW 20 MIN: CPT | Mod: 95

## 2023-10-16 PROCEDURE — G0463: CPT

## 2023-10-16 RX ORDER — SEMAGLUTIDE 1.34 MG/ML
4 INJECTION, SOLUTION SUBCUTANEOUS
Qty: 1 | Refills: 5 | Status: DISCONTINUED | OUTPATIENT
Start: 2022-09-27 | End: 2023-10-16

## 2023-10-23 ENCOUNTER — NON-APPOINTMENT (OUTPATIENT)
Age: 63
End: 2023-10-23

## 2023-11-14 ENCOUNTER — APPOINTMENT (OUTPATIENT)
Dept: INTERNAL MEDICINE | Facility: CLINIC | Age: 63
End: 2023-11-14
Payer: COMMERCIAL

## 2023-11-14 ENCOUNTER — LABORATORY RESULT (OUTPATIENT)
Age: 63
End: 2023-11-14

## 2023-11-14 VITALS
WEIGHT: 268 LBS | RESPIRATION RATE: 16 BRPM | HEART RATE: 93 BPM | OXYGEN SATURATION: 96 % | BODY MASS INDEX: 39.69 KG/M2 | DIASTOLIC BLOOD PRESSURE: 60 MMHG | SYSTOLIC BLOOD PRESSURE: 114 MMHG | HEIGHT: 69 IN

## 2023-11-14 LAB
ALBUMIN SERPL ELPH-MCNC: 4.8 G/DL
ALP BLD-CCNC: 60 U/L
ALT SERPL-CCNC: 32 U/L
ANION GAP SERPL CALC-SCNC: 13 MMOL/L
AST SERPL-CCNC: 19 U/L
BILIRUB SERPL-MCNC: 0.4 MG/DL
BUN SERPL-MCNC: 21 MG/DL
CALCIUM SERPL-MCNC: 10.1 MG/DL
CHLORIDE SERPL-SCNC: 102 MMOL/L
CHOLEST SERPL-MCNC: 185 MG/DL
CO2 SERPL-SCNC: 24 MMOL/L
CREAT SERPL-MCNC: 0.88 MG/DL
EGFR: 97 ML/MIN/1.73M2
ESTIMATED AVERAGE GLUCOSE: 114 MG/DL
GLUCOSE SERPL-MCNC: 73 MG/DL
HBA1C MFR BLD HPLC: 5.6 %
HDLC SERPL-MCNC: 36 MG/DL
LDLC SERPL CALC-MCNC: 108 MG/DL
NONHDLC SERPL-MCNC: 150 MG/DL
POTASSIUM SERPL-SCNC: 4.7 MMOL/L
PROT SERPL-MCNC: 7.3 G/DL
PSA SERPL-MCNC: 0.63 NG/ML
SODIUM SERPL-SCNC: 140 MMOL/L
TRIGL SERPL-MCNC: 238 MG/DL

## 2023-11-14 PROCEDURE — 36415 COLL VENOUS BLD VENIPUNCTURE: CPT

## 2023-11-14 PROCEDURE — 99214 OFFICE O/P EST MOD 30 MIN: CPT | Mod: 25

## 2023-11-20 NOTE — DISCHARGE NOTE PROVIDER - REASON FOR ADMISSION
#[id number]    Pt states she was told to schedule a post-op visit one week after her 11/16 surgery. Pt informed the office is closed Thursday 11/23 and Dr. Marichuy Garcia will not be in the office Wed-Fri. Pt offered appointment 11/27 or 11/21 if she prefers to be seen sooner. Pt states she prefers 11/21. Appointment scheduled. Pt aware of scheduling and location details. Post procedure monitoring

## 2023-12-05 ENCOUNTER — OUTPATIENT (OUTPATIENT)
Dept: OUTPATIENT SERVICES | Facility: HOSPITAL | Age: 63
LOS: 1 days | End: 2023-12-05
Payer: COMMERCIAL

## 2023-12-05 VITALS
HEART RATE: 99 BPM | DIASTOLIC BLOOD PRESSURE: 85 MMHG | SYSTOLIC BLOOD PRESSURE: 135 MMHG | TEMPERATURE: 98 F | HEIGHT: 71 IN | WEIGHT: 261.91 LBS | RESPIRATION RATE: 16 BRPM | OXYGEN SATURATION: 96 %

## 2023-12-05 DIAGNOSIS — K22.710 BARRETT'S ESOPHAGUS WITH LOW GRADE DYSPLASIA: ICD-10-CM

## 2023-12-05 DIAGNOSIS — Z86.718 PERSONAL HISTORY OF OTHER VENOUS THROMBOSIS AND EMBOLISM: ICD-10-CM

## 2023-12-05 DIAGNOSIS — G47.33 OBSTRUCTIVE SLEEP APNEA (ADULT) (PEDIATRIC): ICD-10-CM

## 2023-12-05 DIAGNOSIS — E11.9 TYPE 2 DIABETES MELLITUS WITHOUT COMPLICATIONS: ICD-10-CM

## 2023-12-05 DIAGNOSIS — Z98.890 OTHER SPECIFIED POSTPROCEDURAL STATES: Chronic | ICD-10-CM

## 2023-12-05 DIAGNOSIS — K21.9 GASTRO-ESOPHAGEAL REFLUX DISEASE WITHOUT ESOPHAGITIS: ICD-10-CM

## 2023-12-05 DIAGNOSIS — Z96.651 PRESENCE OF RIGHT ARTIFICIAL KNEE JOINT: Chronic | ICD-10-CM

## 2023-12-05 LAB
A1C WITH ESTIMATED AVERAGE GLUCOSE RESULT: 5.4 % — SIGNIFICANT CHANGE UP (ref 4–5.6)
A1C WITH ESTIMATED AVERAGE GLUCOSE RESULT: 5.4 % — SIGNIFICANT CHANGE UP (ref 4–5.6)
ANION GAP SERPL CALC-SCNC: 12 MMOL/L — SIGNIFICANT CHANGE UP (ref 7–14)
ANION GAP SERPL CALC-SCNC: 12 MMOL/L — SIGNIFICANT CHANGE UP (ref 7–14)
BUN SERPL-MCNC: 19 MG/DL — SIGNIFICANT CHANGE UP (ref 7–23)
BUN SERPL-MCNC: 19 MG/DL — SIGNIFICANT CHANGE UP (ref 7–23)
CALCIUM SERPL-MCNC: 9.7 MG/DL — SIGNIFICANT CHANGE UP (ref 8.4–10.5)
CALCIUM SERPL-MCNC: 9.7 MG/DL — SIGNIFICANT CHANGE UP (ref 8.4–10.5)
CHLORIDE SERPL-SCNC: 106 MMOL/L — SIGNIFICANT CHANGE UP (ref 98–107)
CHLORIDE SERPL-SCNC: 106 MMOL/L — SIGNIFICANT CHANGE UP (ref 98–107)
CO2 SERPL-SCNC: 24 MMOL/L — SIGNIFICANT CHANGE UP (ref 22–31)
CO2 SERPL-SCNC: 24 MMOL/L — SIGNIFICANT CHANGE UP (ref 22–31)
CREAT SERPL-MCNC: 0.95 MG/DL — SIGNIFICANT CHANGE UP (ref 0.5–1.3)
CREAT SERPL-MCNC: 0.95 MG/DL — SIGNIFICANT CHANGE UP (ref 0.5–1.3)
EGFR: 90 ML/MIN/1.73M2 — SIGNIFICANT CHANGE UP
EGFR: 90 ML/MIN/1.73M2 — SIGNIFICANT CHANGE UP
ESTIMATED AVERAGE GLUCOSE: 108 — SIGNIFICANT CHANGE UP
ESTIMATED AVERAGE GLUCOSE: 108 — SIGNIFICANT CHANGE UP
GLUCOSE SERPL-MCNC: 116 MG/DL — HIGH (ref 70–99)
GLUCOSE SERPL-MCNC: 116 MG/DL — HIGH (ref 70–99)
HCT VFR BLD CALC: 47.7 % — SIGNIFICANT CHANGE UP (ref 39–50)
HCT VFR BLD CALC: 47.7 % — SIGNIFICANT CHANGE UP (ref 39–50)
HGB BLD-MCNC: 16.5 G/DL — SIGNIFICANT CHANGE UP (ref 13–17)
HGB BLD-MCNC: 16.5 G/DL — SIGNIFICANT CHANGE UP (ref 13–17)
MCHC RBC-ENTMCNC: 31.7 PG — SIGNIFICANT CHANGE UP (ref 27–34)
MCHC RBC-ENTMCNC: 31.7 PG — SIGNIFICANT CHANGE UP (ref 27–34)
MCHC RBC-ENTMCNC: 34.6 GM/DL — SIGNIFICANT CHANGE UP (ref 32–36)
MCHC RBC-ENTMCNC: 34.6 GM/DL — SIGNIFICANT CHANGE UP (ref 32–36)
MCV RBC AUTO: 91.6 FL — SIGNIFICANT CHANGE UP (ref 80–100)
MCV RBC AUTO: 91.6 FL — SIGNIFICANT CHANGE UP (ref 80–100)
NRBC # BLD: 0 /100 WBCS — SIGNIFICANT CHANGE UP (ref 0–0)
NRBC # BLD: 0 /100 WBCS — SIGNIFICANT CHANGE UP (ref 0–0)
NRBC # FLD: 0 K/UL — SIGNIFICANT CHANGE UP (ref 0–0)
NRBC # FLD: 0 K/UL — SIGNIFICANT CHANGE UP (ref 0–0)
PLATELET # BLD AUTO: 246 K/UL — SIGNIFICANT CHANGE UP (ref 150–400)
PLATELET # BLD AUTO: 246 K/UL — SIGNIFICANT CHANGE UP (ref 150–400)
POTASSIUM SERPL-MCNC: 4.4 MMOL/L — SIGNIFICANT CHANGE UP (ref 3.5–5.3)
POTASSIUM SERPL-MCNC: 4.4 MMOL/L — SIGNIFICANT CHANGE UP (ref 3.5–5.3)
POTASSIUM SERPL-SCNC: 4.4 MMOL/L — SIGNIFICANT CHANGE UP (ref 3.5–5.3)
POTASSIUM SERPL-SCNC: 4.4 MMOL/L — SIGNIFICANT CHANGE UP (ref 3.5–5.3)
RBC # BLD: 5.21 M/UL — SIGNIFICANT CHANGE UP (ref 4.2–5.8)
RBC # BLD: 5.21 M/UL — SIGNIFICANT CHANGE UP (ref 4.2–5.8)
RBC # FLD: 13.2 % — SIGNIFICANT CHANGE UP (ref 10.3–14.5)
RBC # FLD: 13.2 % — SIGNIFICANT CHANGE UP (ref 10.3–14.5)
SODIUM SERPL-SCNC: 142 MMOL/L — SIGNIFICANT CHANGE UP (ref 135–145)
SODIUM SERPL-SCNC: 142 MMOL/L — SIGNIFICANT CHANGE UP (ref 135–145)
WBC # BLD: 7.95 K/UL — SIGNIFICANT CHANGE UP (ref 3.8–10.5)
WBC # BLD: 7.95 K/UL — SIGNIFICANT CHANGE UP (ref 3.8–10.5)
WBC # FLD AUTO: 7.95 K/UL — SIGNIFICANT CHANGE UP (ref 3.8–10.5)
WBC # FLD AUTO: 7.95 K/UL — SIGNIFICANT CHANGE UP (ref 3.8–10.5)

## 2023-12-05 PROCEDURE — 93010 ELECTROCARDIOGRAM REPORT: CPT

## 2023-12-05 RX ORDER — MULTIVIT-MIN/FERROUS GLUCONATE 9 MG/15 ML
1 LIQUID (ML) ORAL
Refills: 0 | DISCHARGE

## 2023-12-05 RX ORDER — MULTIVIT-MIN/FERROUS GLUCONATE 9 MG/15 ML
1 LIQUID (ML) ORAL
Qty: 0 | Refills: 0 | DISCHARGE

## 2023-12-05 RX ORDER — ASPIRIN/CALCIUM CARB/MAGNESIUM 324 MG
1 TABLET ORAL
Refills: 0 | DISCHARGE

## 2023-12-05 RX ORDER — ESOMEPRAZOLE MAGNESIUM 40 MG/1
1 CAPSULE, DELAYED RELEASE ORAL
Qty: 0 | Refills: 0 | DISCHARGE

## 2023-12-05 RX ORDER — DULAGLUTIDE 4.5 MG/.5ML
3 INJECTION, SOLUTION SUBCUTANEOUS
Refills: 0 | DISCHARGE

## 2023-12-05 NOTE — H&P PST ADULT - PROBLEM SELECTOR PLAN 1
Scheduled for gastroendoscopy   Preop instructions provided and patient verbalizes understanding.  Labs done and results pending.
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
## 2023-12-05 NOTE — H&P PST ADULT - PROBLEM SELECTOR PLAN 2
pt takes aspirin , recently evaluated by pcp, Dr Lopez [phone] . Follow up call to Dr Lopez for recommendations for aspirin for procedure , pt will follow up as well. pt takes aspirin , recently evaluated by pcp, Dr Lopez [phone] . Follow up call to Dr Lopez for recommendations for aspirin for procedure , pt will follow up as well.

## 2023-12-05 NOTE — H&P PST ADULT - HISTORY OF PRESENT ILLNESS
This is a 62 y.o male with barretts esophagus with low grade dysplasia . Pt is scheduled for surgery 12/11/23 .

## 2023-12-05 NOTE — H&P PST ADULT - PROBLEM SELECTOR PLAN 3
bloods pending including hgba1c . Pt instructed to stop trulicity until post procedure . STAT FS upon admit , monitor FS during hospital stay .

## 2023-12-05 NOTE — H&P PST ADULT - NEGATIVE RESPIRATORY AND THORAX SYMPTOMS
----- Message from Raisa German sent at 7/1/2023 11:28 AM CDT -----  Contact: Paul [phone]  [email or web address]     Patient is calling for Medical Advice regarding:  Scheduled an appt for: Z13.41 (ICD-10-CM) - High risk of autism based on Modified Checklist for Autism in Toddlers, Revised (M-CHAT-R)    Would like response via [employer]:  call back     Comments:   Please call back to advise on appt.     
no wheezing/no dyspnea/no cough
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
## 2023-12-05 NOTE — H&P PST ADULT - FUNCTIONAL STATUS
walks over 1 mile daily ,stairs 5-6 flights daily , ADL's ; METS 5 , denies any cardiac complaints/4-10 METS

## 2023-12-08 NOTE — ASU PATIENT PROFILE, ADULT - FALL HARM RISK - HARM RISK INTERVENTIONS
Communicate Risk of Fall with Harm to all staff/Reinforce activity limits and safety measures with patient and family/Tailored Fall Risk Interventions/Visual Cue: Yellow wristband and red socks/Bed in lowest position, wheels locked, appropriate side rails in place/Call bell, personal items and telephone in reach/Instruct patient to call for assistance before getting out of bed or chair/Non-slip footwear when patient is out of bed/[place] to call system/Physically safe environment - no spills, clutter or unnecessary equipment/Purposeful Proactive Rounding/Room/bathroom lighting operational, light cord in reach Communicate Risk of Fall with Harm to all staff/Reinforce activity limits and safety measures with patient and family/Tailored Fall Risk Interventions/Visual Cue: Yellow wristband and red socks/Bed in lowest position, wheels locked, appropriate side rails in place/Call bell, personal items and telephone in reach/Instruct patient to call for assistance before getting out of bed or chair/Non-slip footwear when patient is out of bed/[place] to call system/Physically safe environment - no spills, clutter or unnecessary equipment/Purposeful Proactive Rounding/Room/bathroom lighting operational, light cord in reach
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
## 2023-12-11 ENCOUNTER — OUTPATIENT (OUTPATIENT)
Dept: OUTPATIENT SERVICES | Facility: HOSPITAL | Age: 63
LOS: 1 days | Discharge: HOME | End: 2023-12-11
Payer: COMMERCIAL

## 2023-12-11 ENCOUNTER — TRANSCRIPTION ENCOUNTER (OUTPATIENT)
Age: 63
End: 2023-12-11

## 2023-12-11 ENCOUNTER — APPOINTMENT (OUTPATIENT)
Dept: GASTROENTEROLOGY | Facility: HOSPITAL | Age: 63
End: 2023-12-11

## 2023-12-11 ENCOUNTER — RESULT REVIEW (OUTPATIENT)
Age: 63
End: 2023-12-11

## 2023-12-11 VITALS
OXYGEN SATURATION: 95 % | TEMPERATURE: 98 F | HEART RATE: 86 BPM | DIASTOLIC BLOOD PRESSURE: 86 MMHG | RESPIRATION RATE: 14 BRPM | SYSTOLIC BLOOD PRESSURE: 114 MMHG

## 2023-12-11 VITALS
SYSTOLIC BLOOD PRESSURE: 120 MMHG | RESPIRATION RATE: 13 BRPM | OXYGEN SATURATION: 96 % | DIASTOLIC BLOOD PRESSURE: 94 MMHG | HEART RATE: 74 BPM

## 2023-12-11 VITALS — HEIGHT: 69 IN | WEIGHT: 261.91 LBS

## 2023-12-11 DIAGNOSIS — Z98.890 OTHER SPECIFIED POSTPROCEDURAL STATES: Chronic | ICD-10-CM

## 2023-12-11 DIAGNOSIS — Z96.651 PRESENCE OF RIGHT ARTIFICIAL KNEE JOINT: Chronic | ICD-10-CM

## 2023-12-11 DIAGNOSIS — K22.710 BARRETT'S ESOPHAGUS WITH LOW GRADE DYSPLASIA: ICD-10-CM

## 2023-12-11 PROCEDURE — 88305 TISSUE EXAM BY PATHOLOGIST: CPT | Mod: 26

## 2023-12-11 PROCEDURE — 43239 EGD BIOPSY SINGLE/MULTIPLE: CPT | Mod: GC

## 2023-12-11 NOTE — ASU PATIENT PROFILE, ADULT - FALL HARM RISK - HARM RISK INTERVENTIONS
Communicate Risk of Fall with Harm to all staff/Reinforce activity limits and safety measures with patient and family/Tailored Fall Risk Interventions/Visual Cue: Yellow wristband and red socks/Bed in lowest position, wheels locked, appropriate side rails in place/Call bell, personal items and telephone in reach/Instruct patient to call for assistance before getting out of bed or chair/Non-slip footwear when patient is out of bed/[place] to call system/Physically safe environment - no spills, clutter or unnecessary equipment/Purposeful Proactive Rounding/Room/bathroom lighting operational, light cord in reach Communicate Risk of Fall with Harm to all staff/Reinforce activity limits and safety measures with patient and family/Tailored Fall Risk Interventions/Visual Cue: Yellow wristband and red socks/Bed in lowest position, wheels locked, appropriate side rails in place/Call bell, personal items and telephone in reach/Instruct patient to call for assistance before getting out of bed or chair/Non-slip footwear when patient is out of bed/[place] to call system/Physically safe environment - no spills, clutter or unnecessary equipment/Purposeful Proactive Rounding/Room/bathroom lighting operational, light cord in reach
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
## 2023-12-11 NOTE — ASU DISCHARGE PLAN (ADULT/PEDIATRIC) - NSDISCH_DIET_ENDO_ALL_CORE_FT
EMS Notes
You should resume your previous diet unless otherwise instructed by your doctor. Do not drink alcoholic beverages for the next 24 hours.

## 2023-12-11 NOTE — ASU DISCHARGE PLAN (ADULT/PEDIATRIC) - NS MD DC FALL RISK RISK
For information on Fall & Injury Prevention, visit: [email or web address] OR  [email or web address] OR  https://www.cdc.gov/steadi/patient.html For information on Fall & Injury Prevention, visit: [email or web address] OR  [email or web address]/[email or web address] OR  https://www.cdc.gov/steadi/patient.html

## 2023-12-11 NOTE — ASU DISCHARGE PLAN (ADULT/PEDIATRIC) - "IF YOU OR YOUR GUARDIAN/FAMILY IS A SMOKER, IT IS IMPORTANT FOR YOUR HEALTH TO STOP SMOKING. PLEASE BE AWARE THAT SECOND HAND SMOKE IS ALSO HARMFUL."
----- Message from Mary Azar, RN sent at 12/19/2018 11:32 AM CST -----  Dr. Tijerina's LOS:  1. Prior auth for prolia for osteoporosis [id number] entered and Wilda GARCIA notified  2. Treatment once approved staff message routed to triage and to Wilda  4. MD, lab (cmp, cbc, l[facility]), prolia 6 months after above labs entered    Wilda please let triage once approved.     Triage-please call pt to schedule first injection.  Pt will also need to schedule 6 mo follow up, labs, and injection after first tx.    
Per Wilda GARCIA, no authorization is needed, pt has medicare.    Karina PSR to call pt to schedule.    
Statement Selected

## 2023-12-11 NOTE — ASU PREOP CHECKLIST - HEIGHT IN INCHES
My findings and recommendations are based on patient's symptoms, eye exam, diagnostic testing, and records. 9

## 2023-12-14 ENCOUNTER — APPOINTMENT (OUTPATIENT)
Dept: INTERNAL MEDICINE | Facility: CLINIC | Age: 63
End: 2023-12-14
Payer: COMMERCIAL

## 2023-12-14 VITALS
DIASTOLIC BLOOD PRESSURE: 80 MMHG | BODY MASS INDEX: 40.14 KG/M2 | HEIGHT: 69 IN | HEART RATE: 73 BPM | SYSTOLIC BLOOD PRESSURE: 126 MMHG | WEIGHT: 271 LBS | OXYGEN SATURATION: 98 % | RESPIRATION RATE: 16 BRPM

## 2023-12-14 DIAGNOSIS — Z23 ENCOUNTER FOR IMMUNIZATION: ICD-10-CM

## 2023-12-14 PROBLEM — E11.9 TYPE 2 DIABETES MELLITUS WITHOUT COMPLICATIONS: Chronic | Status: ACTIVE | Noted: 2023-12-05

## 2023-12-14 LAB
25(OH)D3 SERPL-MCNC: 30.5 NG/ML
ALBUMIN SERPL ELPH-MCNC: 4.9 G/DL
ALP BLD-CCNC: 59 U/L
ALT SERPL-CCNC: 36 U/L
ANION GAP SERPL CALC-SCNC: 12 MMOL/L
AST SERPL-CCNC: 25 U/L
BASOPHILS # BLD AUTO: 0.09 K/UL
BASOPHILS NFR BLD AUTO: 1.3 %
BILIRUB SERPL-MCNC: 0.4 MG/DL
BUN SERPL-MCNC: 18 MG/DL
CALCIUM SERPL-MCNC: 10.4 MG/DL
CHLORIDE SERPL-SCNC: 103 MMOL/L
CHOLEST SERPL-MCNC: 205 MG/DL
CO2 SERPL-SCNC: 25 MMOL/L
CREAT SERPL-MCNC: 0.92 MG/DL
CREAT SPEC-SCNC: 83 MG/DL
EGFR: 93 ML/MIN/1.73M2
EOSINOPHIL # BLD AUTO: 0.24 K/UL
EOSINOPHIL NFR BLD AUTO: 3.4 %
ESTIMATED AVERAGE GLUCOSE: 114 MG/DL
GLUCOSE SERPL-MCNC: 85 MG/DL
HBA1C MFR BLD HPLC: 5.6 %
HCT VFR BLD CALC: 50.4 %
HDLC SERPL-MCNC: 33 MG/DL
HGB BLD-MCNC: 16.6 G/DL
IMM GRANULOCYTES NFR BLD AUTO: 1.1 %
LDLC SERPL CALC-MCNC: 96 MG/DL
LYMPHOCYTES # BLD AUTO: 1.91 K/UL
LYMPHOCYTES NFR BLD AUTO: 26.8 %
MAGNESIUM SERPL-MCNC: 1.9 MG/DL
MAN DIFF?: NORMAL
MCHC RBC-ENTMCNC: 31.8 PG
MCHC RBC-ENTMCNC: 32.9 GM/DL
MCV RBC AUTO: 96.6 FL
MICROALBUMIN 24H UR DL<=1MG/L-MCNC: <1.2 MG/DL
MICROALBUMIN/CREAT 24H UR-RTO: NORMAL MG/G
MONOCYTES # BLD AUTO: 0.71 K/UL
MONOCYTES NFR BLD AUTO: 10 %
NEUTROPHILS # BLD AUTO: 4.09 K/UL
NEUTROPHILS NFR BLD AUTO: 57.4 %
NONHDLC SERPL-MCNC: 172 MG/DL
PLATELET # BLD AUTO: 223 K/UL
POTASSIUM SERPL-SCNC: 5.1 MMOL/L
PROT SERPL-MCNC: 7.5 G/DL
RBC # BLD: 5.22 M/UL
RBC # FLD: 13.6 %
SODIUM SERPL-SCNC: 141 MMOL/L
TRIGL SERPL-MCNC: 459 MG/DL
TSH SERPL-ACNC: 1.35 UIU/ML
WBC # FLD AUTO: 7.12 K/UL

## 2023-12-14 PROCEDURE — 90677 PCV20 VACCINE IM: CPT

## 2023-12-14 PROCEDURE — G0009: CPT

## 2023-12-21 ENCOUNTER — NON-APPOINTMENT (OUTPATIENT)
Age: 63
End: 2023-12-21

## 2023-12-26 ENCOUNTER — RX RENEWAL (OUTPATIENT)
Age: 63
End: 2023-12-26

## 2024-01-17 ENCOUNTER — OUTPATIENT (OUTPATIENT)
Dept: OUTPATIENT SERVICES | Facility: HOSPITAL | Age: 64
LOS: 1 days | End: 2024-01-17
Payer: COMMERCIAL

## 2024-01-17 ENCOUNTER — APPOINTMENT (OUTPATIENT)
Dept: BARIATRICS/WEIGHT MGMT | Facility: CLINIC | Age: 64
End: 2024-01-17
Payer: COMMERCIAL

## 2024-01-17 DIAGNOSIS — E11.69 TYPE 2 DIABETES MELLITUS WITH OTHER SPECIFIED COMPLICATION: ICD-10-CM

## 2024-01-17 DIAGNOSIS — I10 ESSENTIAL (PRIMARY) HYPERTENSION: ICD-10-CM

## 2024-01-17 DIAGNOSIS — Z98.890 OTHER SPECIFIED POSTPROCEDURAL STATES: Chronic | ICD-10-CM

## 2024-01-17 DIAGNOSIS — Z96.651 PRESENCE OF RIGHT ARTIFICIAL KNEE JOINT: Chronic | ICD-10-CM

## 2024-01-17 PROCEDURE — 99213 OFFICE O/P EST LOW 20 MIN: CPT | Mod: 95

## 2024-01-17 PROCEDURE — G0463: CPT

## 2024-01-17 NOTE — HISTORY OF PRESENT ILLNESS
[FreeTextEntry1] : 63 yo man with obesity and DM2  5'9 today's weight = 268 lbs (unchanged from last visit) Reports increased appetite/feels more hungry while on trulicity 3mg.  was mixup with medications and unchanged dosing walking a bit with granddaughter at home  otherwise without complaints today.

## 2024-01-17 NOTE — ASSESSMENT
[FreeTextEntry1] : Recommend the following:   whole foods based eating strategy limiting refined carbs and added fats will do this conjointly with wife Recommend change to mounjaro 5mg>may need prior auth.  If Mounjaro not covered, and if ozempic not covered,  can increase Trulicity to 4.5mg weekly   rtc in 2 months.

## 2024-02-14 ENCOUNTER — APPOINTMENT (OUTPATIENT)
Dept: INTERNAL MEDICINE | Facility: CLINIC | Age: 64
End: 2024-02-14
Payer: COMMERCIAL

## 2024-02-14 VITALS
RESPIRATION RATE: 16 BRPM | WEIGHT: 268 LBS | DIASTOLIC BLOOD PRESSURE: 78 MMHG | OXYGEN SATURATION: 97 % | HEART RATE: 95 BPM | SYSTOLIC BLOOD PRESSURE: 128 MMHG | HEIGHT: 69 IN | BODY MASS INDEX: 39.69 KG/M2

## 2024-02-14 DIAGNOSIS — R14.0 ABDOMINAL DISTENSION (GASEOUS): ICD-10-CM

## 2024-02-14 PROCEDURE — 36415 COLL VENOUS BLD VENIPUNCTURE: CPT

## 2024-02-14 PROCEDURE — 99214 OFFICE O/P EST MOD 30 MIN: CPT

## 2024-02-14 PROCEDURE — G2211 COMPLEX E/M VISIT ADD ON: CPT
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
## 2024-02-14 NOTE — PHYSICAL EXAM
[No Acute Distress] : no acute distress [Well Nourished] : well nourished [Well Developed] : well developed [Well-Appearing] : well-appearing [Normal Voice/Communication] : normal voice/communication [Normal Sclera/Conjunctiva] : normal sclera/conjunctiva [PERRL] : pupils equal round and reactive to light [EOMI] : extraocular movements intact [Normal Outer Ear/Nose] : the outer ears and nose were normal in appearance [Normal Oropharynx] : the oropharynx was normal [No JVD] : no jugular venous distention [No Lymphadenopathy] : no lymphadenopathy [Supple] : supple [Thyroid Normal, No Nodules] : the thyroid was normal and there were no nodules present [No Respiratory Distress] : no respiratory distress  [No Accessory Muscle Use] : no accessory muscle use [Clear to Auscultation] : lungs were clear to auscultation bilaterally [Normal Rate] : normal rate  [Regular Rhythm] : with a regular rhythm [Normal S1, S2] : normal S1 and S2 [No Murmur] : no murmur heard [No Carotid Bruits] : no carotid bruits [No Abdominal Bruit] : a ~M bruit was not heard ~T in the abdomen [Pedal Pulses Present] : the pedal pulses are present [No Edema] : there was no peripheral edema [No Palpable Aorta] : no palpable aorta [Soft] : abdomen soft [Non Tender] : non-tender [Non-distended] : non-distended [No Masses] : no abdominal mass palpated [No HSM] : no HSM [Normal Bowel Sounds] : normal bowel sounds [Normal Posterior Cervical Nodes] : no posterior cervical lymphadenopathy [Normal Anterior Cervical Nodes] : no anterior cervical lymphadenopathy [No CVA Tenderness] : no CVA  tenderness [No Spinal Tenderness] : no spinal tenderness [No Joint Swelling] : no joint swelling [Grossly Normal Strength/Tone] : grossly normal strength/tone [Coordination Grossly Intact] : coordination grossly intact [No Focal Deficits] : no focal deficits [Normal Gait] : normal gait [Speech Grossly Normal] : speech grossly normal [Memory Grossly Normal] : memory grossly normal [Normal Affect] : the affect was normal [Alert and Oriented x3] : oriented to person, place, and time [Normal Mood] : the mood was normal [Normal Insight/Judgement] : insight and judgment were intact [de-identified] : obese

## 2024-02-14 NOTE — HEALTH RISK ASSESSMENT
[Yes] : Yes [Monthly or less (1 pt)] : Monthly or less (1 point) [1 or 2 (0 pts)] : 1 or 2 (0 points) [Never (0 pts)] : Never (0 points) [No] : In the past 12 months have you used drugs other than those required for medical reasons? No [0] : 2) Feeling down, depressed, or hopeless: Not at all (0) [PHQ-2 Negative - No further assessment needed] : PHQ-2 Negative - No further assessment needed [Former] : Former [> 15 Years] : > 15 Years [Audit-CScore] : 1 [SCT2Nufke] : 0

## 2024-02-26 NOTE — OCCUPATIONAL THERAPY INITIAL EVALUATION ADULT - LIGHT TOUCH SENSATION, LUE, REHAB EVAL
[facility] MEDICINE DISCHARGE SUMMARY   Admission Date: 2/24/2024  PCP: Cristobal Puga DO    Discharge Date: 2/26/2024 Discharge Provider: Bartolome Crawford*     ADMISSION INFORMATION   Reason For Admission: Elevated troponin [R79.89]  Right arm pain [M79.601]    Final Discharge Diagnoses:   #. Right shoulder pain   #. Elevated Troponin likely secondary to type 2 demand ischemia. Doubt true NSTEMI   #. Ischemic Cardiomyopathy; not in exacerbation   #. CAD s/p CABG  #. Paroxysmal Afib   #. CARLOTTA   #. T2DM   Smoking status: Not a Tobacco User  Body mass index is 39.53 kg/m².: Patient is obese with BMI 30-40    Code Status on Discharge: Full Resuscitation  TCM FOLLOW UP     Disposition: Home  Readmission Risk Score (%) = 19.91    Medication Changes and Reason:      Summary of your Discharge Medications        Take these Medications        Details   albuterol 108 (90 Base) MCG/ACT inhaler   INHALE 2 PUFFS INTO THE LUNGS EVERY FOUR (4) HOURS AS NEEDED FOR SHORTNESS OF BREATH OR WHEEZING.     amLODIPine 5 MG tablet  Commonly known as: NORVASC   Take 1 tablet by mouth daily.  Comment: FOR NEXT WEEK     aspirin 81 MG EC tablet  Commonly known as: ECOTRIN   Take 81 mg by mouth daily.     atorvastatin 40 MG tablet  Commonly known as: LIPITOR   Take 1 tablet by mouth nightly.     clopidogrel 75 MG tablet  Commonly known as: PLAVIX   Take 75 mg by mouth daily.     ezetimibe 10 MG tablet  Commonly known as: ZETIA   Take 1 tablet by mouth daily.     furosemide 40 MG tablet  Commonly known as: LASIX   TAKE ONE (1) TABLET BY MOUTH TWO TIMES A DAY     glimepiride 2 MG tablet  Commonly known as: AMARYL   Take 1 tablet by mouth daily (before breakfast).     HumaLOG KwikPen 200 UNIT/ML pen-injector   Generic drug: insulin lispro  PRIME 2 UNITS BEFORE EACH DOSE.PATIENT TO TAKE THIS SLIDING SCALE T.I.D. WITH MEALS. BLOOD SUGAR 100-200 TAKE 13 UNITS, BLOOD SUGAR 200-250 TAKE 16 UNITS, BS> 251 TAKE  WILL TAKE 19 UNITS MDD 57 UNITS     Insulin Pen Needle 31G X 5 MM Misc  Commonly known as: Sure Comfort Pen Needles   USE TO INJECT INSULIN FIVE (5) TIMES DAILY. REMOVE NEEDLE COVER(S) TO EXPOSE NEEDLE BEFORE INJECTING.     isosorbide mononitrate 30 MG 24 hr tablet  Commonly known as: IMDUR   TAKE THREE (3) TABLETS BY MOUTH DAILY.     Jardiance 10 MG tablet   Generic drug: empagliflozin  TAKE ONE (1) TABLET BY MOUTH DAILY (BEFORE BREAKFAST).     Lantus SoloStar 100 UNIT/ML pen-injector   Generic drug: insulin glargine  Inject 70 Units into the skin daily. Prime 2 units before each dose.     metformin 1000 MG tablet  Commonly known as: GLUCOPHAGE   TAKE ONE (1) TABLET BY MOUTH DAILY (WITH BREAKFAST).     metoPROLOL succinate 25 MG 24 hr tablet  Commonly known as: Toprol XL   Take 1 tablet by mouth daily.     OneTouch Ultra test strip   Generic drug: blood glucose  TEST BLOOD SUGAR THREE (3) TIMES A DAY     pantoprazole 40 MG tablet  Commonly known as: PROTONIX   TAKE ONE (1) TABLET BY MOUTH DAILY.     prednisoLONE acetate 1 % ophthalmic suspension  Commonly known as: PRED FORTE   Place 1 drop into left eye in the morning and 1 drop at noon and 1 drop in the evening.     sacubitril-valsartan 24-26 MG per tablet  Commonly known as: ENTRESTO   Take 1 tablet by mouth in the morning and 1 tablet in the evening.     sertraline 100 MG tablet  Commonly known as: ZOLOFT   TAKE ONE (1) TABLET BY MOUTH DAILY.     terazosin 5 MG capsule  Commonly known as: HYTRIN   TAKE ONE (1) CAPSULE BY MOUTH DAILY.     Trulicity 4.5 MG/0.5ML pen-injector   Generic drug: dulaglutide  Inject 4.5 mg into the skin every 7 days. Indications: Type 2 Diabetes Inject into the skin on Mondays.  Comment: for 7/8     Vitamin D 50 mcg (2,000 units) tablet   Take 1 tablet by mouth daily.  Comment: 50 mcg = 2,000 units                For Discharge Medications: see after visit summary for full list     Tests Pending or Requiring Follow Up:      None.    Needs Further Goals of Care Discussion:   No   Home Health Referral:   No       FOLLOW UP APPOINTMENTS   Cristobal Puga DO  [address]  [address]  [facility] [address]  [phone]    Follow up on 3/7/2024  [facility]/hospital follow up appointment @ 1:20pm    DISCHARGE INSTRUCTIONS     Activity Instructions: Normal activity as tolerated    HOSPITAL COURSE   Admission Narrative  Efrain Persaud is a 60 year old male with a past medical history significant for CARLOTTA on CPAP, morbid obesity, CAD s/p CABG. Ischemic Cardiomyopathy, hypertension, paroxysmal atrial fibrillation and HYPERLIPIDEMIA who presented to [facility] on 2/24 with right shoulder pain with paresthesia and no weakness. On presentation to the ED, patient was noted to have an elevated troponin of 117, lactic acid 2.5,  and EKG with NSR and flattening of ST segment in the inferior leads. His pain improved with Nitroglycerin and ASA and Cardiology was consulted to aid with further management. On admission, patient underwent XR of the right shoulder with findings of degenerative AC joint with hypertrophic osteoarthropathy. He worked with OT who felt he would benefit from outpatient therapies. TTE was ordered and was unchanged from prior echo. Pain management was consulted and he was deemed to not be an SUELLEN candidate due to recent plavix use. He remained hemodynamically and clinically stable and was discharged home. He will continue therapy outpatient for his shoulder pain and can follow up with pain management clinic outpatient if pain persists.         Consultants:  IP CONSULT TO CARDIOLOGY  IP CONSULT TO CARDIOLOGY  IP CONSULT TO CARDIOLOGY  IP CONSULT TO PAIN MANAGEMENT     Surgical Procedures:       DISCHARGE PHYSICAL EXAM     Blood pressure (!) 148/80, pulse 72, temperature 98.9 °F (37.2 °C), temperature source Oral, resp. rate 16, height 5' 8\" (1.727 m), weight 117.9 kg (260 lb), SpO2 98 %.     Physical Exam  Constitutional:       General: He is  not in acute distress.     Appearance: He is obese.   HENT:      Head: Normocephalic and atraumatic.      Mouth/Throat:      Mouth: Mucous membranes are moist.   Eyes:      Conjunctiva/sclera: Conjunctivae normal.   Cardiovascular:      Pulses: Normal pulses.   Pulmonary:      Effort: Pulmonary effort is normal. No respiratory distress.      Breath sounds: Normal breath sounds. No wheezing.   Abdominal:      General: Bowel sounds are normal. There is no distension.      Tenderness: There is no abdominal tenderness. There is no guarding.   Musculoskeletal:         General: Normal range of motion.      Right shoulder: No tenderness. Normal range of motion. Normal strength. Normal pulse.      Comments: Range of motion in the right shoulder appears intact. He has a positive Neer's test   Skin:     General: Skin is warm.   Neurological:      General: No focal deficit present.      Mental Status: He is alert and oriented to person, place, and time. Mental status is at baseline.      Cranial Nerves: No cranial nerve deficit.   Psychiatric:         Mood and Affect: Mood normal.         Thought Content: Thought content normal.     Wt Readings from Last 3 Encounters:   02/26/24 117.9 kg (260 lb)   02/09/24 115.2 kg (253 lb 15.5 oz)   01/22/24 117.9 kg (260 lb)     SIGNIFICANT DIAGNOSTIC STUDIES     Laboratory values:   Recent Labs   Lab 02/25/24  0455 02/24/24  1224   WBC 6.9 5.5   HGB 13.5 13.8   HCT 42.1 44.1    213         Recent Labs   Lab 02/26/24  0457 02/25/24  0455 02/24/24  1224   SODIUM  --  137 136   POTASSIUM 4.0 4.0 4.2   CHLORIDE  --  106 104   CO2  --  26 25   CALCIUM  --  9.3 9.6   GLUCOSE  --  218* 367*   BUN  --  12 16   CREATININE  --  0.70 0.77   MG  --  1.8  --         Recent Labs   Lab 02/25/24  0455   ALBUMIN 3.4*   AST 14   GPT 27   BILIRUBIN 0.4       Time taken to coordinate discharge care:  Discharge Time: More then 30 minutes  Discharge instructions, medications and followup appointment were  discussed with the patient and after visit summary was given.     DO SASHA Choe Hospitalist              within normal limits

## 2024-03-06 ENCOUNTER — APPOINTMENT (OUTPATIENT)
Dept: BARIATRICS/WEIGHT MGMT | Facility: CLINIC | Age: 64
End: 2024-03-06
Payer: COMMERCIAL

## 2024-03-06 ENCOUNTER — OUTPATIENT (OUTPATIENT)
Dept: OUTPATIENT SERVICES | Facility: HOSPITAL | Age: 64
LOS: 1 days | End: 2024-03-06
Payer: COMMERCIAL

## 2024-03-06 DIAGNOSIS — Z98.890 OTHER SPECIFIED POSTPROCEDURAL STATES: Chronic | ICD-10-CM

## 2024-03-06 DIAGNOSIS — Z96.651 PRESENCE OF RIGHT ARTIFICIAL KNEE JOINT: Chronic | ICD-10-CM

## 2024-03-06 DIAGNOSIS — I10 ESSENTIAL (PRIMARY) HYPERTENSION: ICD-10-CM

## 2024-03-06 PROCEDURE — G0463: CPT

## 2024-03-06 PROCEDURE — 99213 OFFICE O/P EST LOW 20 MIN: CPT | Mod: 95

## 2024-03-06 NOTE — ASU PATIENT PROFILE, ADULT - REASON FOR ADMISSION, PROFILE
Taylor Garza is a 47 year old female presents to the Urgent Care clinic today with complaints of migraine that started 2 days ago, the pt has slight dizziness due to headache, the pt is ambulatory with a steady.  Light sensative  No N/V  Also skin issue to legs and neck, the  pt has an appointment with derm in 2 weeks      Duration: 2 days    Medication(s) used and last dose Imitrex taken at 430am today     Standing Orders Obtained:  none    Allergies Reviewed    Triage completed, patient will lobby       EGD

## 2024-03-07 RX ORDER — TIRZEPATIDE 5 MG/.5ML
5 INJECTION, SOLUTION SUBCUTANEOUS
Qty: 12 | Refills: 1 | Status: ACTIVE | COMMUNITY
Start: 2024-01-17 | End: 1900-01-01

## 2024-03-07 NOTE — ASSESSMENT
[FreeTextEntry1] : Recommend the following:   whole foods based eating strategy limiting refined carbs and added fats will do this conjointly with wife start mounjaro 5mg in lieu of trulicity    rtc in 6 months.

## 2024-03-07 NOTE — HISTORY OF PRESENT ILLNESS
[FreeTextEntry1] : 61 yo man with obesity and DM2  5'9 today's weight = 268 lbs (unchanged from last 2 visits) reports that he continues to be hungrier and eat more walking a bit with granddaughter at home but not a lot of added exercise he never got  mounjaro - but it was approved by insurance - remains on trulicity 3mg otherwise without complaints today.

## 2024-03-12 RX ORDER — DULAGLUTIDE 3 MG/.5ML
3 INJECTION, SOLUTION SUBCUTANEOUS
Qty: 6 | Refills: 3 | Status: DISCONTINUED | COMMUNITY
Start: 2023-07-26 | End: 2024-03-12

## 2024-03-19 DIAGNOSIS — E11.69 TYPE 2 DIABETES MELLITUS WITH OTHER SPECIFIED COMPLICATION: ICD-10-CM

## 2024-03-19 DIAGNOSIS — E66.01 MORBID (SEVERE) OBESITY DUE TO EXCESS CALORIES: ICD-10-CM

## 2024-03-19 DIAGNOSIS — E66.9 OBESITY, UNSPECIFIED: ICD-10-CM

## 2024-03-21 NOTE — ASU PATIENT PROFILE, ADULT - PAIN CHRONIC, PROFILE
Problem: Pain  Goal: Verbalizes/displays adequate comfort level or baseline comfort level  Outcome: Progressing   Pt endorsing pain to mid and lower back. Being treated with PRN pain medication, rest, and frequent repositioning with pillow support for comfort and pressure relief. Pt reports some relief from pain with above interventions.      Problem: Safety - Adult  Goal: Free from fall injury  Outcome: Progressing  All fall precautions in place. Bed locked and in lowest position with alarm on. Overbed table and personal belonings within reach. Call light within reach and patient instructed to use call light for assistance. Non-skid socks on.      no

## 2024-03-30 NOTE — H&P PST ADULT - NEGATIVE ALLERGY TYPES
OFFICE VISIT      Patient: Solis Dewitt Date of Service: 3/29/2024    : 1942 MRN: [id number]     SUBJECTIVE:     Chief Complaint   Patient presents with    Pre-Op Exam     RIGHT TOTAL KNEE ARTHROPLASTY  On 04/15/2024 by Dr. Khan at [facility]       HISTORY OF PRESENT ILLNESS:  Solis Dewitt is a 82 year old male who presents today for a pre-operative exam.     The recording was initiated after a verbal consent was obtained from the patient to record this visit for documentation in their clinical record.    Accompanied by wife.     Chronic systolic heart failure (CMD):  On Lasix 20 mg as needed. On Losartan 50 mg and Coreg 25 mg. He visited Dr. Styles for atherosclerosis of heart. Last stress test was normal. Had no angina. Was advised to continue current medications. EKG was normal. Heart failure is under control. Dr. Styles increased his Losartan from 25 mg to 50 mg.     Chronic obstructive pulmonary disease, unspecified COPD type (CMD):  Has no breathing issues. Need labs.     Mild episode of recurrent major depressive disorder (CMD):  His depression is better. He is worried about his pain.     Diabetic polyneuropathy associated with type 2 diabetes mellitus (CMD):  On Metformin 1000 mg and Glimepiride 1 mg. He has pain in his legs and calf muscles. Due for labs. Inquires about medications for neuropathy. Has swelling on his ankles and feet for two days.     Long term (current) use of anticoagulants:  On Xarelto 20 mg.     Paroxysmal atrial fibrillation (CMD):  On Xarelto 20 mg. Has no fresh complaints.     Pre-op exam:  The patient is scheduled for right total knee arthroplasty on 04/15/2024 by Dr. Khan at [facility]. Need EKG, labs, CBC, MRSA, blood group, urine culture tests, PT/INR, PTT, etc.      Osteoarthritis of right knee, unspecified osteoarthritis type:  Complains about constant pain. He takes Tylenol and Ibuprofen. He tried Gabapentin, but was not benefited by any medication. He  needs stronger medication. Left foot is worse with numbness in calf muscles. Denies getting steroids lately. Needs walker.     Additional comments:  His weight was 190 lb at Dr. Styles's office, he is 188 lb now but states his weight is usually around 185 lb.   His systolic blood pressure measured today is slightly high with diastolic 60 mmHg. Reports his diastolic pressure is in the range of 90-70 mmHg at home.   Denies abdominal pain.   Has an appointment scheduled with me on 04/22/2024     MEDICATIONS:  Current Outpatient Medications   Medication Sig    HYDROcodone-acetaminophen (NORCO) 5-325 MG per tablet Take 1 tablet by mouth every 6 hours as needed for Pain.    carvedilol (COREG) 25 MG tablet Take 25 mg by mouth in the morning and 25 mg in the evening. Take with meals.    brimonidine (ALPHAGAN) 0.2 % ophthalmic solution [None received]    carvedilol (Coreg) 25 MG tablet Take 1 tablet by mouth in the morning and 1 tablet in the evening. Take with meals.    acetaminophen (TYLENOL) 325 MG tablet Take 650 mg by mouth every 4 hours as needed for Pain.    ibuprofen (MOTRIN) 200 MG tablet Take 200 mg by mouth every 6 hours as needed for Pain.    clonazePAM (KlonoPIN) 0.5 MG tablet TAKE 1 TABLET BY MOUTH NIGHTLY AS NEEDED FOR ANXIETY.    losartan (COZAAR) 50 MG tablet Take 1 tablet by mouth daily.    atorvastatin (LIPITOR) 20 MG tablet Take 1 tablet by mouth daily.    TRUEplus Lancets 33G Misc TEST BLOOD SUGAR TWICE DAILY    gabapentin (NEURONTIN) 100 MG capsule TAKE 1 CAPSULE THREE TIMES DAILY    glimepiride (AMARYL) 1 MG tablet TAKE 1 TABLET BY MOUTH DAILY (BEFORE BREAKFAST). (Patient taking differently: Take 1 mg by mouth daily (before breakfast). Per patient takes as needed when blood sugar high patient states when 165 and over)    metformin (GLUCOPHAGE) 1000 MG tablet TAKE 1 TABLET TWICE DAILY    AMIODarone (PACERONE) 200 MG tablet TAKE 1 TABLET EVERY DAY    furosemide (LASIX) 20 MG tablet TAKE 1 TABLET EVERY  DAY (Patient taking differently: as needed.)    Xarelto 20 MG Tab TAKE 1 TABLET DAILY WITH DINNER    MAGNESIUM OXIDE PO Take 200 mg by mouth daily. As needed per patient    latanoprost (XALATAN) 0.005 % ophthalmic solution Place 1 drop into right eye daily.    doxazosin (CARDURA) 4 MG tablet Take 4 mg by mouth daily.     finasteride (PROSCAR) 5 MG tablet Take 5 mg by mouth daily.    timolol (TIMOPTIC) 0.5 % ophthalmic solution Place 1 drop into left eye daily.     No current facility-administered medications for this visit.       ALLERGIES:  ALLERGIES:  No Known Allergies    Immunization History   Administered Date(s) Administered    Influenza, High Dose quadrivalent, preserve-free 11/18/2020, 10/25/2021, 09/22/2022, 12/04/2023    Pneumococcal Conjugate 13 Valent Vacc (Prevnar 13) 10/25/2021    Pneumococcal Conjugate 20 Valent Vacc (Prevnar 20) 01/19/2023       PAST MEDICAL HISTORY:  Past Medical History:   Diagnosis Date    3-vessel CAD 11/18/2009    A-fib (CMD)     Ambulates with cane     Angina pectoris (CMD) 04/05/2019    Anxiety     Arthritis of knee 02/26/2018    Benign prostatic hyperplasia 11/18/2009    Choroidal nevus of right eye     Chronic systolic congestive heart failure (CMD) 04/05/2019    Controlled type 2 diabetes with renal manifestation 11/29/2011    Coronary artery disease     Cryptogenic stroke (CMD) 08/08/2017    CVA (cerebrovascular accident) (CMD) 09/01/2016    NO residuals    Diabetic polyneuropathy associated with type 2 diabetes mellitus (CMD) 04/05/2019    Diverticulosis     Dry eye syndrome     Elevated prostate specific antigen (PSA) 06/14/2011    Essential hypertension 03/22/2019    Exudative age related macular degeneration (CMD) 05/05/2016    Exudative age-related macular degeneration, unspecified eye, stage unspecified (CMD) 05/06/2019    Frequent PVCs 09/12/2014    Gastroesophageal reflux disease     Herpes zoster 09/26/2016    [place] (hard of hearing)     Hypercholesterolemia  2009    Leg swelling     ankle    Major depressive disorder, recurrent, unspecified (CMD) 2016    Nephrolithiasis 2010    Neuropathy     Palpitations 03/10/2011    Paroxysmal A-fib (CMD) 2019    Sciatica 2016    Stage 2 chronic kidney disease 09/10/2012    Testicular hypofunction 2012    Vitamin D deficiency 2011    Wears dentures     denture and partial    Wears glasses        PAST SURGICAL HISTORY:  Past Surgical History:   Procedure Laterality Date    Abdomen surgery  1998    COLECTOMY for diverticulitis colon surgery 25 years ago    Ablation - atrial flutter  2019    Appendectomy      5 YEARS OLD    Cholecystectomy      Coronary artery bypass graft      Loop recorder removal  2019    Open access colonoscopy      Remv 2nd cataract,corn-scler sectn Right     Tonsillectomy         FAMILY HISTORY:  Family History   Problem Relation Age of Onset    Cancer, Stomach Mother          at 93 years.    Heart disease Mother     Myocardial Infarction Father          at 69 years    Myocardial Infarction Sister     Heart disease Other        SOCIAL HISTORY:  Social History     Tobacco Use   Smoking Status Former    Types: Cigarettes    Start date:    Smokeless Tobacco Never     Social History     Substance and Sexual Activity   Alcohol Use No       Review of Systems      OBJECTIVE:     Visit Vitals  /60   Pulse (!) 57   Temp 96.3 °F (35.7 °C)   Ht 5' 10\"   Wt 85.3 kg (188 lb)   SpO2 97%   BMI 26.98 kg/m²       Physical Exam      Constitutional: alert, in no acute distress and current vital signs reviewed.  Eyes: no discharge, no eyelid swelling and the sclera were normal.  Pulmonary: breath sounds clear to auscultation bilaterally, but no respiratory distress and normal respiratory rate and effort.  Cardiovascular:normal rate, regular rhythm, normal S1, normal S2 and edema was not present in the lower extremities.  Abdomen: soft and  nontender.  Psychiatric: alert and awake, interactive and mood/affect were appropriate.  Skin, Hair, Nails: normal skin color and pigmentation.    DIAGNOSTIC STUDIES:   LAB RESULTS:    Hospital Outpatient Visit on 03/18/2024   Component Date Value Ref Range Status    COLOR, URINALYSIS 03/18/2024 Straw   Final    APPEARANCE, URINALYSIS 03/18/2024 Clear   Final    GLUCOSE, URINALYSIS 03/18/2024 Negative  Negative mg/dL Final    BILIRUBIN, URINALYSIS 03/18/2024 Negative  Negative Final    KETONES, URINALYSIS 03/18/2024 Negative  Negative mg/dL Final    SPECIFIC GRAVITY, URINALYSIS 03/18/2024 1.010  1.005 - 1.030 Final    OCCULT BLOOD, URINALYSIS 03/18/2024 Negative  Negative Final    PH, URINALYSIS 03/18/2024 5.5  5.0 - 7.0 Final    PROTEIN, URINALYSIS 03/18/2024 Negative  Negative mg/dL Final    UROBILINOGEN, URINALYSIS 03/18/2024 0.2  0.2, 1.0 mg/dL Final    NITRITE, URINALYSIS 03/18/2024 Negative  Negative Final    LEUKOCYTE ESTERASE, URINALYSIS 03/18/2024 Negative  Negative Final    Ventricular Rate EKG/Min (BPM) 03/18/2024 64   Final    QRS-Interval (MSEC) 03/18/2024 108   Final    QT-Interval (MSEC) 03/18/2024 450   Final    QTc 03/18/2024 464   Final    R Axis (Degrees) 03/18/2024 -3   Final    T Axis (Degrees) 03/18/2024 51   Final    REPORT TEXT 03/18/2024    Final                    Value:Sinus rhythm  versus  ectopic atrial rhythm  with frequent  premature ventricular complexes  Cannot rule out  Anterior infarct  (cited on or before  24-JUN-2021)  Consider  Inferior infarct  Abnormal ECG  Confirmed by BERTHA SALTER, [facility] ([id number]) on 3/18/2024 1:56:03 PM           ASSESSMENT AND PLAN:   This is a 82 year old year-old male who presents with     1. Chronic systolic heart failure (CMD)    2. Chronic obstructive pulmonary disease, unspecified COPD type (CMD)    3. Mild episode of recurrent major depressive disorder (CMD)    4. Diabetic polyneuropathy associated with type 2 diabetes mellitus (CMD)    5. Long term  (current) use of anticoagulants    6. Paroxysmal atrial fibrillation (CMD)    7. Pre-op exam    8. Osteoarthritis of right knee, unspecified osteoarthritis type        Orders Placed This Encounter    Lipid Panel With Reflex    Glycohemoglobin    Partial Thromboplastin Time (PTT)    Urinalysis & Reflex Microscopy With Culture If Indicated    Prothrombin Time (INR/PT)    Comprehensive Metabolic Panel    CBC with Automated Differential    SERVICE TO HOME CARE DME    SERVICE TO HOME CARE DME    Staphylococcus aureus Methicillin Sensitive (MSSA) and Methicillin Resistant (MRSA) PCR    HYDROcodone-acetaminophen (NORCO) 5-325 MG per tablet       PLAN:  Chronic systolic heart failure (CMD):  Controlled.   Reviewed and discussed notes from Dr. Styles.  Continue current management.     Chronic obstructive pulmonary disease, unspecified COPD type (CMD):  Ordered labs. Refer to orders.   Ordered Staphylococcus aureus Methicillin Sensitive (MSSA) and Methicillin Resistant (MRSA) PCR. Refer to orders.   Ordered Partial Thromboplastin Time (PTT). Refer to orders.   Ordered Prothrombin Time (INR/PT). Refer to orders.   Continue to monitor.    Mild episode of recurrent major depressive disorder (CMD):  Ordered labs. Refer to orders.   Ordered Staphylococcus aureus Methicillin Sensitive (MSSA) and Methicillin Resistant (MRSA) PCR. Refer to orders.   Ordered Partial Thromboplastin Time (PTT). Refer to orders.   Ordered Prothrombin Time (INR/PT). Refer to orders.   Continue current management.     Diabetic polyneuropathy associated with type 2 diabetes mellitus (CMD):  Ordered labs. Refer to orders.   Ordered Staphylococcus aureus Methicillin Sensitive (MSSA) and Methicillin Resistant (MRSA) PCR. Refer to orders.   Ordered Partial Thromboplastin Time (PTT). Refer to orders.   Ordered Prothrombin Time (INR/PT). Refer to orders.   Informed he is not insulin dependent.  Continue current management.     Long term (current) use of  anticoagulants:  Hold Xarelto before surgery.   Restart blood thinner after the discharge.   Continue current management.     Paroxysmal atrial fibrillation (CMD):  Controlled.   Continue current management.     Pre-op exam:  Patient is a low risk for surgery.  Patient is medically optimized for surgery.  Please send copies to the referring physician and pre-surgical testing.  EKG is normal  Cleared for surgery.  EKG showed sinus rhythm.   Reviewed and discussed medical and surgical history.  Hold Narcotics and Xarelto 3 days prior to surgery.   Hold Metformin 1000 mg and Glimepiride 1 mg on the day of surgery.  Continue other medications.  Restart blood thinner after the discharge.     Osteoarthritis of right knee, unspecified osteoarthritis type:  Prescribed Hydrocodone-acetaminophen (Norco) 5-325 mg per tablet.   Provided service to Home Care.   Ordered walker.    The blood pressure is controlled.  The blood pressure rechecked by me today in the office is 126/62 mmHg.  The cholesterol levels are controlled.    Follow-up on 04/22/2024 as scheduled.     Instructions provided as documented in the AVS.  Medication use,effects and side effects discussed in detail with patient.  The patient indicated understanding of the diagnosis and agreed with the plan of care.  Medical compliance with plan discussed and risks of non-compliance reviewed.    Patient education completed on disease process, etiology & prognosis.    Patient expresses understanding of the plan.    Proper usage and side effects of medications reviewed & discussed.    Refer to orders.    Return to clinic as clinically indicated as discussed with patient who verbalized understanding of & agreement with the plan.    ITsering, have created a visit summary document based on the audio recording between Mary Blanco MD and this patient for the physician to review, edit as needed, and authenticate.  Creation Date: 3/29/2024      I have reviewed and  edited the visit summary above and attest that it is accurate.  I am the author of this note      no indoor environmental allergies

## 2024-04-05 LAB
25(OH)D3 SERPL-MCNC: 36.1 NG/ML
ALBUMIN SERPL ELPH-MCNC: 4.8 G/DL
ALP BLD-CCNC: 45 U/L
ALT SERPL-CCNC: 40 U/L
ANION GAP SERPL CALC-SCNC: 14 MMOL/L
AST SERPL-CCNC: 26 U/L
BILIRUB SERPL-MCNC: 0.4 MG/DL
BUN SERPL-MCNC: 19 MG/DL
CALCIUM SERPL-MCNC: 10.2 MG/DL
CHLORIDE SERPL-SCNC: 106 MMOL/L
CHOLEST SERPL-MCNC: 195 MG/DL
CO2 SERPL-SCNC: 23 MMOL/L
CREAT SERPL-MCNC: 0.91 MG/DL
EGFR: 94 ML/MIN/1.73M2
ESTIMATED AVERAGE GLUCOSE: 111 MG/DL
FRUCTOSAMINE SERPL-MCNC: 234 UMOL/L
GLUCOSE SERPL-MCNC: 108 MG/DL
HBA1C MFR BLD HPLC: 5.5 %
HDLC SERPL-MCNC: 32 MG/DL
LDLC SERPL CALC-MCNC: 120 MG/DL
NONHDLC SERPL-MCNC: 163 MG/DL
POTASSIUM SERPL-SCNC: 4.5 MMOL/L
PROT SERPL-MCNC: 7.9 G/DL
SODIUM SERPL-SCNC: 143 MMOL/L
TRIGL SERPL-MCNC: 244 MG/DL

## 2024-04-12 NOTE — ASU PREOP CHECKLIST - WEIGHT IN LBS
[EKG obtained to assist in diagnosis and management of assessed problem(s)] : EKG obtained to assist in diagnosis and management of assessed problem(s) [FreeTextEntry1] : Tom is a 57 year old man with a history of ESRD on HD MWF(uncontrolled HTN), renal transplant 3/18/14, 12/2014 failed renal transplant (on transplant list at [place]),left AV fistula, s/p stent to the fistula, obesity, hypertension, hyperlipidemia who presented to the office for post hospitalization followup.   I have reviewed all of the medical records available to me at this time from his admission at length, including consultations, laboratory records, radiologic records, medication administration records and discharge summary. He arrives today in no acute distress.  He is euvolemic on exam.  He is an HD pt, M/W/F's. His blood pressure had improved some by the conclusion of the visit. ECG illustrates sinus rhythm today. Mr Newsome is having episodes of PAF w/RVR.  We have discussed the natural history again of AF and its progressive nature. Rhythm vs rate control methods.  We have also discussed the risk associated with thyroid disease.  He was found to be hyperthyroid at his recent ED visit. For now , he will continue rate control with diltiazem, we will increase dose to 240mg daily for more HR and added B/P management. He will continue Eliquis for thromboembolic prevention.  He has follow-up scheduled with EP next week but we will try to escalate an appointment with endocrinology for further management of hyperthyroidism.  Echocardiogram demonstrates a preserved/hyperdynamic LV. He will continue all other management for B/P with carvedilol 25mg BID, hydralazine 25mg TID.  continue atorvastatin 10mg for goal LDL <70  He will follow-up again in one month.  279.9

## 2024-04-23 ENCOUNTER — OUTPATIENT (OUTPATIENT)
Dept: OUTPATIENT SERVICES | Facility: HOSPITAL | Age: 64
LOS: 1 days | End: 2024-04-23
Payer: COMMERCIAL

## 2024-04-23 ENCOUNTER — APPOINTMENT (OUTPATIENT)
Dept: BARIATRICS/WEIGHT MGMT | Facility: CLINIC | Age: 64
End: 2024-04-23
Payer: COMMERCIAL

## 2024-04-23 VITALS
BODY MASS INDEX: 38.72 KG/M2 | HEART RATE: 94 BPM | SYSTOLIC BLOOD PRESSURE: 120 MMHG | OXYGEN SATURATION: 98 % | HEIGHT: 69 IN | WEIGHT: 261.4 LBS | DIASTOLIC BLOOD PRESSURE: 86 MMHG

## 2024-04-23 DIAGNOSIS — Z98.890 OTHER SPECIFIED POSTPROCEDURAL STATES: Chronic | ICD-10-CM

## 2024-04-23 DIAGNOSIS — I10 ESSENTIAL (PRIMARY) HYPERTENSION: ICD-10-CM

## 2024-04-23 DIAGNOSIS — Z96.651 PRESENCE OF RIGHT ARTIFICIAL KNEE JOINT: Chronic | ICD-10-CM

## 2024-04-23 PROCEDURE — 99213 OFFICE O/P EST LOW 20 MIN: CPT

## 2024-04-23 PROCEDURE — G0463: CPT

## 2024-04-23 NOTE — HISTORY OF PRESENT ILLNESS
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

## 2024-04-23 NOTE — ASSESSMENT
[FreeTextEntry1] : Recommend the following:   whole foods based eating strategy limiting refined carbs and added fats will do this conjointly with wife ok to increase mounjaro to 10mg     rtc in 6 weeks

## 2024-04-29 DIAGNOSIS — E11.9 TYPE 2 DIABETES MELLITUS WITHOUT COMPLICATIONS: ICD-10-CM

## 2024-04-29 DIAGNOSIS — E66.01 MORBID (SEVERE) OBESITY DUE TO EXCESS CALORIES: ICD-10-CM

## 2024-05-24 RX ORDER — TIRZEPATIDE 10 MG/.5ML
10 INJECTION, SOLUTION SUBCUTANEOUS
Qty: 3 | Refills: 1 | Status: ACTIVE | COMMUNITY
Start: 2024-04-23 | End: 1900-01-01

## 2024-05-24 NOTE — ASU PREOP CHECKLIST - NEEDLE GAUGE
Chief Complaint   Patient presents with    MUMTAZ Cerrato, is participating in a video visit today regarding **parastomal hernia, having flap surgery for pressure sore with Sally.       There were no vitals filed for this visit.    There is no height or weight on file to calculate BMI.      Caity Steward LPN     22

## 2024-06-04 ENCOUNTER — APPOINTMENT (OUTPATIENT)
Dept: GASTROENTEROLOGY | Facility: CLINIC | Age: 64
End: 2024-06-04
Payer: COMMERCIAL

## 2024-06-04 VITALS
OXYGEN SATURATION: 98 % | SYSTOLIC BLOOD PRESSURE: 120 MMHG | BODY MASS INDEX: 37.77 KG/M2 | WEIGHT: 255 LBS | DIASTOLIC BLOOD PRESSURE: 78 MMHG | HEART RATE: 97 BPM | HEIGHT: 69 IN

## 2024-06-04 PROCEDURE — G2211 COMPLEX E/M VISIT ADD ON: CPT | Mod: NC

## 2024-06-04 PROCEDURE — 99215 OFFICE O/P EST HI 40 MIN: CPT

## 2024-06-04 NOTE — HISTORY OF PRESENT ILLNESS
[FreeTextEntry1] : 61 yo M pmh obesity, h/o HP s/p eradication, GERD c/b SSBE with LGD s/p Ablation with Chandu Stewart MD and UTD with surveillance, colon polyps, who presents now for follow up. Now on Trulicity with improving weight loss. Bloating and bowel habits are both improving with the weight loss and overall he is doing quite well.  Impression: 1) Bloating - improved 2) Anal itching 3) GERD c/b SSBE with LGD s/p RFA x 1 -> negative dysplasia on last EGD - due 12/2022 4) Colon Polyps - repeat 2027 5) Morbid Obesity -> On Trulicity with steady weight loss 6) H/o HP s/p eradication 7) BRBPR - resolved 8) Intermittent constipation - controlled  Plan: 1) Can continue with anal hygiene and prn meds 2) c/w trulicity and f/u with Mich RODRÍGUEZ 3) Lifelong PPI 4) Planned for EGD for surveillance with Pat 5) Continue with Miralax + Metamucil - okay to ween down on miralax if not needed as much 6) Colon 2027 7) All discussed at length. All questions answered. Plan agreed upon. 8) RPV 1 year. ---------------------------------------------------------------------------------  Currently:  Obesity: Doing well - has lost 30 lbs Increased [place] recently  Stomach Pains: Had 1 episode of pain followed by vomiting This was after a heavy deep fried food Otherwise doing okay There is still some bloating  Constipation Well controlled No blood in stool, no more hemorrhoids, itching, fissures only on fiber  Barretts Esophagus:  Due for EGD in 12/2023 [de-identified] : EGD 12/2023 BE 38-40 cm, C0M2 Bx taken  Path: Surgical Pathology Report - Auth (Verified)  Specimen(s) Submitted  1-Esophagus at 40cm biopsy 2-Esophagus at 39cm biopsy 3-Esophagus at 38cm biopsy  Final Diagnosis 1. Esophagus, 40 cm biopsy  - Gastric cardia type mucosa with chronic inflammation - Negative for intestinal metaplasia or dysplasia  2. Esophagus, 39 cm biopsy - Gastroesophageal junctional mucosa showing intestinal metaplasia consistent with Gill's esophagus - Negative for dysplasia  3. Esophagus, 38 cm biopsy - Esophageal type squamous epithelium with no significant histopathological change

## 2024-06-04 NOTE — PHYSICAL EXAM
[Alert] : alert [Normal Voice/Communication] : normal voice/communication [No Acute Distress] : no acute distress [Obese (BMI >= 30)] : obese (BMI >= 30) [Sclera] : the sclera and conjunctiva were normal [Hearing Threshold Finger Rub Not [place]] : hearing was normal [Normal Lips/Gums] : the lips and gums were normal [Oropharynx] : the oropharynx was normal [Normal Appearance] : the appearance of the neck was normal [No Neck Mass] : no neck mass was observed [No Respiratory Distress] : no respiratory distress [No Acc Muscle Use] : no accessory muscle use [Respiration, Rhythm And Depth] : normal respiratory rhythm and effort [Auscultation Breath Sounds / Voice Sounds] : lungs were clear to auscultation bilaterally [Heart Rate And Rhythm] : heart rate was normal and rhythm regular [Normal S1, S2] : normal S1 and S2 [Murmurs] : no murmurs [Bowel Sounds] : normal bowel sounds [Abdomen Tenderness] : non-tender [No Masses] : no abdominal mass palpated [Abdomen Soft] : soft [] : no hepatosplenomegaly [Cervical Lymph Nodes Enlarged Posterior Bilaterally] : no posterior cervical lymphadenopathy [Supraclavicular Lymph Nodes Enlarged Bilaterally] : no supraclavicular lymphadenopathy [Axillary Lymph Nodes Enlarged Bilaterally] : no axillary lymphadenopathy [Abnormal Walk] : normal gait [Involuntary Movements] : no involuntary movements were seen [No Focal Deficits] : no focal deficits [Oriented To Time, Place, And Person] : oriented to person, place, and time [Normal Affect] : the affect was normal [Normal Mood] : the mood was normal [de-identified] : non rigid

## 2024-06-04 NOTE — ASSESSMENT
[FreeTextEntry1] : - egd december - ppi lifelong - add on meds for bloating - c/w weight loss - trial of hydrocortisone for anal itching  62 yo M pmh obesity, h/o HP s/p eradication, GERD c/b SSBE with LGD s/p Ablation with Chandu Stewart MD and UTD with surveillance, colon polyps, who presents now for follow up.  Overall doing really well with continued weight loss.  He will need EGD at end of year.  Some mild anal itching for which he wants to try hydrocortisone cream.  Impression: 1) Bloating - largely resolved 2) Anal itching 3) GERD c/b SSBE with LGD s/p RFA x 1 -> negative dysplasia on last EGD - due this december 4) Colon Polyps - repeat 2027 5) Morbid Obesity -> On Trulicity with steady weight loss - doing really well 6) H/o HP s/p eradication 7) BRBPR - resolved 8) Intermittent constipation - controlled on fiber at this time  Plan: 1) Anal hygiene.  Trial of topical steroid 2) c/w trulicity and f/u with Mich RODRÍGUEZ 3) Lifelong PPI 4) Planned for EGD this December with EGD and WATS 5) Continue with fiber 6) Colon 2027 7) All discussed at length. All questions answered. Plan agreed upon.  Positive encouragement provided 8) RPV 1 year

## 2024-06-06 ENCOUNTER — APPOINTMENT (OUTPATIENT)
Dept: INTERNAL MEDICINE | Facility: CLINIC | Age: 64
End: 2024-06-06
Payer: COMMERCIAL

## 2024-06-06 VITALS
DIASTOLIC BLOOD PRESSURE: 64 MMHG | BODY MASS INDEX: 38.36 KG/M2 | SYSTOLIC BLOOD PRESSURE: 110 MMHG | HEIGHT: 69 IN | RESPIRATION RATE: 16 BRPM | WEIGHT: 259 LBS | OXYGEN SATURATION: 96 % | HEART RATE: 76 BPM

## 2024-06-06 DIAGNOSIS — K21.00 GASTRO-ESOPHAGEAL REFLUX DISEASE WITH ESOPHAGITIS, WITHOUT BLEEDING: ICD-10-CM

## 2024-06-06 DIAGNOSIS — E78.5 HYPERLIPIDEMIA, UNSPECIFIED: ICD-10-CM

## 2024-06-06 DIAGNOSIS — M17.12 UNILATERAL PRIMARY OSTEOARTHRITIS, LEFT KNEE: ICD-10-CM

## 2024-06-06 DIAGNOSIS — K22.710 BARRETT'S ESOPHAGUS WITH LOW GRADE DYSPLASIA: ICD-10-CM

## 2024-06-06 DIAGNOSIS — M17.11 UNILATERAL PRIMARY OSTEOARTHRITIS, RIGHT KNEE: ICD-10-CM

## 2024-06-06 DIAGNOSIS — G25.81 RESTLESS LEGS SYNDROME: ICD-10-CM

## 2024-06-06 DIAGNOSIS — Z86.718 PERSONAL HISTORY OF OTHER VENOUS THROMBOSIS AND EMBOLISM: ICD-10-CM

## 2024-06-06 DIAGNOSIS — M19.90 UNSPECIFIED OSTEOARTHRITIS, UNSPECIFIED SITE: ICD-10-CM

## 2024-06-06 DIAGNOSIS — I87.2 VENOUS INSUFFICIENCY (CHRONIC) (PERIPHERAL): ICD-10-CM

## 2024-06-06 DIAGNOSIS — K62.5 HEMORRHAGE OF ANUS AND RECTUM: ICD-10-CM

## 2024-06-06 DIAGNOSIS — M25.512 PAIN IN LEFT SHOULDER: ICD-10-CM

## 2024-06-06 DIAGNOSIS — J30.2 OTHER SEASONAL ALLERGIC RHINITIS: ICD-10-CM

## 2024-06-06 DIAGNOSIS — Z96.651 PRESENCE OF RIGHT ARTIFICIAL KNEE JOINT: ICD-10-CM

## 2024-06-06 DIAGNOSIS — K59.09 OTHER CONSTIPATION: ICD-10-CM

## 2024-06-06 DIAGNOSIS — K44.9 DIAPHRAGMATIC HERNIA W/OUT OBSTRUCTION OR GANGRENE: ICD-10-CM

## 2024-06-06 DIAGNOSIS — E55.9 VITAMIN D DEFICIENCY, UNSPECIFIED: ICD-10-CM

## 2024-06-06 DIAGNOSIS — G47.33 OBSTRUCTIVE SLEEP APNEA (ADULT) (PEDIATRIC): ICD-10-CM

## 2024-06-06 PROCEDURE — G2211 COMPLEX E/M VISIT ADD ON: CPT | Mod: NC

## 2024-06-06 PROCEDURE — 36415 COLL VENOUS BLD VENIPUNCTURE: CPT

## 2024-06-06 PROCEDURE — 99214 OFFICE O/P EST MOD 30 MIN: CPT

## 2024-06-06 RX ORDER — TRAMADOL HYDROCHLORIDE 50 MG/1
50 TABLET, COATED ORAL
Qty: 60 | Refills: 0 | Status: ACTIVE | COMMUNITY
Start: 2023-05-16 | End: 1900-01-01

## 2024-06-06 RX ORDER — LEVOCETIRIZINE DIHYDROCHLORIDE 5 MG/1
5 TABLET ORAL
Qty: 90 | Refills: 3 | Status: ACTIVE | COMMUNITY
Start: 2024-06-06 | End: 1900-01-01

## 2024-06-06 RX ORDER — FLUTICASONE PROPIONATE 50 UG/1
50 SPRAY, METERED NASAL DAILY
Qty: 1 | Refills: 1 | Status: ACTIVE | COMMUNITY
Start: 2024-06-06 | End: 1900-01-01

## 2024-06-06 NOTE — PHYSICAL EXAM
[No Acute Distress] : no acute distress [Well Nourished] : well nourished [Well Developed] : well developed [Well-Appearing] : well-appearing [Normal Voice/Communication] : normal voice/communication [Normal Sclera/Conjunctiva] : normal sclera/conjunctiva [PERRL] : pupils equal round and reactive to light [EOMI] : extraocular movements intact [Normal Outer Ear/Nose] : the outer ears and nose were normal in appearance [Normal Oropharynx] : the oropharynx was normal [No JVD] : no jugular venous distention [No Lymphadenopathy] : no lymphadenopathy [Supple] : supple [Thyroid Normal, No Nodules] : the thyroid was normal and there were no nodules present [No Respiratory Distress] : no respiratory distress  [No Accessory Muscle Use] : no accessory muscle use [Clear to Auscultation] : lungs were clear to auscultation bilaterally [Normal Rate] : normal rate  [Regular Rhythm] : with a regular rhythm [Normal S1, S2] : normal S1 and S2 [No Murmur] : no murmur heard [No Carotid Bruits] : no carotid bruits [No Abdominal Bruit] : a ~M bruit was not heard ~T in the abdomen [Pedal Pulses Present] : the pedal pulses are present [No Edema] : there was no peripheral edema [No Palpable Aorta] : no palpable aorta [Soft] : abdomen soft [Non Tender] : non-tender [Non-distended] : non-distended [No Masses] : no abdominal mass palpated [No HSM] : no HSM [Normal Bowel Sounds] : normal bowel sounds [Normal Posterior Cervical Nodes] : no posterior cervical lymphadenopathy [Normal Anterior Cervical Nodes] : no anterior cervical lymphadenopathy [No CVA Tenderness] : no CVA  tenderness [No Spinal Tenderness] : no spinal tenderness [No Joint Swelling] : no joint swelling [Grossly Normal Strength/Tone] : grossly normal strength/tone [Coordination Grossly Intact] : coordination grossly intact [No Focal Deficits] : no focal deficits [Normal Gait] : normal gait [Speech Grossly Normal] : speech grossly normal [Memory Grossly Normal] : memory grossly normal [Normal Affect] : the affect was normal [Alert and Oriented x3] : oriented to person, place, and time [Normal Mood] : the mood was normal [Normal Insight/Judgement] : insight and judgment were intact [de-identified] : obese

## 2024-06-06 NOTE — HEALTH RISK ASSESSMENT
[Yes] : Yes [Monthly or less (1 pt)] : Monthly or less (1 point) [1 or 2 (0 pts)] : 1 or 2 (0 points) [Never (0 pts)] : Never (0 points) [No] : In the past 12 months have you used drugs other than those required for medical reasons? No [0] : 2) Feeling down, depressed, or hopeless: Not at all (0) [PHQ-2 Negative - No further assessment needed] : PHQ-2 Negative - No further assessment needed [Former] : Former [> 15 Years] : > 15 Years [Audit-CScore] : 1 [[id number]] : 0
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
## 2024-06-07 LAB
ALBUMIN SERPL ELPH-MCNC: 4.8 G/DL
ALP BLD-CCNC: 53 U/L
ALT SERPL-CCNC: 45 U/L
ANION GAP SERPL CALC-SCNC: 13 MMOL/L
APPEARANCE: CLEAR
AST SERPL-CCNC: 37 U/L
BACTERIA: NEGATIVE /HPF
BILIRUB SERPL-MCNC: 0.6 MG/DL
BILIRUBIN URINE: NEGATIVE
BLOOD URINE: NEGATIVE
BUN SERPL-MCNC: 24 MG/DL
CALCIUM SERPL-MCNC: 9.8 MG/DL
CAST: 0 /LPF
CHLORIDE SERPL-SCNC: 104 MMOL/L
CHOLEST SERPL-MCNC: 144 MG/DL
CO2 SERPL-SCNC: 23 MMOL/L
COLOR: NORMAL
CREAT SERPL-MCNC: 1.03 MG/DL
CREAT SPEC-SCNC: 193 MG/DL
EGFR: 81 ML/MIN/1.73M2
EPITHELIAL CELLS: 1 /HPF
ESTIMATED AVERAGE GLUCOSE: 117 MG/DL
FOLATE SERPL-MCNC: 19.4 NG/ML
GLUCOSE QUALITATIVE U: NEGATIVE MG/DL
GLUCOSE SERPL-MCNC: 103 MG/DL
HBA1C MFR BLD HPLC: 5.7 %
HDLC SERPL-MCNC: 43 MG/DL
KETONES URINE: NEGATIVE MG/DL
LDLC SERPL CALC-MCNC: 77 MG/DL
LEUKOCYTE ESTERASE URINE: NEGATIVE
MICROALBUMIN 24H UR DL<=1MG/L-MCNC: 1.4 MG/DL
MICROALBUMIN/CREAT 24H UR-RTO: 7 MG/G
MICROSCOPIC-UA: NORMAL
NITRITE URINE: NEGATIVE
NONHDLC SERPL-MCNC: 101 MG/DL
PH URINE: 5.5
POTASSIUM SERPL-SCNC: 4.5 MMOL/L
PROT SERPL-MCNC: 7.8 G/DL
PROTEIN URINE: NEGATIVE MG/DL
RED BLOOD CELLS URINE: 1 /HPF
SODIUM SERPL-SCNC: 140 MMOL/L
SPECIFIC GRAVITY URINE: >1.03
TRIGL SERPL-MCNC: 134 MG/DL
UROBILINOGEN URINE: 0.2 MG/DL
VIT B12 SERPL-MCNC: 1594 PG/ML
WHITE BLOOD CELLS URINE: 0 /HPF

## 2024-06-13 ENCOUNTER — RX RENEWAL (OUTPATIENT)
Age: 64
End: 2024-06-13

## 2024-06-13 RX ORDER — HYDROCORTISONE 25 MG/G
2.5 CREAM TOPICAL 3 TIMES DAILY
Qty: 28.35 | Refills: 0 | Status: ACTIVE | COMMUNITY
Start: 2024-06-04 | End: 1900-01-01

## 2024-06-14 ENCOUNTER — APPOINTMENT (OUTPATIENT)
Dept: BARIATRICS/WEIGHT MGMT | Facility: CLINIC | Age: 64
End: 2024-06-14
Payer: COMMERCIAL

## 2024-06-14 ENCOUNTER — OUTPATIENT (OUTPATIENT)
Dept: OUTPATIENT SERVICES | Facility: HOSPITAL | Age: 64
LOS: 1 days | End: 2024-06-14
Payer: COMMERCIAL

## 2024-06-14 DIAGNOSIS — Z96.651 PRESENCE OF RIGHT ARTIFICIAL KNEE JOINT: Chronic | ICD-10-CM

## 2024-06-14 DIAGNOSIS — I10 ESSENTIAL (PRIMARY) HYPERTENSION: ICD-10-CM

## 2024-06-14 DIAGNOSIS — E66.01 MORBID (SEVERE) OBESITY DUE TO EXCESS CALORIES: ICD-10-CM

## 2024-06-14 DIAGNOSIS — E11.9 TYPE 2 DIABETES MELLITUS W/OUT COMPLICATIONS: ICD-10-CM

## 2024-06-14 DIAGNOSIS — Z98.890 OTHER SPECIFIED POSTPROCEDURAL STATES: Chronic | ICD-10-CM

## 2024-06-14 PROCEDURE — 99213 OFFICE O/P EST LOW 20 MIN: CPT | Mod: 95

## 2024-06-14 PROCEDURE — G0463: CPT

## 2024-06-14 NOTE — HISTORY OF PRESENT ILLNESS
[FreeTextEntry1] : 63M PMH obesity, HLD, GERD, MARÍA presents for follow up   had difficulty getting zepbound again but now on 10mg some nausea but tolerable with it feels appetite under better control  was away in [place] and [place]  not sure of today's weight - thinks either 255 or 265  Otherwise without new complaints today.

## 2024-06-14 NOTE — ASSESSMENT
[FreeTextEntry1] : Recommend the following:   whole foods based eating strategy limiting refined carbs and added fats will do this conjointly with wife cont mounjaro 10mg - monitor nausea    rtc in 6 weeks

## 2024-06-14 NOTE — REASON FOR VISIT
[Follow-Up] : a follow-up visit [Home] : at home, [unfilled] , at the time of the visit. [Other Location: e.g. Home (Enter Location, City,State)___] : at [unfilled] [Spouse] : spouse [Patient] : the patient

## 2024-06-24 DIAGNOSIS — E66.01 MORBID (SEVERE) OBESITY DUE TO EXCESS CALORIES: ICD-10-CM

## 2024-06-24 DIAGNOSIS — E11.9 TYPE 2 DIABETES MELLITUS WITHOUT COMPLICATIONS: ICD-10-CM

## 2024-07-02 ENCOUNTER — RX RENEWAL (OUTPATIENT)
Age: 64
End: 2024-07-02

## 2024-08-02 ENCOUNTER — OUTPATIENT (OUTPATIENT)
Dept: OUTPATIENT SERVICES | Facility: HOSPITAL | Age: 64
LOS: 1 days | End: 2024-08-02
Payer: COMMERCIAL

## 2024-08-02 ENCOUNTER — APPOINTMENT (OUTPATIENT)
Dept: BARIATRICS/WEIGHT MGMT | Facility: CLINIC | Age: 64
End: 2024-08-02
Payer: COMMERCIAL

## 2024-08-02 DIAGNOSIS — E66.01 MORBID (SEVERE) OBESITY DUE TO EXCESS CALORIES: ICD-10-CM

## 2024-08-02 DIAGNOSIS — Z98.890 OTHER SPECIFIED POSTPROCEDURAL STATES: Chronic | ICD-10-CM

## 2024-08-02 DIAGNOSIS — I10 ESSENTIAL (PRIMARY) HYPERTENSION: ICD-10-CM

## 2024-08-02 DIAGNOSIS — Z96.651 PRESENCE OF RIGHT ARTIFICIAL KNEE JOINT: Chronic | ICD-10-CM

## 2024-08-02 DIAGNOSIS — E11.9 TYPE 2 DIABETES MELLITUS W/OUT COMPLICATIONS: ICD-10-CM

## 2024-08-02 PROCEDURE — 99213 OFFICE O/P EST LOW 20 MIN: CPT | Mod: 95

## 2024-08-02 PROCEDURE — G2211 COMPLEX E/M VISIT ADD ON: CPT | Mod: NC,95

## 2024-08-02 PROCEDURE — G0463: CPT

## 2024-08-02 RX ORDER — TIRZEPATIDE 15 MG/.5ML
15 INJECTION, SOLUTION SUBCUTANEOUS
Qty: 1 | Refills: 5 | Status: ACTIVE | COMMUNITY
Start: 2024-08-02 | End: 1900-01-01

## 2024-08-02 NOTE — REASON FOR VISIT
[Home] : at home, [unfilled] , at the time of the visit. [Other Location: e.g. Home (Enter Location, City,State)___] : at [unfilled] [Patient] : the patient [Follow-Up] : a follow-up visit [Spouse] : spouse

## 2024-08-02 NOTE — ASSESSMENT
[FreeTextEntry1] : BARIATRIC SURGERY HISTORY: none  OBESITY COMORBIDITIES: DM2, HLD  ANTI-OBESITY MEDICATIONS: trulicity previously, now mounjaro  OBESITY MEDICATION SIDE EFFECTS: none    Recommend the following:   whole foods based eating strategy limiting refined carbs, excess animal protein and added fats  will do this conjointly with wife increase  mounjaro to 15mg  cont daily walking    rtc in 6 weeks

## 2024-08-02 NOTE — HISTORY OF PRESENT ILLNESS
[FreeTextEntry1] : 63M PMH obesity, HLD, GERD, MARÍA presents for follow up   on mounjaro 15mg no nausea but still feels some hunger with meds feels that belly fat has diminished  weight = 260 lbs most recently which is down from what had reached 265 lbs walking daily still sleep disruption from young child in house  Otherwise without new complaints today.

## 2024-08-05 DIAGNOSIS — E66.01 MORBID (SEVERE) OBESITY DUE TO EXCESS CALORIES: ICD-10-CM

## 2024-08-05 DIAGNOSIS — E11.9 TYPE 2 DIABETES MELLITUS WITHOUT COMPLICATIONS: ICD-10-CM

## 2024-09-12 RX ORDER — TIRZEPATIDE 15 MG/.5ML
15 INJECTION, SOLUTION SUBCUTANEOUS
Qty: 3 | Refills: 1 | Status: ACTIVE | COMMUNITY
Start: 2024-09-12 | End: 1900-01-01

## 2024-09-19 ENCOUNTER — APPOINTMENT (OUTPATIENT)
Dept: INTERNAL MEDICINE | Facility: CLINIC | Age: 64
End: 2024-09-19
Payer: COMMERCIAL

## 2024-09-19 VITALS
DIASTOLIC BLOOD PRESSURE: 82 MMHG | RESPIRATION RATE: 16 BRPM | HEIGHT: 69 IN | WEIGHT: 251 LBS | BODY MASS INDEX: 37.18 KG/M2 | HEART RATE: 81 BPM | OXYGEN SATURATION: 98 % | SYSTOLIC BLOOD PRESSURE: 124 MMHG

## 2024-09-19 DIAGNOSIS — E66.01 MORBID (SEVERE) OBESITY DUE TO EXCESS CALORIES: ICD-10-CM

## 2024-09-19 DIAGNOSIS — K62.5 HEMORRHAGE OF ANUS AND RECTUM: ICD-10-CM

## 2024-09-19 DIAGNOSIS — G25.81 RESTLESS LEGS SYNDROME: ICD-10-CM

## 2024-09-19 DIAGNOSIS — M25.512 PAIN IN LEFT SHOULDER: ICD-10-CM

## 2024-09-19 DIAGNOSIS — M19.90 UNSPECIFIED OSTEOARTHRITIS, UNSPECIFIED SITE: ICD-10-CM

## 2024-09-19 DIAGNOSIS — E55.9 VITAMIN D DEFICIENCY, UNSPECIFIED: ICD-10-CM

## 2024-09-19 DIAGNOSIS — M17.11 UNILATERAL PRIMARY OSTEOARTHRITIS, RIGHT KNEE: ICD-10-CM

## 2024-09-19 DIAGNOSIS — Z96.651 PRESENCE OF RIGHT ARTIFICIAL KNEE JOINT: ICD-10-CM

## 2024-09-19 DIAGNOSIS — K59.09 OTHER CONSTIPATION: ICD-10-CM

## 2024-09-19 DIAGNOSIS — G47.33 OBSTRUCTIVE SLEEP APNEA (ADULT) (PEDIATRIC): ICD-10-CM

## 2024-09-19 DIAGNOSIS — K44.9 DIAPHRAGMATIC HERNIA W/OUT OBSTRUCTION OR GANGRENE: ICD-10-CM

## 2024-09-19 DIAGNOSIS — E78.5 HYPERLIPIDEMIA, UNSPECIFIED: ICD-10-CM

## 2024-09-19 DIAGNOSIS — E11.9 TYPE 2 DIABETES MELLITUS W/OUT COMPLICATIONS: ICD-10-CM

## 2024-09-19 DIAGNOSIS — I87.2 VENOUS INSUFFICIENCY (CHRONIC) (PERIPHERAL): ICD-10-CM

## 2024-09-19 DIAGNOSIS — K22.710 BARRETT'S ESOPHAGUS WITH LOW GRADE DYSPLASIA: ICD-10-CM

## 2024-09-19 DIAGNOSIS — J30.2 OTHER SEASONAL ALLERGIC RHINITIS: ICD-10-CM

## 2024-09-19 DIAGNOSIS — M17.12 UNILATERAL PRIMARY OSTEOARTHRITIS, LEFT KNEE: ICD-10-CM

## 2024-09-19 DIAGNOSIS — J06.9 ACUTE UPPER RESPIRATORY INFECTION, UNSPECIFIED: ICD-10-CM

## 2024-09-19 DIAGNOSIS — Z86.718 PERSONAL HISTORY OF OTHER VENOUS THROMBOSIS AND EMBOLISM: ICD-10-CM

## 2024-09-19 DIAGNOSIS — K21.00 GASTRO-ESOPHAGEAL REFLUX DISEASE WITH ESOPHAGITIS, WITHOUT BLEEDING: ICD-10-CM

## 2024-09-19 PROCEDURE — 36415 COLL VENOUS BLD VENIPUNCTURE: CPT

## 2024-09-19 PROCEDURE — G2211 COMPLEX E/M VISIT ADD ON: CPT | Mod: NC

## 2024-09-19 PROCEDURE — 99214 OFFICE O/P EST MOD 30 MIN: CPT

## 2024-09-19 RX ORDER — PROMETHAZINE HYDROCHLORIDE AND DEXTROMETHORPHAN HYDROBROMIDE ORAL SOLUTION 15; 6.25 MG/5ML; MG/5ML
6.25-15 SOLUTION ORAL
Qty: 120 | Refills: 1 | Status: ACTIVE | COMMUNITY
Start: 2024-09-19 | End: 2024-10-19

## 2024-09-19 NOTE — HEALTH RISK ASSESSMENT
[Yes] : Yes [Monthly or less (1 pt)] : Monthly or less (1 point) [1 or 2 (0 pts)] : 1 or 2 (0 points) [Never (0 pts)] : Never (0 points) [No] : In the past 12 months have you used drugs other than those required for medical reasons? No [0] : 2) Feeling down, depressed, or hopeless: Not at all (0) [PHQ-2 Negative - No further assessment needed] : PHQ-2 Negative - No further assessment needed [Former] : Former [> 15 Years] : > 15 Years [Audit-CScore] : 1 [[id number]] : 0

## 2024-09-19 NOTE — PHYSICAL EXAM
[No Acute Distress] : no acute distress [Well Nourished] : well nourished [Well Developed] : well developed [Well-Appearing] : well-appearing [Normal Voice/Communication] : normal voice/communication [Normal Sclera/Conjunctiva] : normal sclera/conjunctiva [PERRL] : pupils equal round and reactive to light [EOMI] : extraocular movements intact [Normal Outer Ear/Nose] : the outer ears and nose were normal in appearance [Normal Oropharynx] : the oropharynx was normal [No JVD] : no jugular venous distention [No Lymphadenopathy] : no lymphadenopathy [Supple] : supple [Thyroid Normal, No Nodules] : the thyroid was normal and there were no nodules present [No Respiratory Distress] : no respiratory distress  [No Accessory Muscle Use] : no accessory muscle use [Clear to Auscultation] : lungs were clear to auscultation bilaterally [Normal Rate] : normal rate  [Regular Rhythm] : with a regular rhythm [Normal S1, S2] : normal S1 and S2 [No Murmur] : no murmur heard [No Carotid Bruits] : no carotid bruits [No Abdominal Bruit] : a ~M bruit was not heard ~T in the abdomen [Pedal Pulses Present] : the pedal pulses are present [No Edema] : there was no peripheral edema [No Palpable Aorta] : no palpable aorta [Soft] : abdomen soft [Non Tender] : non-tender [Non-distended] : non-distended [No Masses] : no abdominal mass palpated [No HSM] : no HSM [Normal Bowel Sounds] : normal bowel sounds [Normal Posterior Cervical Nodes] : no posterior cervical lymphadenopathy [Normal Anterior Cervical Nodes] : no anterior cervical lymphadenopathy [No CVA Tenderness] : no CVA  tenderness [No Spinal Tenderness] : no spinal tenderness [No Joint Swelling] : no joint swelling [Grossly Normal Strength/Tone] : grossly normal strength/tone [Coordination Grossly Intact] : coordination grossly intact [No Focal Deficits] : no focal deficits [Normal Gait] : normal gait [Speech Grossly Normal] : speech grossly normal [Memory Grossly Normal] : memory grossly normal [Normal Affect] : the affect was normal [Alert and Oriented x3] : oriented to person, place, and time [Normal Mood] : the mood was normal [Normal Insight/Judgement] : insight and judgment were intact [de-identified] : obese

## 2024-09-19 NOTE — PHYSICAL EXAM
[No Acute Distress] : no acute distress [Well Nourished] : well nourished [Well Developed] : well developed [Well-Appearing] : well-appearing [Normal Voice/Communication] : normal voice/communication [Normal Sclera/Conjunctiva] : normal sclera/conjunctiva [PERRL] : pupils equal round and reactive to light [EOMI] : extraocular movements intact [Normal Outer Ear/Nose] : the outer ears and nose were normal in appearance [Normal Oropharynx] : the oropharynx was normal [No JVD] : no jugular venous distention [No Lymphadenopathy] : no lymphadenopathy [Supple] : supple [Thyroid Normal, No Nodules] : the thyroid was normal and there were no nodules present [No Respiratory Distress] : no respiratory distress  [No Accessory Muscle Use] : no accessory muscle use [Clear to Auscultation] : lungs were clear to auscultation bilaterally [Normal Rate] : normal rate  [Regular Rhythm] : with a regular rhythm [Normal S1, S2] : normal S1 and S2 [No Murmur] : no murmur heard [No Carotid Bruits] : no carotid bruits [No Abdominal Bruit] : a ~M bruit was not heard ~T in the abdomen [Pedal Pulses Present] : the pedal pulses are present [No Edema] : there was no peripheral edema [No Palpable Aorta] : no palpable aorta [Soft] : abdomen soft [Non Tender] : non-tender [Non-distended] : non-distended [No Masses] : no abdominal mass palpated [No HSM] : no HSM [Normal Bowel Sounds] : normal bowel sounds [Normal Posterior Cervical Nodes] : no posterior cervical lymphadenopathy [Normal Anterior Cervical Nodes] : no anterior cervical lymphadenopathy [No CVA Tenderness] : no CVA  tenderness [No Spinal Tenderness] : no spinal tenderness [No Joint Swelling] : no joint swelling [Grossly Normal Strength/Tone] : grossly normal strength/tone [Coordination Grossly Intact] : coordination grossly intact [No Focal Deficits] : no focal deficits [Normal Gait] : normal gait [Speech Grossly Normal] : speech grossly normal [Memory Grossly Normal] : memory grossly normal [Normal Affect] : the affect was normal [Alert and Oriented x3] : oriented to person, place, and time [Normal Mood] : the mood was normal [Normal Insight/Judgement] : insight and judgment were intact [de-identified] : obese

## 2024-09-19 NOTE — HEALTH RISK ASSESSMENT
[Yes] : Yes [Monthly or less (1 pt)] : Monthly or less (1 point) [1 or 2 (0 pts)] : 1 or 2 (0 points) [Never (0 pts)] : Never (0 points) [No] : In the past 12 months have you used drugs other than those required for medical reasons? No [0] : 2) Feeling down, depressed, or hopeless: Not at all (0) [PHQ-2 Negative - No further assessment needed] : PHQ-2 Negative - No further assessment needed [Former] : Former [> 15 Years] : > 15 Years [Audit-CScore] : 1 [[id number]] : 0
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
## 2024-09-20 LAB
25(OH)D3 SERPL-MCNC: 51.6 NG/ML
ALBUMIN SERPL ELPH-MCNC: 4.4 G/DL
ALP BLD-CCNC: 66 U/L
ALT SERPL-CCNC: 31 U/L
ANION GAP SERPL CALC-SCNC: 13 MMOL/L
AST SERPL-CCNC: 20 U/L
BILIRUB SERPL-MCNC: 0.4 MG/DL
BUN SERPL-MCNC: 15 MG/DL
CALCIUM SERPL-MCNC: 9.9 MG/DL
CHLORIDE SERPL-SCNC: 103 MMOL/L
CHOLEST SERPL-MCNC: 109 MG/DL
CO2 SERPL-SCNC: 24 MMOL/L
CREAT SERPL-MCNC: 0.86 MG/DL
EGFR: 97 ML/MIN/1.73M2
ESTIMATED AVERAGE GLUCOSE: 111 MG/DL
GLUCOSE SERPL-MCNC: 89 MG/DL
HBA1C MFR BLD HPLC: 5.5 %
HDLC SERPL-MCNC: 31 MG/DL
LDLC SERPL CALC-MCNC: 53 MG/DL
NONHDLC SERPL-MCNC: 78 MG/DL
POTASSIUM SERPL-SCNC: 4.5 MMOL/L
PROT SERPL-MCNC: 7.5 G/DL
PSA SERPL-MCNC: 4.7 NG/ML
SODIUM SERPL-SCNC: 139 MMOL/L
TRIGL SERPL-MCNC: 138 MG/DL

## 2024-10-03 ENCOUNTER — RX RENEWAL (OUTPATIENT)
Age: 64
End: 2024-10-03

## 2024-10-25 ENCOUNTER — OUTPATIENT (OUTPATIENT)
Dept: OUTPATIENT SERVICES | Facility: HOSPITAL | Age: 64
LOS: 1 days | End: 2024-10-25
Payer: COMMERCIAL

## 2024-10-25 ENCOUNTER — APPOINTMENT (OUTPATIENT)
Dept: BARIATRICS/WEIGHT MGMT | Facility: CLINIC | Age: 64
End: 2024-10-25
Payer: COMMERCIAL

## 2024-10-25 DIAGNOSIS — Z96.651 PRESENCE OF RIGHT ARTIFICIAL KNEE JOINT: Chronic | ICD-10-CM

## 2024-10-25 DIAGNOSIS — E11.9 TYPE 2 DIABETES MELLITUS W/OUT COMPLICATIONS: ICD-10-CM

## 2024-10-25 DIAGNOSIS — E66.01 MORBID (SEVERE) OBESITY DUE TO EXCESS CALORIES: ICD-10-CM

## 2024-10-25 DIAGNOSIS — I10 ESSENTIAL (PRIMARY) HYPERTENSION: ICD-10-CM

## 2024-10-25 DIAGNOSIS — Z98.890 OTHER SPECIFIED POSTPROCEDURAL STATES: Chronic | ICD-10-CM

## 2024-10-25 PROCEDURE — G2211 COMPLEX E/M VISIT ADD ON: CPT | Mod: NC,95

## 2024-10-25 PROCEDURE — 99213 OFFICE O/P EST LOW 20 MIN: CPT | Mod: 95

## 2024-10-25 PROCEDURE — G0463: CPT

## 2024-11-04 DIAGNOSIS — E66.01 MORBID (SEVERE) OBESITY DUE TO EXCESS CALORIES: ICD-10-CM

## 2024-11-04 DIAGNOSIS — E11.9 TYPE 2 DIABETES MELLITUS WITHOUT COMPLICATIONS: ICD-10-CM

## 2024-11-21 NOTE — PATIENT PROFILE ADULT. - PRO SERVICES AT [facility]
Patient due for annual CT Lung Screening. Reminder letter mailed.    Routed to PCP, DIEGO Pastor RN      
none

## 2024-12-11 NOTE — ASU PATIENT PROFILE, ADULT - NS TRANSFER DENTURES
Increase ambulation and utilize incentive spirometry frequently.   Apply ice packs to abdominal wall/incision sites for 20 mins on/20 mins off every 4 hours for the next 24 hours and to shoulders as needed for discomfort.   Continue Bariatric Phase 1 Diet and follow nutritional guidelines as instructed.  Take liquid/dissolveable Tylenol 1000mg every 6 hours for at least 3 days and no more than 5 days. Take oxycodone as needed for breakthrough pain. If taking narcotic pain medications, may take Colace/OTC stool softener (whole) as directed to prevent constipation.
none

## 2024-12-13 ENCOUNTER — APPOINTMENT (OUTPATIENT)
Dept: BARIATRICS/WEIGHT MGMT | Facility: CLINIC | Age: 64
End: 2024-12-13

## 2024-12-20 ENCOUNTER — APPOINTMENT (OUTPATIENT)
Dept: INTERNAL MEDICINE | Facility: CLINIC | Age: 64
End: 2024-12-20
Payer: MEDICARE

## 2024-12-20 VITALS
TEMPERATURE: 97.9 F | SYSTOLIC BLOOD PRESSURE: 116 MMHG | WEIGHT: 246 LBS | RESPIRATION RATE: 16 BRPM | HEIGHT: 69 IN | DIASTOLIC BLOOD PRESSURE: 80 MMHG | BODY MASS INDEX: 36.43 KG/M2 | OXYGEN SATURATION: 99 % | HEART RATE: 84 BPM

## 2024-12-20 DIAGNOSIS — M19.90 UNSPECIFIED OSTEOARTHRITIS, UNSPECIFIED SITE: ICD-10-CM

## 2024-12-20 DIAGNOSIS — E78.5 HYPERLIPIDEMIA, UNSPECIFIED: ICD-10-CM

## 2024-12-20 DIAGNOSIS — J30.2 OTHER SEASONAL ALLERGIC RHINITIS: ICD-10-CM

## 2024-12-20 DIAGNOSIS — M17.12 UNILATERAL PRIMARY OSTEOARTHRITIS, LEFT KNEE: ICD-10-CM

## 2024-12-20 DIAGNOSIS — G47.33 OBSTRUCTIVE SLEEP APNEA (ADULT) (PEDIATRIC): ICD-10-CM

## 2024-12-20 DIAGNOSIS — K62.5 HEMORRHAGE OF ANUS AND RECTUM: ICD-10-CM

## 2024-12-20 DIAGNOSIS — E55.9 VITAMIN D DEFICIENCY, UNSPECIFIED: ICD-10-CM

## 2024-12-20 DIAGNOSIS — K59.09 OTHER CONSTIPATION: ICD-10-CM

## 2024-12-20 DIAGNOSIS — E66.01 MORBID (SEVERE) OBESITY DUE TO EXCESS CALORIES: ICD-10-CM

## 2024-12-20 DIAGNOSIS — K44.9 DIAPHRAGMATIC HERNIA W/OUT OBSTRUCTION OR GANGRENE: ICD-10-CM

## 2024-12-20 DIAGNOSIS — Z86.718 PERSONAL HISTORY OF OTHER VENOUS THROMBOSIS AND EMBOLISM: ICD-10-CM

## 2024-12-20 DIAGNOSIS — E11.9 TYPE 2 DIABETES MELLITUS W/OUT COMPLICATIONS: ICD-10-CM

## 2024-12-20 DIAGNOSIS — M17.11 UNILATERAL PRIMARY OSTEOARTHRITIS, RIGHT KNEE: ICD-10-CM

## 2024-12-20 DIAGNOSIS — K21.00 GASTRO-ESOPHAGEAL REFLUX DISEASE WITH ESOPHAGITIS, WITHOUT BLEEDING: ICD-10-CM

## 2024-12-20 DIAGNOSIS — G25.81 RESTLESS LEGS SYNDROME: ICD-10-CM

## 2024-12-20 DIAGNOSIS — I87.2 VENOUS INSUFFICIENCY (CHRONIC) (PERIPHERAL): ICD-10-CM

## 2024-12-20 DIAGNOSIS — M25.512 PAIN IN LEFT SHOULDER: ICD-10-CM

## 2024-12-20 DIAGNOSIS — K22.710 BARRETT'S ESOPHAGUS WITH LOW GRADE DYSPLASIA: ICD-10-CM

## 2024-12-20 DIAGNOSIS — Z96.651 PRESENCE OF RIGHT ARTIFICIAL KNEE JOINT: ICD-10-CM

## 2024-12-20 PROBLEM — R97.20 ELEVATED PSA: Status: ACTIVE | Noted: 2024-12-20

## 2024-12-20 PROCEDURE — G2211 COMPLEX E/M VISIT ADD ON: CPT

## 2024-12-20 PROCEDURE — 36415 COLL VENOUS BLD VENIPUNCTURE: CPT

## 2024-12-20 PROCEDURE — 99204 OFFICE O/P NEW MOD 45 MIN: CPT

## 2024-12-21 LAB
25(OH)D3 SERPL-MCNC: 45.9 NG/ML
ALBUMIN SERPL ELPH-MCNC: 4.7 G/DL
ALP BLD-CCNC: 60 U/L
ALT SERPL-CCNC: 54 U/L
ANION GAP SERPL CALC-SCNC: 11 MMOL/L
AST SERPL-CCNC: 27 U/L
BILIRUB SERPL-MCNC: 0.5 MG/DL
BUN SERPL-MCNC: 17 MG/DL
CALCIUM SERPL-MCNC: 10.1 MG/DL
CHLORIDE SERPL-SCNC: 102 MMOL/L
CHOLEST SERPL-MCNC: 117 MG/DL
CO2 SERPL-SCNC: 25 MMOL/L
CREAT SERPL-MCNC: 0.93 MG/DL
EGFR: 91 ML/MIN/1.73M2
ESTIMATED AVERAGE GLUCOSE: 111 MG/DL
GLUCOSE SERPL-MCNC: 91 MG/DL
HBA1C MFR BLD HPLC: 5.5 %
HDLC SERPL-MCNC: 28 MG/DL
LDLC SERPL CALC-MCNC: 62 MG/DL
NONHDLC SERPL-MCNC: 89 MG/DL
POTASSIUM SERPL-SCNC: 4.4 MMOL/L
PROT SERPL-MCNC: 7.5 G/DL
PSA FREE FLD-MCNC: 25 %
PSA FREE SERPL-MCNC: 0.15 NG/ML
PSA SERPL-MCNC: 0.58 NG/ML
SODIUM SERPL-SCNC: 138 MMOL/L
TRIGL SERPL-MCNC: 155 MG/DL

## 2024-12-23 ENCOUNTER — APPOINTMENT (OUTPATIENT)
Dept: UROLOGY | Facility: CLINIC | Age: 64
End: 2024-12-23
Payer: MEDICARE

## 2024-12-23 DIAGNOSIS — R97.20 ELEVATED PROSTATE, SPECIFIC ANTIGEN [PSA]: ICD-10-CM

## 2024-12-23 PROCEDURE — 99204 OFFICE O/P NEW MOD 45 MIN: CPT

## 2024-12-24 NOTE — DISCHARGE NOTE ADULT - SECONDARY DIAGNOSIS.
Detail Level: Zone Plan: Use moisturizer on areas of the body with dry skin Plan: Pt can have SK removed if it becomes irritated or painful in the future Detail Level: Detailed GERD (gastroesophageal reflux disease) Obesity MARÍA on CPAP 3

## 2024-12-25 PROBLEM — F10.90 ALCOHOL USE: Status: ACTIVE | Noted: 2018-02-27

## 2025-01-16 ENCOUNTER — OUTPATIENT (OUTPATIENT)
Dept: OUTPATIENT SERVICES | Facility: HOSPITAL | Age: 65
LOS: 1 days | End: 2025-01-16

## 2025-01-16 ENCOUNTER — APPOINTMENT (OUTPATIENT)
Dept: BARIATRICS/WEIGHT MGMT | Facility: CLINIC | Age: 65
End: 2025-01-16
Payer: MEDICARE

## 2025-01-16 DIAGNOSIS — I10 ESSENTIAL (PRIMARY) HYPERTENSION: ICD-10-CM

## 2025-01-16 DIAGNOSIS — E66.01 MORBID (SEVERE) OBESITY DUE TO EXCESS CALORIES: ICD-10-CM

## 2025-01-16 DIAGNOSIS — Z98.890 OTHER SPECIFIED POSTPROCEDURAL STATES: Chronic | ICD-10-CM

## 2025-01-16 DIAGNOSIS — E11.9 TYPE 2 DIABETES MELLITUS W/OUT COMPLICATIONS: ICD-10-CM

## 2025-01-16 DIAGNOSIS — Z96.651 PRESENCE OF RIGHT ARTIFICIAL KNEE JOINT: Chronic | ICD-10-CM

## 2025-01-16 PROCEDURE — 99203 OFFICE O/P NEW LOW 30 MIN: CPT | Mod: 95

## 2025-01-16 PROCEDURE — 99213 OFFICE O/P EST LOW 20 MIN: CPT | Mod: 95

## 2025-01-16 RX ORDER — TIRZEPATIDE 15 MG/.5ML
15 INJECTION, SOLUTION SUBCUTANEOUS
Qty: 12 | Refills: 1 | Status: ACTIVE | COMMUNITY
Start: 2025-01-16 | End: 1900-01-01

## 2025-02-05 ENCOUNTER — RX RENEWAL (OUTPATIENT)
Age: 65
End: 2025-02-05

## 2025-02-19 ENCOUNTER — OUTPATIENT (OUTPATIENT)
Dept: OUTPATIENT SERVICES | Facility: HOSPITAL | Age: 65
LOS: 1 days | End: 2025-02-19
Payer: MEDICARE

## 2025-02-19 ENCOUNTER — RESULT REVIEW (OUTPATIENT)
Age: 65
End: 2025-02-19

## 2025-02-19 ENCOUNTER — APPOINTMENT (OUTPATIENT)
Dept: GASTROENTEROLOGY | Facility: HOSPITAL | Age: 65
End: 2025-02-19

## 2025-02-19 ENCOUNTER — TRANSCRIPTION ENCOUNTER (OUTPATIENT)
Age: 65
End: 2025-02-19

## 2025-02-19 VITALS
SYSTOLIC BLOOD PRESSURE: 101 MMHG | OXYGEN SATURATION: 97 % | DIASTOLIC BLOOD PRESSURE: 71 MMHG | HEART RATE: 71 BPM | RESPIRATION RATE: 19 BRPM

## 2025-02-19 VITALS
TEMPERATURE: 98 F | WEIGHT: 248.02 LBS | SYSTOLIC BLOOD PRESSURE: 130 MMHG | OXYGEN SATURATION: 97 % | HEIGHT: 69 IN | HEART RATE: 73 BPM | DIASTOLIC BLOOD PRESSURE: 80 MMHG | RESPIRATION RATE: 18 BRPM

## 2025-02-19 DIAGNOSIS — K22.710 BARRETT'S ESOPHAGUS WITH LOW GRADE DYSPLASIA: ICD-10-CM

## 2025-02-19 DIAGNOSIS — Z98.890 OTHER SPECIFIED POSTPROCEDURAL STATES: Chronic | ICD-10-CM

## 2025-02-19 DIAGNOSIS — Z96.651 PRESENCE OF RIGHT ARTIFICIAL KNEE JOINT: Chronic | ICD-10-CM

## 2025-02-19 LAB — GLUCOSE BLDC GLUCOMTR-MCNC: 95 MG/DL — SIGNIFICANT CHANGE UP (ref 70–99)

## 2025-02-19 PROCEDURE — 82962 GLUCOSE BLOOD TEST: CPT

## 2025-02-19 PROCEDURE — 88305 TISSUE EXAM BY PATHOLOGIST: CPT

## 2025-02-19 PROCEDURE — 43239 EGD BIOPSY SINGLE/MULTIPLE: CPT

## 2025-02-19 PROCEDURE — 88305 TISSUE EXAM BY PATHOLOGIST: CPT | Mod: 26

## 2025-02-19 RX ORDER — TIRZEPATIDE 7.5 MG/.5ML
15 INJECTION, SOLUTION SUBCUTANEOUS
Refills: 0 | DISCHARGE

## 2025-02-19 RX ADMIN — Medication 30 MILLILITER(S): at 15:40
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
## 2025-02-19 NOTE — ASU DISCHARGE PLAN (ADULT/PEDIATRIC) - OK TO LEAVE MESSAGE ON VOICEMAIL
[de-identified] : Ousmane is a 16-year-old male who is here for follow-up after initially presenting to an OSH in May with acute abdominal pain and emesis. CT done while in the ED and found to have wall thickening of the terminal ileum with possible stenosis and small bowel obstruction. At the time CBC with leukopenia to 3, thrombocytopenia to 97. Hb normal. CMP with albumin 3.9. Mild elevation of CRP to 7. Lipase normal. RVP +R/E. No calprotectin sent.   To better assess etiology of obstruction and questionable inflammation, MRE obtained. MRE showed wall thickening and luminal narrowing involving a 6cm segment of TI with associated restricted diffusion and mild hyperenhancement. The proximal small bowel was also mildly dilated measuring up to 3 cm. Overall there was decreased small bowel dilatation compared with the prior CT scan. There was also mild wall thickening and hyperenhancement in the proximal ascending colon.   He then had EGD/colonoscopy the following week that was grossly normal and histopathology was unremarkable.   Since May he has been feeling well without any symptoms. Denies abdominal pain, emesis, diarrhea, constipation, or bloody stools. Denies fevers, rashes or joint pain. Had one oral ulcer earlier this year that resolved. Eating well. Stools daily [place] 4. Able to do all activities without issue. All symptoms from May have completely resolved.  Yes

## 2025-02-19 NOTE — ASU DISCHARGE PLAN (ADULT/PEDIATRIC) - FINANCIAL ASSISTANCE
[facility] provides services at a reduced cost to those who are determined to be eligible through [facility]’s financial assistance program. Information regarding [facility]’s financial assistance program can be found by going to [email or web address] or by calling [phone].

## 2025-02-19 NOTE — ASU DISCHARGE PLAN (ADULT/PEDIATRIC) - NSDISCH_ACTIVITY_ENDO_ALL_CORE_FT
As you may have been given sedative drugs and medications, you should not drive or operate heavy machinery the next 24 hours. You should avoid any heavy lifting, straining or running today. To the extent possible, defer any important decisions for the next 24 hours.
Awake

## 2025-02-19 NOTE — PRE-ANESTHESIA EVALUATION ADULT - WEIGHT IN KG
Anesthesia Pre Eval Note    Anesthesia ROS/Med Hx    Overall Review:  EKG was reviewed     Anesthetic Complication History:    Patient does not have a history of anesthetic complications      Pulmonary Review:  Patient does not have a pulmonary history      Neuro/Psych Review:       Positive for neuromuscular disease (left lower leg pain 2/2 HNP)    Cardiovascular Review:     Positive for hyperlipidemia    GI/HEPATIC/RENAL Review:  Patient does not have a GI/hepatic/renalhistory       End/Other Review:  Patient does not have an endo/other history    Additional Results:  EKG:  No results found for this or any previous visit (from the past 4464 hour(s)).    ALLERGIES:  No Known Allergies   Last Labs        Component                Value               Date/Time                  INR                      1.0                 01/08/2024 0633        Past Medical History:  No date: High cholesterol  No date: HNP (herniated nucleus pulposus), lumbar  Past Surgical History:  No date: Open access colonoscopy   Prior to Admission medications :  Medication ROSUVASTATIN CALCIUM PO, Sig Take 20 mg by mouth daily., Start Date , End Date , Taking? , Authorizing Provider Provider, Outside    Medication Collagenase Powder, Sig , Start Date , End Date , Taking? , Authorizing Provider Provider, Outside         Patient Vitals for the past 24 hrs:   BP Temp Temp src Pulse Resp SpO2 Height Weight   01/08/24 0610 (!) 152/97 35.6 °C (96.1 °F) Tympanic 74 (!) 22 99 % 5' 11\" (1.803 m) 98.7 kg (217 lb 9.5 oz)       Social history reviewed:  Social History     Tobacco Use   Smoking Status Never   Smokeless Tobacco Former    Types: Chew        E-Cigarette/Vaping Substances & Devices    E-Cigarette/Vaping Use Never Used        Social History     Substance and Sexual Activity   Alcohol Use Yes    Alcohol/week: 1.0 standard drink of alcohol    Types: 1 Glasses of wine per week           Relevant Problems   No relevant active problems       Physical  Exam     Airway   Mallampati: II  TM Distance: >3 FB  Neck ROM: Full  Neck: Non-tender and Able to place in sniff position  TMJ Mobility: Good    Cardiovascular  Cardiovascular exam normal  Cardio Rhythm: Regular  Cardio Rate: Normal    Head Assessment  Head assessment: Normocephalic and Atraumatic    General Assessment  General Assessment: Alert and oriented and No acute distress    Dental Exam  Dental exam normal    Legend: C=Chipped  M=Missing  L=Loose B=Bridge Cr=[place] I=Implant    Pulmonary Exam  Pulmonary exam normal  Breath sounds clear to auscultation:  Yes    Abdominal Exam  Abdominal exam normal      Anesthesia Plan:    ASA Status: 2  Anesthesia Type: General    Induction: Intravenous  Preferred Airway Type: ETT  Maintenance: Inhalational    Post-op Pain Management: Per Surgeon      Checklist  Reviewed: NPO Status, Allergies, Medications, Problem list and Past Med History  Consent/Risks Discussed Statement:  The proposed anesthetic plan, including its risks and benefits, have been discussed with the Patient along with the risks and benefits of alternatives. Questions were encouraged and answered and the patient and/or representative understands and agrees to proceed.        I discussed with the patient (and/or patient's legal representative) the risks and benefits of the proposed anesthesia plan, General, which may include services performed by other anesthesia providers.    Alternative anesthesia plans, if available, were reviewed with the patient (and/or patient's legal representative). Discussion has been held with the patient (and/or patient's legal representative) regarding risks of anesthesia, which include Nausea, Vomiting, Dental Injury and sore throat and emergent situations that may require change in anesthesia plan.    The patient (and/or patient's legal representative) has indicated understanding, his/her questions have been answered, and he/she wishes to proceed with the planned  anesthetic.    Blood Products: Not Anticipated     112.5

## 2025-02-19 NOTE — ASU DISCHARGE PLAN (ADULT/PEDIATRIC) - NS MD DC FALL RISK RISK
For information on Fall & Injury Prevention, visit: [email or web address] OR  [email or web address] OR  https://www.cdc.gov/steadi/patient.html

## 2025-02-19 NOTE — ASU PATIENT PROFILE, ADULT - FALL HARM RISK - HARM RISK INTERVENTIONS

## 2025-02-20 ENCOUNTER — APPOINTMENT (OUTPATIENT)
Dept: BARIATRICS/WEIGHT MGMT | Facility: CLINIC | Age: 65
End: 2025-02-20
Payer: MEDICARE

## 2025-02-20 ENCOUNTER — OUTPATIENT (OUTPATIENT)
Dept: OUTPATIENT SERVICES | Facility: HOSPITAL | Age: 65
LOS: 1 days | End: 2025-02-20

## 2025-02-20 DIAGNOSIS — Z96.651 PRESENCE OF RIGHT ARTIFICIAL KNEE JOINT: Chronic | ICD-10-CM

## 2025-02-20 DIAGNOSIS — I10 ESSENTIAL (PRIMARY) HYPERTENSION: ICD-10-CM

## 2025-02-20 DIAGNOSIS — E11.9 TYPE 2 DIABETES MELLITUS W/OUT COMPLICATIONS: ICD-10-CM

## 2025-02-20 DIAGNOSIS — G47.00 INSOMNIA, UNSPECIFIED: ICD-10-CM

## 2025-02-20 DIAGNOSIS — Z98.890 OTHER SPECIFIED POSTPROCEDURAL STATES: Chronic | ICD-10-CM

## 2025-02-20 DIAGNOSIS — E66.01 MORBID (SEVERE) OBESITY DUE TO EXCESS CALORIES: ICD-10-CM

## 2025-02-20 PROCEDURE — 99213 OFFICE O/P EST LOW 20 MIN: CPT | Mod: 95

## 2025-02-20 RX ORDER — TRAZODONE HYDROCHLORIDE 100 MG/1
100 TABLET ORAL
Qty: 30 | Refills: 2 | Status: ACTIVE | COMMUNITY
Start: 2025-02-20 | End: 1900-01-01

## 2025-02-20 RX ORDER — TIRZEPATIDE 15 MG/.5ML
15 INJECTION, SOLUTION SUBCUTANEOUS
Qty: 6 | Refills: 2 | Status: ACTIVE | COMMUNITY
Start: 2025-02-20 | End: 1900-01-01

## 2025-02-21 ENCOUNTER — TRANSCRIPTION ENCOUNTER (OUTPATIENT)
Age: 65
End: 2025-02-21

## 2025-02-21 LAB — SURGICAL PATHOLOGY STUDY: SIGNIFICANT CHANGE UP

## 2025-03-17 ENCOUNTER — RX RENEWAL (OUTPATIENT)
Age: 65
End: 2025-03-17

## 2025-03-20 ENCOUNTER — APPOINTMENT (OUTPATIENT)
Dept: BARIATRICS/WEIGHT MGMT | Facility: CLINIC | Age: 65
End: 2025-03-20

## 2025-03-20 ENCOUNTER — OUTPATIENT (OUTPATIENT)
Dept: OUTPATIENT SERVICES | Facility: HOSPITAL | Age: 65
LOS: 1 days | End: 2025-03-20

## 2025-03-20 DIAGNOSIS — E11.9 TYPE 2 DIABETES MELLITUS W/OUT COMPLICATIONS: ICD-10-CM

## 2025-03-20 DIAGNOSIS — Z96.651 PRESENCE OF RIGHT ARTIFICIAL KNEE JOINT: Chronic | ICD-10-CM

## 2025-03-20 DIAGNOSIS — G47.00 INSOMNIA, UNSPECIFIED: ICD-10-CM

## 2025-03-20 DIAGNOSIS — I10 ESSENTIAL (PRIMARY) HYPERTENSION: ICD-10-CM

## 2025-03-20 DIAGNOSIS — E66.9 OBESITY, UNSPECIFIED: ICD-10-CM

## 2025-03-20 DIAGNOSIS — Z98.890 OTHER SPECIFIED POSTPROCEDURAL STATES: Chronic | ICD-10-CM

## 2025-03-20 PROCEDURE — 99213 OFFICE O/P EST LOW 20 MIN: CPT | Mod: 95

## 2025-03-20 RX ORDER — TOPIRAMATE 25 MG/1
25 TABLET, FILM COATED ORAL
Qty: 60 | Refills: 5 | Status: ACTIVE | COMMUNITY
Start: 2025-03-20 | End: 1900-01-01

## 2025-03-26 ENCOUNTER — APPOINTMENT (OUTPATIENT)
Dept: INTERNAL MEDICINE | Facility: CLINIC | Age: 65
End: 2025-03-26
Payer: MEDICARE

## 2025-03-26 VITALS
SYSTOLIC BLOOD PRESSURE: 108 MMHG | HEIGHT: 69 IN | RESPIRATION RATE: 16 BRPM | OXYGEN SATURATION: 98 % | HEART RATE: 82 BPM | BODY MASS INDEX: 37.18 KG/M2 | DIASTOLIC BLOOD PRESSURE: 70 MMHG | WEIGHT: 251 LBS

## 2025-03-26 DIAGNOSIS — R14.0 ABDOMINAL DISTENSION (GASEOUS): ICD-10-CM

## 2025-03-26 DIAGNOSIS — E55.9 VITAMIN D DEFICIENCY, UNSPECIFIED: ICD-10-CM

## 2025-03-26 DIAGNOSIS — R97.20 ELEVATED PROSTATE, SPECIFIC ANTIGEN [PSA]: ICD-10-CM

## 2025-03-26 DIAGNOSIS — K21.00 GASTRO-ESOPHAGEAL REFLUX DISEASE WITH ESOPHAGITIS, WITHOUT BLEEDING: ICD-10-CM

## 2025-03-26 DIAGNOSIS — M19.90 UNSPECIFIED OSTEOARTHRITIS, UNSPECIFIED SITE: ICD-10-CM

## 2025-03-26 DIAGNOSIS — K44.9 DIAPHRAGMATIC HERNIA W/OUT OBSTRUCTION OR GANGRENE: ICD-10-CM

## 2025-03-26 DIAGNOSIS — E66.01 MORBID (SEVERE) OBESITY DUE TO EXCESS CALORIES: ICD-10-CM

## 2025-03-26 DIAGNOSIS — Z86.718 PERSONAL HISTORY OF OTHER VENOUS THROMBOSIS AND EMBOLISM: ICD-10-CM

## 2025-03-26 DIAGNOSIS — Z96.651 PRESENCE OF RIGHT ARTIFICIAL KNEE JOINT: ICD-10-CM

## 2025-03-26 DIAGNOSIS — I87.2 VENOUS INSUFFICIENCY (CHRONIC) (PERIPHERAL): ICD-10-CM

## 2025-03-26 DIAGNOSIS — J30.2 OTHER SEASONAL ALLERGIC RHINITIS: ICD-10-CM

## 2025-03-26 DIAGNOSIS — M25.512 PAIN IN LEFT SHOULDER: ICD-10-CM

## 2025-03-26 DIAGNOSIS — G47.33 OBSTRUCTIVE SLEEP APNEA (ADULT) (PEDIATRIC): ICD-10-CM

## 2025-03-26 DIAGNOSIS — K22.710 BARRETT'S ESOPHAGUS WITH LOW GRADE DYSPLASIA: ICD-10-CM

## 2025-03-26 DIAGNOSIS — K59.09 OTHER CONSTIPATION: ICD-10-CM

## 2025-03-26 DIAGNOSIS — M17.12 UNILATERAL PRIMARY OSTEOARTHRITIS, LEFT KNEE: ICD-10-CM

## 2025-03-26 DIAGNOSIS — G25.81 RESTLESS LEGS SYNDROME: ICD-10-CM

## 2025-03-26 DIAGNOSIS — M17.11 UNILATERAL PRIMARY OSTEOARTHRITIS, RIGHT KNEE: ICD-10-CM

## 2025-03-26 DIAGNOSIS — K62.5 HEMORRHAGE OF ANUS AND RECTUM: ICD-10-CM

## 2025-03-26 DIAGNOSIS — E78.5 HYPERLIPIDEMIA, UNSPECIFIED: ICD-10-CM

## 2025-03-26 PROCEDURE — 36415 COLL VENOUS BLD VENIPUNCTURE: CPT

## 2025-03-26 PROCEDURE — 99214 OFFICE O/P EST MOD 30 MIN: CPT

## 2025-03-26 PROCEDURE — G2211 COMPLEX E/M VISIT ADD ON: CPT

## 2025-03-27 LAB
ALBUMIN SERPL ELPH-MCNC: 4.7 G/DL
ALP BLD-CCNC: 59 U/L
ALT SERPL-CCNC: 35 U/L
ANION GAP SERPL CALC-SCNC: 12 MMOL/L
APPEARANCE: CLEAR
AST SERPL-CCNC: 25 U/L
BACTERIA: NEGATIVE /HPF
BILIRUB SERPL-MCNC: 0.5 MG/DL
BILIRUBIN URINE: NEGATIVE
BLOOD URINE: NEGATIVE
BUN SERPL-MCNC: 15 MG/DL
CALCIUM SERPL-MCNC: 9.9 MG/DL
CAST: 0 /LPF
CHLORIDE SERPL-SCNC: 107 MMOL/L
CHOLEST SERPL-MCNC: 114 MG/DL
CO2 SERPL-SCNC: 23 MMOL/L
COLOR: YELLOW
CREAT SERPL-MCNC: 0.93 MG/DL
CREAT SPEC-SCNC: 154 MG/DL
EGFRCR SERPLBLD CKD-EPI 2021: 91 ML/MIN/1.73M2
EPITHELIAL CELLS: 0 /HPF
ESTIMATED AVERAGE GLUCOSE: 111 MG/DL
FOLATE SERPL-MCNC: 19.8 NG/ML
GLUCOSE QUALITATIVE U: NEGATIVE MG/DL
GLUCOSE SERPL-MCNC: 100 MG/DL
HBA1C MFR BLD HPLC: 5.5 %
HDLC SERPL-MCNC: 37 MG/DL
KETONES URINE: NEGATIVE MG/DL
LDLC SERPL-MCNC: 55 MG/DL
LEUKOCYTE ESTERASE URINE: NEGATIVE
MICROALBUMIN 24H UR DL<=1MG/L-MCNC: <1.2 MG/DL
MICROALBUMIN/CREAT 24H UR-RTO: NORMAL MG/G
MICROSCOPIC-UA: NORMAL
NITRITE URINE: NEGATIVE
NONHDLC SERPL-MCNC: 77 MG/DL
PH URINE: 5.5
POTASSIUM SERPL-SCNC: 4.6 MMOL/L
PROT SERPL-MCNC: 7.5 G/DL
PROTEIN URINE: NEGATIVE MG/DL
RED BLOOD CELLS URINE: 1 /HPF
SODIUM SERPL-SCNC: 142 MMOL/L
SPECIFIC GRAVITY URINE: 1.02
TRIGL SERPL-MCNC: 120 MG/DL
UROBILINOGEN URINE: 0.2 MG/DL
VIT B12 SERPL-MCNC: 562 PG/ML
WHITE BLOOD CELLS URINE: 0 /HPF

## 2025-03-28 PROBLEM — E66.9 ADULT-ONSET OBESITY: Status: ACTIVE | Noted: 2025-03-28

## 2025-05-06 NOTE — ASU PREOP CHECKLIST - TAMPON REMOVED
LVM to confirm pt upcoming appt with  on 05/13/2025. Office number was left on  for any further questions or concerns.    
n/a

## 2025-05-08 ENCOUNTER — APPOINTMENT (OUTPATIENT)
Dept: BARIATRICS/WEIGHT MGMT | Facility: CLINIC | Age: 65
End: 2025-05-08

## 2025-05-08 ENCOUNTER — OUTPATIENT (OUTPATIENT)
Dept: OUTPATIENT SERVICES | Facility: HOSPITAL | Age: 65
LOS: 1 days | End: 2025-05-08

## 2025-05-08 DIAGNOSIS — Z98.890 OTHER SPECIFIED POSTPROCEDURAL STATES: Chronic | ICD-10-CM

## 2025-05-08 DIAGNOSIS — I10 ESSENTIAL (PRIMARY) HYPERTENSION: ICD-10-CM

## 2025-05-08 DIAGNOSIS — Z96.651 PRESENCE OF RIGHT ARTIFICIAL KNEE JOINT: Chronic | ICD-10-CM

## 2025-05-08 PROCEDURE — 99213 OFFICE O/P EST LOW 20 MIN: CPT | Mod: 95

## 2025-05-08 PROCEDURE — G2211 COMPLEX E/M VISIT ADD ON: CPT | Mod: 95

## 2025-05-12 ENCOUNTER — RX RENEWAL (OUTPATIENT)
Age: 65
End: 2025-05-12

## 2025-06-12 ENCOUNTER — APPOINTMENT (OUTPATIENT)
Dept: BARIATRICS/WEIGHT MGMT | Facility: CLINIC | Age: 65
End: 2025-06-12
Payer: MEDICARE

## 2025-06-12 ENCOUNTER — OUTPATIENT (OUTPATIENT)
Dept: OUTPATIENT SERVICES | Facility: HOSPITAL | Age: 65
LOS: 1 days | End: 2025-06-12

## 2025-06-12 DIAGNOSIS — I10 ESSENTIAL (PRIMARY) HYPERTENSION: ICD-10-CM

## 2025-06-12 DIAGNOSIS — Z96.651 PRESENCE OF RIGHT ARTIFICIAL KNEE JOINT: Chronic | ICD-10-CM

## 2025-06-12 DIAGNOSIS — Z98.890 OTHER SPECIFIED POSTPROCEDURAL STATES: Chronic | ICD-10-CM

## 2025-06-12 PROCEDURE — G2211 COMPLEX E/M VISIT ADD ON: CPT | Mod: 95

## 2025-06-12 PROCEDURE — 99213 OFFICE O/P EST LOW 20 MIN: CPT | Mod: 95

## 2025-07-10 ENCOUNTER — APPOINTMENT (OUTPATIENT)
Dept: BARIATRICS/WEIGHT MGMT | Facility: CLINIC | Age: 65
End: 2025-07-10
Payer: MEDICARE

## 2025-07-10 ENCOUNTER — OUTPATIENT (OUTPATIENT)
Dept: OUTPATIENT SERVICES | Facility: HOSPITAL | Age: 65
LOS: 1 days | End: 2025-07-10

## 2025-07-10 DIAGNOSIS — I10 ESSENTIAL (PRIMARY) HYPERTENSION: ICD-10-CM

## 2025-07-10 DIAGNOSIS — Z98.890 OTHER SPECIFIED POSTPROCEDURAL STATES: Chronic | ICD-10-CM

## 2025-07-10 DIAGNOSIS — Z96.651 PRESENCE OF RIGHT ARTIFICIAL KNEE JOINT: Chronic | ICD-10-CM

## 2025-07-10 PROCEDURE — 99213 OFFICE O/P EST LOW 20 MIN: CPT | Mod: 95

## 2025-07-10 PROCEDURE — G2211 COMPLEX E/M VISIT ADD ON: CPT | Mod: 95

## 2025-07-28 ENCOUNTER — APPOINTMENT (OUTPATIENT)
Dept: INTERNAL MEDICINE | Facility: CLINIC | Age: 65
End: 2025-07-28
Payer: MEDICARE

## 2025-07-28 VITALS
RESPIRATION RATE: 16 BRPM | HEIGHT: 69 IN | SYSTOLIC BLOOD PRESSURE: 114 MMHG | BODY MASS INDEX: 34.96 KG/M2 | OXYGEN SATURATION: 98 % | DIASTOLIC BLOOD PRESSURE: 60 MMHG | HEART RATE: 90 BPM | WEIGHT: 236 LBS

## 2025-07-28 DIAGNOSIS — K22.710 BARRETT'S ESOPHAGUS WITH LOW GRADE DYSPLASIA: ICD-10-CM

## 2025-07-28 DIAGNOSIS — K59.09 OTHER CONSTIPATION: ICD-10-CM

## 2025-07-28 DIAGNOSIS — M17.12 UNILATERAL PRIMARY OSTEOARTHRITIS, LEFT KNEE: ICD-10-CM

## 2025-07-28 DIAGNOSIS — E55.9 VITAMIN D DEFICIENCY, UNSPECIFIED: ICD-10-CM

## 2025-07-28 DIAGNOSIS — K62.5 HEMORRHAGE OF ANUS AND RECTUM: ICD-10-CM

## 2025-07-28 DIAGNOSIS — K44.9 DIAPHRAGMATIC HERNIA W/OUT OBSTRUCTION OR GANGRENE: ICD-10-CM

## 2025-07-28 DIAGNOSIS — J30.2 OTHER SEASONAL ALLERGIC RHINITIS: ICD-10-CM

## 2025-07-28 DIAGNOSIS — G47.00 INSOMNIA, UNSPECIFIED: ICD-10-CM

## 2025-07-28 DIAGNOSIS — Z96.651 PRESENCE OF RIGHT ARTIFICIAL KNEE JOINT: ICD-10-CM

## 2025-07-28 DIAGNOSIS — K21.00 GASTRO-ESOPHAGEAL REFLUX DISEASE WITH ESOPHAGITIS, WITHOUT BLEEDING: ICD-10-CM

## 2025-07-28 DIAGNOSIS — R14.0 ABDOMINAL DISTENSION (GASEOUS): ICD-10-CM

## 2025-07-28 DIAGNOSIS — R42 DIZZINESS AND GIDDINESS: ICD-10-CM

## 2025-07-28 DIAGNOSIS — Z86.718 PERSONAL HISTORY OF OTHER VENOUS THROMBOSIS AND EMBOLISM: ICD-10-CM

## 2025-07-28 DIAGNOSIS — M17.11 UNILATERAL PRIMARY OSTEOARTHRITIS, RIGHT KNEE: ICD-10-CM

## 2025-07-28 DIAGNOSIS — R97.20 ELEVATED PROSTATE, SPECIFIC ANTIGEN [PSA]: ICD-10-CM

## 2025-07-28 DIAGNOSIS — M25.512 PAIN IN LEFT SHOULDER: ICD-10-CM

## 2025-07-28 DIAGNOSIS — G25.81 RESTLESS LEGS SYNDROME: ICD-10-CM

## 2025-07-28 DIAGNOSIS — I87.2 VENOUS INSUFFICIENCY (CHRONIC) (PERIPHERAL): ICD-10-CM

## 2025-07-28 DIAGNOSIS — M19.90 UNSPECIFIED OSTEOARTHRITIS, UNSPECIFIED SITE: ICD-10-CM

## 2025-07-28 PROCEDURE — 36415 COLL VENOUS BLD VENIPUNCTURE: CPT

## 2025-07-28 PROCEDURE — G2211 COMPLEX E/M VISIT ADD ON: CPT

## 2025-07-28 PROCEDURE — 99214 OFFICE O/P EST MOD 30 MIN: CPT

## 2025-07-29 LAB
25(OH)D3 SERPL-MCNC: 53.6 NG/ML
ALBUMIN SERPL ELPH-MCNC: 4.6 G/DL
ALP BLD-CCNC: 58 U/L
ALT SERPL-CCNC: 61 U/L
ANION GAP SERPL CALC-SCNC: 12 MMOL/L
AST SERPL-CCNC: 31 U/L
BASOPHILS # BLD AUTO: 0.1 K/UL
BASOPHILS NFR BLD AUTO: 1.5 %
BILIRUB SERPL-MCNC: 0.7 MG/DL
BUN SERPL-MCNC: 18 MG/DL
CALCIUM SERPL-MCNC: 9.6 MG/DL
CHLORIDE SERPL-SCNC: 106 MMOL/L
CHOLEST SERPL-MCNC: 123 MG/DL
CO2 SERPL-SCNC: 21 MMOL/L
CREAT SERPL-MCNC: 0.96 MG/DL
EGFRCR SERPLBLD CKD-EPI 2021: 88 ML/MIN/1.73M2
EOSINOPHIL # BLD AUTO: 0.25 K/UL
EOSINOPHIL NFR BLD AUTO: 3.7 %
ESTIMATED AVERAGE GLUCOSE: 105 MG/DL
GLUCOSE SERPL-MCNC: 96 MG/DL
HBA1C MFR BLD HPLC: 5.3 %
HCT VFR BLD CALC: 48 %
HDLC SERPL-MCNC: 33 MG/DL
HGB BLD-MCNC: 15.6 G/DL
IMM GRANULOCYTES NFR BLD AUTO: 0.3 %
LDLC SERPL-MCNC: 59 MG/DL
LYMPHOCYTES # BLD AUTO: 1.77 K/UL
LYMPHOCYTES NFR BLD AUTO: 26.3 %
MAN DIFF?: NORMAL
MCHC RBC-ENTMCNC: 31.8 PG
MCHC RBC-ENTMCNC: 32.5 G/DL
MCV RBC AUTO: 98 FL
MONOCYTES # BLD AUTO: 0.61 K/UL
MONOCYTES NFR BLD AUTO: 9.1 %
NEUTROPHILS # BLD AUTO: 3.98 K/UL
NEUTROPHILS NFR BLD AUTO: 59.1 %
NONHDLC SERPL-MCNC: 89 MG/DL
PLATELET # BLD AUTO: 205 K/UL
POTASSIUM SERPL-SCNC: 4.3 MMOL/L
PROT SERPL-MCNC: 7.5 G/DL
RBC # BLD: 4.9 M/UL
RBC # FLD: 13.6 %
SODIUM SERPL-SCNC: 139 MMOL/L
TRIGL SERPL-MCNC: 183 MG/DL
WBC # FLD AUTO: 6.73 K/UL

## 2025-07-30 ENCOUNTER — APPOINTMENT (OUTPATIENT)
Dept: CARDIOLOGY | Facility: CLINIC | Age: 65
End: 2025-07-30
Payer: MEDICARE

## 2025-07-30 ENCOUNTER — NON-APPOINTMENT (OUTPATIENT)
Age: 65
End: 2025-07-30

## 2025-07-30 VITALS
BODY MASS INDEX: 34.85 KG/M2 | DIASTOLIC BLOOD PRESSURE: 70 MMHG | HEART RATE: 70 BPM | WEIGHT: 236 LBS | SYSTOLIC BLOOD PRESSURE: 100 MMHG | OXYGEN SATURATION: 95 %

## 2025-07-30 DIAGNOSIS — Z00.00 ENCOUNTER FOR GENERAL ADULT MEDICAL EXAMINATION W/OUT ABNORMAL FINDINGS: ICD-10-CM

## 2025-07-30 DIAGNOSIS — E66.01 MORBID (SEVERE) OBESITY DUE TO EXCESS CALORIES: ICD-10-CM

## 2025-07-30 DIAGNOSIS — G47.33 OBSTRUCTIVE SLEEP APNEA (ADULT) (PEDIATRIC): ICD-10-CM

## 2025-07-30 DIAGNOSIS — I87.2 VENOUS INSUFFICIENCY (CHRONIC) (PERIPHERAL): ICD-10-CM

## 2025-07-30 DIAGNOSIS — E78.5 HYPERLIPIDEMIA, UNSPECIFIED: ICD-10-CM

## 2025-07-30 DIAGNOSIS — R07.89 OTHER CHEST PAIN: ICD-10-CM

## 2025-07-30 PROCEDURE — 93000 ELECTROCARDIOGRAM COMPLETE: CPT

## 2025-07-30 PROCEDURE — G0537: CPT

## 2025-07-30 PROCEDURE — 99205 OFFICE O/P NEW HI 60 MIN: CPT

## 2025-07-30 PROCEDURE — G2211 COMPLEX E/M VISIT ADD ON: CPT

## 2025-07-31 ENCOUNTER — APPOINTMENT (OUTPATIENT)
Dept: CARDIOLOGY | Facility: CLINIC | Age: 65
End: 2025-07-31
Payer: MEDICARE

## 2025-07-31 PROCEDURE — 93306 TTE W/DOPPLER COMPLETE: CPT

## 2025-08-05 ENCOUNTER — APPOINTMENT (OUTPATIENT)
Dept: CT IMAGING | Facility: CLINIC | Age: 65
End: 2025-08-05
Payer: MEDICARE

## 2025-08-05 ENCOUNTER — OUTPATIENT (OUTPATIENT)
Dept: OUTPATIENT SERVICES | Facility: HOSPITAL | Age: 65
LOS: 1 days | End: 2025-08-05
Payer: MEDICARE

## 2025-08-05 ENCOUNTER — NON-APPOINTMENT (OUTPATIENT)
Age: 65
End: 2025-08-05

## 2025-08-05 DIAGNOSIS — Z98.890 OTHER SPECIFIED POSTPROCEDURAL STATES: Chronic | ICD-10-CM

## 2025-08-05 DIAGNOSIS — R07.89 OTHER CHEST PAIN: ICD-10-CM

## 2025-08-05 DIAGNOSIS — Z96.651 PRESENCE OF RIGHT ARTIFICIAL KNEE JOINT: Chronic | ICD-10-CM

## 2025-08-05 PROCEDURE — 75574 CT ANGIO HRT W/3D IMAGE: CPT

## 2025-08-05 PROCEDURE — 75574 CT ANGIO HRT W/3D IMAGE: CPT | Mod: 26

## 2025-08-06 ENCOUNTER — APPOINTMENT (OUTPATIENT)
Dept: BARIATRICS/WEIGHT MGMT | Facility: CLINIC | Age: 65
End: 2025-08-06
Payer: MEDICARE

## 2025-08-06 ENCOUNTER — OUTPATIENT (OUTPATIENT)
Dept: OUTPATIENT SERVICES | Facility: HOSPITAL | Age: 65
LOS: 1 days | End: 2025-08-06

## 2025-08-06 DIAGNOSIS — Z96.651 PRESENCE OF RIGHT ARTIFICIAL KNEE JOINT: Chronic | ICD-10-CM

## 2025-08-06 DIAGNOSIS — E11.9 TYPE 2 DIABETES MELLITUS W/OUT COMPLICATIONS: ICD-10-CM

## 2025-08-06 DIAGNOSIS — E66.9 OBESITY, UNSPECIFIED: ICD-10-CM

## 2025-08-06 DIAGNOSIS — Z98.890 OTHER SPECIFIED POSTPROCEDURAL STATES: Chronic | ICD-10-CM

## 2025-08-06 DIAGNOSIS — I10 ESSENTIAL (PRIMARY) HYPERTENSION: ICD-10-CM

## 2025-08-06 PROCEDURE — G2211 COMPLEX E/M VISIT ADD ON: CPT | Mod: 95

## 2025-08-06 PROCEDURE — 99213 OFFICE O/P EST LOW 20 MIN: CPT | Mod: 95

## 2025-08-18 ENCOUNTER — APPOINTMENT (OUTPATIENT)
Dept: ORTHOPEDIC SURGERY | Facility: CLINIC | Age: 65
End: 2025-08-18

## 2025-08-18 DIAGNOSIS — M25.579 PAIN IN UNSPECIFIED ANKLE AND JOINTS OF UNSPECIFIED FOOT: ICD-10-CM

## 2025-08-18 DIAGNOSIS — M67.88 OTHER SPECIFIED DISORDERS OF SYNOVIUM AND TENDON, OTHER SITE: ICD-10-CM

## 2025-08-18 PROCEDURE — 99203 OFFICE O/P NEW LOW 30 MIN: CPT

## 2025-08-18 PROCEDURE — 73630 X-RAY EXAM OF FOOT: CPT | Mod: LT

## 2025-08-18 PROCEDURE — 73610 X-RAY EXAM OF ANKLE: CPT | Mod: LT

## 2025-09-01 PROBLEM — M21.6X2 ACQUIRED LEFT HINDFOOT VARUS: Status: ACTIVE | Noted: 2025-09-01

## 2025-09-01 PROBLEM — M62.462 GASTROCNEMIUS EQUINUS OF LEFT LOWER EXTREMITY: Status: ACTIVE | Noted: 2025-09-01

## 2025-09-05 ENCOUNTER — OUTPATIENT (OUTPATIENT)
Dept: OUTPATIENT SERVICES | Facility: HOSPITAL | Age: 65
LOS: 1 days | End: 2025-09-05

## 2025-09-05 ENCOUNTER — APPOINTMENT (OUTPATIENT)
Dept: BARIATRICS/WEIGHT MGMT | Facility: CLINIC | Age: 65
End: 2025-09-05
Payer: MEDICARE

## 2025-09-05 DIAGNOSIS — I10 ESSENTIAL (PRIMARY) HYPERTENSION: ICD-10-CM

## 2025-09-05 DIAGNOSIS — E11.9 TYPE 2 DIABETES MELLITUS W/OUT COMPLICATIONS: ICD-10-CM

## 2025-09-05 DIAGNOSIS — Z98.890 OTHER SPECIFIED POSTPROCEDURAL STATES: Chronic | ICD-10-CM

## 2025-09-05 DIAGNOSIS — E66.9 OBESITY, UNSPECIFIED: ICD-10-CM

## 2025-09-05 DIAGNOSIS — Z96.651 PRESENCE OF RIGHT ARTIFICIAL KNEE JOINT: Chronic | ICD-10-CM

## 2025-09-05 PROCEDURE — 99213 OFFICE O/P EST LOW 20 MIN: CPT | Mod: 95

## 2025-09-05 PROCEDURE — G2211 COMPLEX E/M VISIT ADD ON: CPT | Mod: 95

## 2025-09-12 ENCOUNTER — RX RENEWAL (OUTPATIENT)
Age: 65
End: 2025-09-12

## 2025-09-16 ENCOUNTER — APPOINTMENT (OUTPATIENT)
Dept: GASTROENTEROLOGY | Facility: CLINIC | Age: 65
End: 2025-09-16
Payer: MEDICARE

## 2025-09-16 VITALS
HEIGHT: 69 IN | RESPIRATION RATE: 16 BRPM | DIASTOLIC BLOOD PRESSURE: 64 MMHG | WEIGHT: 233 LBS | SYSTOLIC BLOOD PRESSURE: 116 MMHG | OXYGEN SATURATION: 97 % | HEART RATE: 70 BPM | BODY MASS INDEX: 34.51 KG/M2

## 2025-09-16 DIAGNOSIS — Z86.0100 PERSONAL HISTORY OF COLON POLYPS, UNSPECIFIED: ICD-10-CM

## 2025-09-16 DIAGNOSIS — K22.710 BARRETT'S ESOPHAGUS WITH LOW GRADE DYSPLASIA: ICD-10-CM

## 2025-09-16 DIAGNOSIS — E66.9 OBESITY, UNSPECIFIED: ICD-10-CM

## 2025-09-16 DIAGNOSIS — R14.0 ABDOMINAL DISTENSION (GASEOUS): ICD-10-CM

## 2025-09-16 PROCEDURE — 99214 OFFICE O/P EST MOD 30 MIN: CPT

## 2025-09-17 DIAGNOSIS — E66.9 OBESITY, UNSPECIFIED: ICD-10-CM

## 2025-09-17 DIAGNOSIS — E11.9 TYPE 2 DIABETES MELLITUS WITHOUT COMPLICATIONS: ICD-10-CM

## 2025-09-17 PROBLEM — Z86.0100 HISTORY OF COLON POLYPS: Status: ACTIVE | Noted: 2018-02-27

## (undated) DEVICE — CONTAINER FORMALIN 80ML YELLOW

## (undated) DEVICE — DRSG BANDAID 0.75X3"

## (undated) DEVICE — BIOPSY FORCEP RADIAL JAW 4 STANDARD WITH NEEDLE

## (undated) DEVICE — CLAMP BX HOT RAD JAW 3

## (undated) DEVICE — TUBING SUCTION CONN 6FT STERILE

## (undated) DEVICE — BITE BLOCK ADULT 20 X 27MM (GREEN)

## (undated) DEVICE — SALIVA EJECTOR (BLUE)

## (undated) DEVICE — SYR ALLIANCE II INFLATION 60ML

## (undated) DEVICE — ELCTR ECG CONDUCTIVE ADHESIVE

## (undated) DEVICE — CATH IV SAFE BC 22G X 1" (BLUE)

## (undated) DEVICE — LINE BREATHE SAMPLNG

## (undated) DEVICE — GOWN LG

## (undated) DEVICE — TUBING MEDI-VAC W MAXIGRIP CONNECTORS 1/4"X6'

## (undated) DEVICE — LUBRICATING JELLY HR ONE SHOT 3G

## (undated) DEVICE — UNDERPAD LINEN SAVER 17 X 24"

## (undated) DEVICE — PACK IV START WITH CHG

## (undated) DEVICE — DRSG 2X2

## (undated) DEVICE — SYR LUER LOK 50CC

## (undated) DEVICE — FORCEP RADIAL JAW 4 240CM DISP

## (undated) DEVICE — CONTAINER FORMALIN 10% 20ML

## (undated) DEVICE — DRSG CURITY GAUZE SPONGE 4 X 4" 12-PLY NON-STERILE

## (undated) DEVICE — SUCTION YANKAUER NO CONTROL VENT

## (undated) DEVICE — BRUSH COLONOSCOPY CYTOLOGY

## (undated) DEVICE — TUBING IV SET GRAVITY 3Y 100" MACRO

## (undated) DEVICE — TUBING SUCTION 20FT

## (undated) DEVICE — DENTURE CUP PINK

## (undated) DEVICE — BIOPSY FORCEP COLD DISP

## (undated) DEVICE — BASIN EMESIS 10IN GRADUATED MAUVE

## (undated) DEVICE — FORCEP RADIAL JAW 4 W NDL 2.4MM 2.8MM 240CM ORANGE DISP

## (undated) DEVICE — FORCEP RADIAL JAW 4 JUMBO 2.8MM 3.2MM 240CM ORANGE DISP

## (undated) DEVICE — CATH IV SAFE BC 20G X 1.16" (PINK)

## (undated) DEVICE — FOLEY HOLDER STATLOCK 2 WAY ADULT

## (undated) DEVICE — SOL INJ NS 0.9% 500ML 2 PORT

## (undated) DEVICE — SENSOR O2 FINGER ADULT

## (undated) DEVICE — BALLOON US ENDO

## (undated) DEVICE — ELCTR GROUNDING PAD ADULT COVIDIEN

## (undated) DEVICE — SENSOR O2 FINGER ADULT 24/CA

## (undated) DEVICE — POLY TRAP ETRAP

## (undated) DEVICE — IRRIGATOR BIO SHIELD